# Patient Record
Sex: MALE | Race: WHITE | URBAN - METROPOLITAN AREA
[De-identification: names, ages, dates, MRNs, and addresses within clinical notes are randomized per-mention and may not be internally consistent; named-entity substitution may affect disease eponyms.]

---

## 2017-01-04 ENCOUNTER — OUTPATIENT (OUTPATIENT)
Dept: OUTPATIENT SERVICES | Facility: HOSPITAL | Age: 72
LOS: 1 days | End: 2017-01-04
Payer: MEDICARE

## 2017-01-04 DIAGNOSIS — R55 SYNCOPE AND COLLAPSE: ICD-10-CM

## 2017-01-04 PROCEDURE — 70544 MR ANGIOGRAPHY HEAD W/O DYE: CPT | Mod: 26,59

## 2017-01-04 PROCEDURE — 70551 MRI BRAIN STEM W/O DYE: CPT | Mod: 26

## 2017-01-04 PROCEDURE — 70551 MRI BRAIN STEM W/O DYE: CPT

## 2017-01-04 PROCEDURE — 70544 MR ANGIOGRAPHY HEAD W/O DYE: CPT

## 2017-03-22 ENCOUNTER — EMERGENCY (EMERGENCY)
Facility: HOSPITAL | Age: 72
LOS: 1 days | Discharge: HOME | End: 2017-03-22
Attending: EMERGENCY MEDICINE | Admitting: EMERGENCY MEDICINE
Payer: MEDICARE

## 2017-03-22 VITALS
HEART RATE: 77 BPM | TEMPERATURE: 98 F | RESPIRATION RATE: 18 BRPM | DIASTOLIC BLOOD PRESSURE: 65 MMHG | OXYGEN SATURATION: 100 % | SYSTOLIC BLOOD PRESSURE: 176 MMHG

## 2017-03-22 VITALS — DIASTOLIC BLOOD PRESSURE: 78 MMHG | HEART RATE: 78 BPM | SYSTOLIC BLOOD PRESSURE: 140 MMHG
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
## 2017-03-22 DIAGNOSIS — Z87.891 PERSONAL HISTORY OF NICOTINE DEPENDENCE: ICD-10-CM

## 2017-03-22 DIAGNOSIS — I25.10 ATHEROSCLEROTIC HEART DISEASE OF NATIVE CORONARY ARTERY WITHOUT ANGINA PECTORIS: ICD-10-CM
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
## 2017-03-22 DIAGNOSIS — Z98.890 OTHER SPECIFIED POSTPROCEDURAL STATES: Chronic | ICD-10-CM

## 2017-03-22 DIAGNOSIS — R53.1 WEAKNESS: ICD-10-CM

## 2017-03-22 DIAGNOSIS — I10 ESSENTIAL (PRIMARY) HYPERTENSION: ICD-10-CM

## 2017-03-22 DIAGNOSIS — I63.9 CEREBRAL INFARCTION, UNSPECIFIED: ICD-10-CM

## 2017-03-22 DIAGNOSIS — E78.5 HYPERLIPIDEMIA, UNSPECIFIED: ICD-10-CM

## 2017-03-22 DIAGNOSIS — Z90.49 ACQUIRED ABSENCE OF OTHER SPECIFIED PARTS OF DIGESTIVE TRACT: ICD-10-CM

## 2017-03-22 DIAGNOSIS — Z90.49 ACQUIRED ABSENCE OF OTHER SPECIFIED PARTS OF DIGESTIVE TRACT: Chronic | ICD-10-CM
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
## 2017-03-22 PROCEDURE — 70450 CT HEAD/BRAIN W/O DYE: CPT | Mod: 26

## 2017-03-22 PROCEDURE — 99284 EMERGENCY DEPT VISIT MOD MDM: CPT | Mod: GC

## 2017-03-22 PROCEDURE — 70450 CT HEAD/BRAIN W/O DYE: CPT

## 2017-03-22 PROCEDURE — 99284 EMERGENCY DEPT VISIT MOD MDM: CPT | Mod: 25

## 2017-03-22 RX ORDER — ACETAMINOPHEN 500 MG
975 TABLET ORAL ONCE
Qty: 0 | Refills: 0 | Status: COMPLETED | OUTPATIENT
Start: 2017-03-22 | End: 2017-03-22

## 2017-03-22 RX ADMIN — Medication 975 MILLIGRAM(S): at 17:00

## 2017-03-22 NOTE — ED PROVIDER NOTE - OBJECTIVE STATEMENT
68 year old in assisted living since cva with left sided weakness, with syncope this morning and head strike against wall (laura left on the wall as per EMS). event occurred at 6am, patient was escorted promptly to bed and then staff were concerned on routine PT eval this afternoon. On aspirin and plavix for CAD. patient states he twisted his ankle and fell (mechanical) and patient fell into the wall.     PMD: Lina Russell and Dr. Krueger 68 year old in assisted living since cva in November 2016 with left sided weakness, with syncope this morning and head strike against wall (laura left on the wall as per EMS). event occurred at 6am, patient was escorted promptly to bed and then staff were concerned on routine PT eval this afternoon. On aspirin and plavix for CAD. patient states he twisted his ankle and fell (mechanical) and patient fell into the wall. impact to left side of head. no focal complaints or pain currently    PMD: Lina Russell and Dr. Krueger 68 year old in assisted living since cva and seizure, on keppra in November 2016 with left sided weakness, with syncope this morning and head strike against wall (laura left on the wall as per EMS). event occurred at 6am, patient was escorted promptly to bed and then staff were concerned on routine PT eval this afternoon. On aspirin and plavix for CAD. patient states he twisted his ankle and fell (mechanical) and patient fell into the wall. impact to left side of head. no focal complaints or pain currently    PMD: Lina Russell and Dr. Krueger 68 year old in assisted living since cva and seizure, on keppra in November 2016 with left sided weakness, with fall this morning and head strike against wall (laura left on the wall as per EMS). event occurred at 6am, patient was escorted promptly to bed and then staff were concerned on routine PT eval this afternoon. On aspirin and plavix for CAD. patient states he twisted his ankle and fell (mechanical) and patient fell into the wall. impact to left side of head. no focal complaints or pain currently. aaox4 with reported downtime of 5 minutes.     PMD: Lina Russell and Dr. Krueger

## 2017-03-22 NOTE — ED PROVIDER NOTE - PHYSICAL EXAMINATION
Len: A & O x 3, NAD, HEENT with no pain on palpation of head or bruising and with mild lower left facial weakness (chronic); lungs CTAB, heart with reg rhythm without murmur; abdomen soft NTND; extremities with no edema; skin with no rashes, neuro exam with left motor weakness on left hand (3/5 ), arm and hip flexion (4/5) with no sensory deficits. no chest wall pain , back pain, arm or leg pain on palpation.

## 2017-03-22 NOTE — ED PROVIDER NOTE - ATTENDING CONTRIBUTION TO CARE
70 yo male with PMH of HTN, HL, CVA with left sided hemiparesis, CAD with stents, on plavix and asa presents s/p mechanical fall at 6 am with + HS to wall, no LOC. Patient denies any ha, n/v/visual changes, parasthesias, new weakness, cp, sob, abdominal pain, pleuritic pain. Sent here by SNF for eval. On exam, AVSS, EOMI, PERRLA, no trauma to head,5/5 strength to RUE, no pain to pelvis, stable, no pain to chest wall, cta bl, no c,t, or ls spine ttp. soft, nt nd abdomen, no r/g/r.

## 2017-03-22 NOTE — ED PROVIDER NOTE - CARE PLAN
Principal Discharge DX:	Fall  Instructions for follow-up, activity and diet:	Follow up with your primary care doctor within 48-72 hours.   You must return for new, worsening or concerning symptoms; specifically including those listed on the attached sheet.   You may take 1000mg of tylenol every 6 hours for baseline pain control with respect to the warnings on the label

## 2017-03-22 NOTE — ED PROVIDER NOTE - PLAN OF CARE
Follow up with your primary care doctor within 48-72 hours.   You must return for new, worsening or concerning symptoms; specifically including those listed on the attached sheet.   You may take 1000mg of tylenol every 6 hours for baseline pain control with respect to the warnings on the label

## 2017-03-22 NOTE — ED PROVIDER NOTE - PMH
CAD (coronary artery disease)    CVA (cerebral vascular accident)    HLD (hyperlipidemia)    HTN (hypertension) CAD (coronary artery disease)    CVA (cerebral vascular accident)    HLD (hyperlipidemia)    HTN (hypertension)    Seizure syndrome

## 2017-03-22 NOTE — ED ADULT NURSE NOTE - OBJECTIVE STATEMENT
pt was getting out of bed to use restroom this morning at 630am when he lost his balance and fell into a wall, striking head. pt then began having headache, left sided, ems was called. pt arrives aao x 3, no resp distress, skin warm, dry. no obvious injuries or deformities, pt reports no loc. no dizziness, no recent illness, hx CVA in nov 2016, left sided weakness, unable to move left arm independently, left leg weaker than right.

## 2017-05-10 ENCOUNTER — APPOINTMENT (OUTPATIENT)
Dept: VASCULAR SURGERY | Facility: CLINIC | Age: 72
End: 2017-05-10

## 2017-05-11 ENCOUNTER — EMERGENCY (EMERGENCY)
Facility: HOSPITAL | Age: 72
LOS: 1 days | Discharge: HOME | End: 2017-05-11
Attending: EMERGENCY MEDICINE | Admitting: EMERGENCY MEDICINE
Payer: MEDICARE

## 2017-05-11 VITALS
RESPIRATION RATE: 20 BRPM | HEART RATE: 74 BPM | TEMPERATURE: 98 F | SYSTOLIC BLOOD PRESSURE: 143 MMHG | DIASTOLIC BLOOD PRESSURE: 89 MMHG | OXYGEN SATURATION: 99 %

## 2017-05-11 VITALS
OXYGEN SATURATION: 94 % | TEMPERATURE: 98 F | DIASTOLIC BLOOD PRESSURE: 85 MMHG | HEART RATE: 89 BPM | SYSTOLIC BLOOD PRESSURE: 166 MMHG | RESPIRATION RATE: 20 BRPM

## 2017-05-11 DIAGNOSIS — Z86.73 PERSONAL HISTORY OF TRANSIENT ISCHEMIC ATTACK (TIA), AND CEREBRAL INFARCTION WITHOUT RESIDUAL DEFICITS: ICD-10-CM
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
## 2017-05-11 DIAGNOSIS — Z98.890 OTHER SPECIFIED POSTPROCEDURAL STATES: ICD-10-CM
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
## 2017-05-11 DIAGNOSIS — S50.02XA CONTUSION OF LEFT ELBOW, INITIAL ENCOUNTER: ICD-10-CM

## 2017-05-11 DIAGNOSIS — Z90.49 ACQUIRED ABSENCE OF OTHER SPECIFIED PARTS OF DIGESTIVE TRACT: ICD-10-CM

## 2017-05-11 DIAGNOSIS — E78.5 HYPERLIPIDEMIA, UNSPECIFIED: ICD-10-CM

## 2017-05-11 DIAGNOSIS — I10 ESSENTIAL (PRIMARY) HYPERTENSION: ICD-10-CM
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
## 2017-05-11 DIAGNOSIS — Z98.890 OTHER SPECIFIED POSTPROCEDURAL STATES: Chronic | ICD-10-CM

## 2017-05-11 DIAGNOSIS — I25.10 ATHEROSCLEROTIC HEART DISEASE OF NATIVE CORONARY ARTERY WITHOUT ANGINA PECTORIS: ICD-10-CM

## 2017-05-11 DIAGNOSIS — Z90.49 ACQUIRED ABSENCE OF OTHER SPECIFIED PARTS OF DIGESTIVE TRACT: Chronic | ICD-10-CM

## 2017-05-11 DIAGNOSIS — S50.12XA CONTUSION OF LEFT FOREARM, INITIAL ENCOUNTER: ICD-10-CM
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
## 2017-05-11 PROCEDURE — 29105 APPLICATION LONG ARM SPLINT: CPT | Mod: GC

## 2017-05-11 PROCEDURE — 73110 X-RAY EXAM OF WRIST: CPT | Mod: 26,LT

## 2017-05-11 PROCEDURE — 73060 X-RAY EXAM OF HUMERUS: CPT | Mod: 26,LT

## 2017-05-11 PROCEDURE — 93010 ELECTROCARDIOGRAM REPORT: CPT | Mod: 59

## 2017-05-11 PROCEDURE — 73090 X-RAY EXAM OF FOREARM: CPT | Mod: 26,LT

## 2017-05-11 PROCEDURE — 70450 CT HEAD/BRAIN W/O DYE: CPT

## 2017-05-11 PROCEDURE — 96375 TX/PRO/DX INJ NEW DRUG ADDON: CPT | Mod: XU

## 2017-05-11 PROCEDURE — 70450 CT HEAD/BRAIN W/O DYE: CPT | Mod: 26

## 2017-05-11 PROCEDURE — 73080 X-RAY EXAM OF ELBOW: CPT

## 2017-05-11 PROCEDURE — 73030 X-RAY EXAM OF SHOULDER: CPT

## 2017-05-11 PROCEDURE — 99284 EMERGENCY DEPT VISIT MOD MDM: CPT | Mod: 25,GC

## 2017-05-11 PROCEDURE — 93005 ELECTROCARDIOGRAM TRACING: CPT | Mod: XU

## 2017-05-11 PROCEDURE — 73080 X-RAY EXAM OF ELBOW: CPT | Mod: 26,LT

## 2017-05-11 PROCEDURE — 73110 X-RAY EXAM OF WRIST: CPT

## 2017-05-11 PROCEDURE — 73030 X-RAY EXAM OF SHOULDER: CPT | Mod: 26,LT

## 2017-05-11 PROCEDURE — 29105 APPLICATION LONG ARM SPLINT: CPT | Mod: LT

## 2017-05-11 PROCEDURE — 73060 X-RAY EXAM OF HUMERUS: CPT

## 2017-05-11 PROCEDURE — 73090 X-RAY EXAM OF FOREARM: CPT

## 2017-05-11 PROCEDURE — 99284 EMERGENCY DEPT VISIT MOD MDM: CPT | Mod: 25

## 2017-05-11 PROCEDURE — 96374 THER/PROPH/DIAG INJ IV PUSH: CPT | Mod: XU

## 2017-05-11 RX ORDER — OXYCODONE HYDROCHLORIDE 5 MG/1
1 TABLET ORAL
Qty: 6 | Refills: 0 | OUTPATIENT
Start: 2017-05-11 | End: 2017-05-14

## 2017-05-11 RX ORDER — ACETAMINOPHEN 500 MG
1000 TABLET ORAL ONCE
Qty: 0 | Refills: 0 | Status: COMPLETED | OUTPATIENT
Start: 2017-05-11 | End: 2017-05-11

## 2017-05-11 RX ORDER — MORPHINE SULFATE 50 MG/1
4 CAPSULE, EXTENDED RELEASE ORAL ONCE
Qty: 0 | Refills: 0 | Status: DISCONTINUED | OUTPATIENT
Start: 2017-05-11 | End: 2017-05-11

## 2017-05-11 RX ADMIN — Medication 400 MILLIGRAM(S): at 13:20

## 2017-05-11 RX ADMIN — Medication 1000 MILLIGRAM(S): at 14:52

## 2017-05-11 RX ADMIN — MORPHINE SULFATE 4 MILLIGRAM(S): 50 CAPSULE, EXTENDED RELEASE ORAL at 15:00

## 2017-05-11 NOTE — ED PROVIDER NOTE - PLAN OF CARE
1) You were here for arm pain.    2) Take oxycodone 2 times daily as needed for severe pain.     3) Follow up with the orthopedist at [phone] for further evaluation and to answer any questions you have.    4) Return to the emergency department if you experience worsening symptoms, pain, fever, chills, nausea, vomiting or other concerning symptoms.

## 2017-05-11 NOTE — ED PROVIDER NOTE - PROGRESS NOTE DETAILS
pain limiting X-rays, will give pain control, reattemtp imaging. Signed out to nicholas. Attending MD Pierce: XR elbow prelim read without obvious fracture, significant swelling and pain to elbow, will immobilize elbow and refer to orthopedics to rule out occult elbow fx

## 2017-05-11 NOTE — ED PROCEDURE NOTE - CPROC ED POST PROC CARE GUIDE1
Instructed patient/caregiver regarding signs and symptoms of infection./Verbal/written post procedure instructions were given to patient/caregiver./Keep the cast/splint/dressing clean and dry./Elevate the injured extremity as instructed./Instructed patient/caregiver to follow-up with primary care physician.

## 2017-05-11 NOTE — ED PROVIDER NOTE - OBJECTIVE STATEMENT
AOx4 78M with pmh CVA (11/2017) with residual L sided weakness, htn, hl, BL lower extremity DVTs p/w pain in L arm s/pw fall yesterday.  Yesterday, had trip and fall, striking L head on elevator door, on ground for 5-10 minutes.  Had xrays at [facility] with negative xrays.  C/o pain in L arm, worst around elbow with swelling, ecchymosis, pain with ROM of L elbow, shoulder, wrist.  Has decreased LUE sensation but says is chronic.  Denies fever, chills, cp, sob, recent illness, LOC, n/v, diaphoresis.    primary medical doctor:

## 2017-05-11 NOTE — ED ADULT NURSE NOTE - OBJECTIVE STATEMENT
Patient  is  alert  and oriented x3.  Color  is  good and  skin warm  to  touch.  He  tripped  and  fell  at  the assisted  living.  He is c/o  lt  upper  extremity pain.  Swelling  and  ecchymosis noted to  lt  arm.  Area  is  kept  elevated.

## 2017-05-11 NOTE — ED PROVIDER NOTE - MUSCULOSKELETAL, MLM
Spine appears normal, range of motion is not limited, tenderness to palpation, decreased range of motion of L elbow/forearm.  Ecchymosis and swelling of L elbow and forearm.

## 2017-05-11 NOTE — ED PROVIDER NOTE - ATTENDING CONTRIBUTION TO CARE
ATTENDING MD:  Arie METZ, personally have seen and examined this patient.  I have discussed all aspects of care with the resident physician. Resident note reviewed and agree on plan of care and except where noted.  See HPI, PE, and MDM for details.     EXAM: NCAT, FRANCESCA, EOMI, neck nontender and painless ROM. Lungs CTAB, no chest wall tenderness. Heart RRR. LUE with TTP raciel-elbow with large elbow effusion and pain on supination and TTp over radial head. unable to fully flex or extend. mild pain ranging Lshoulder btu full AROM. Neurovascularly intact distal to elbow. 2+ raadila pulses able to thumbs, up, ok sign and spread fingers. sensation intact to light touch throughout LUE. Otherwise nonfocal neuro exam no clear other injury.    MDM: fall anticoagulated worsenign arm pain. suspect occult arm fx. will CT head, image arm. probale splitn with orhto f/u.

## 2017-05-11 NOTE — ED PROVIDER NOTE - CARE PLAN
Principal Discharge DX:	Pain of left upper extremity  Instructions for follow-up, activity and diet:	1) You were here for arm pain.    2) Take oxycodone 2 times daily as needed for severe pain.     3) Follow up with the orthopedist at [phone] for further evaluation and to answer any questions you have.    4) Return to the emergency department if you experience worsening symptoms, pain, fever, chills, nausea, vomiting or other concerning symptoms. Principal Discharge DX:	Pain of left upper extremity  Instructions for follow-up, activity and diet:	1) You were here for arm pain.    2) Take oxycodone 2 times daily as needed for severe pain.     3) Follow up with the orthopedist at [phone] for further evaluation and to answer any questions you have.    4) Return to the emergency department if you experience worsening symptoms, pain, fever, chills, nausea, vomiting or other concerning symptoms. Principal Discharge DX:	Pain of left upper extremity  Instructions for follow-up, activity and diet:	1) You were here for arm pain.    2) Take oxycodone 2 times daily as needed for severe pain.     3) Follow up with the orthopedist at [phone] for further evaluation and to answer any questions you have.    4) Return to the emergency department if you experience worsening symptoms, pain, fever, chills, nausea, vomiting or other concerning symptoms.

## 2017-05-11 NOTE — ED ADULT NURSE NOTE - PMH
CAD (coronary artery disease)    CVA (cerebral vascular accident)    HLD (hyperlipidemia)    HTN (hypertension)    Seizure syndrome

## 2018-01-22 ENCOUNTER — INPATIENT (INPATIENT)
Facility: HOSPITAL | Age: 73
LOS: 7 days | Discharge: HOME | DRG: 394 | End: 2018-01-30
Attending: INTERNAL MEDICINE | Admitting: HOSPITALIST
Payer: MEDICARE

## 2018-01-22 VITALS
OXYGEN SATURATION: 96 % | WEIGHT: 190.04 LBS | HEART RATE: 95 BPM | HEIGHT: 69 IN | TEMPERATURE: 97 F | RESPIRATION RATE: 15 BRPM | SYSTOLIC BLOOD PRESSURE: 132 MMHG | DIASTOLIC BLOOD PRESSURE: 76 MMHG

## 2018-01-22 DIAGNOSIS — J18.9 PNEUMONIA, UNSPECIFIED ORGANISM: ICD-10-CM

## 2018-01-22 DIAGNOSIS — D72.829 ELEVATED WHITE BLOOD CELL COUNT, UNSPECIFIED: ICD-10-CM

## 2018-01-22 DIAGNOSIS — K62.5 HEMORRHAGE OF ANUS AND RECTUM: ICD-10-CM

## 2018-01-22 DIAGNOSIS — G40.909 EPILEPSY, UNSPECIFIED, NOT INTRACTABLE, WITHOUT STATUS EPILEPTICUS: ICD-10-CM

## 2018-01-22 DIAGNOSIS — E78.5 HYPERLIPIDEMIA, UNSPECIFIED: ICD-10-CM

## 2018-01-22 DIAGNOSIS — I25.10 ATHEROSCLEROTIC HEART DISEASE OF NATIVE CORONARY ARTERY WITHOUT ANGINA PECTORIS: ICD-10-CM

## 2018-01-22 DIAGNOSIS — Z98.890 OTHER SPECIFIED POSTPROCEDURAL STATES: Chronic | ICD-10-CM

## 2018-01-22 DIAGNOSIS — I10 ESSENTIAL (PRIMARY) HYPERTENSION: ICD-10-CM

## 2018-01-22 DIAGNOSIS — Z29.9 ENCOUNTER FOR PROPHYLACTIC MEASURES, UNSPECIFIED: ICD-10-CM

## 2018-01-22 DIAGNOSIS — I63.9 CEREBRAL INFARCTION, UNSPECIFIED: ICD-10-CM

## 2018-01-22 DIAGNOSIS — R79.89 OTHER SPECIFIED ABNORMAL FINDINGS OF BLOOD CHEMISTRY: ICD-10-CM

## 2018-01-22 DIAGNOSIS — C95.90 LEUKEMIA, UNSPECIFIED NOT HAVING ACHIEVED REMISSION: ICD-10-CM

## 2018-01-22 DIAGNOSIS — C92.10 CHRONIC MYELOID LEUKEMIA, BCR/ABL-POSITIVE, NOT HAVING ACHIEVED REMISSION: ICD-10-CM

## 2018-01-22 DIAGNOSIS — Z95.5 PRESENCE OF CORONARY ANGIOPLASTY IMPLANT AND GRAFT: Chronic | ICD-10-CM

## 2018-01-22 DIAGNOSIS — Z90.49 ACQUIRED ABSENCE OF OTHER SPECIFIED PARTS OF DIGESTIVE TRACT: Chronic | ICD-10-CM

## 2018-01-22 DIAGNOSIS — K92.2 GASTROINTESTINAL HEMORRHAGE, UNSPECIFIED: ICD-10-CM

## 2018-01-22 LAB
ACANTHOCYTES BLD QL SMEAR: SLIGHT — SIGNIFICANT CHANGE UP
ALBUMIN SERPL ELPH-MCNC: 3.7 G/DL — SIGNIFICANT CHANGE UP (ref 3.3–5)
ALP SERPL-CCNC: 100 U/L — SIGNIFICANT CHANGE UP (ref 40–120)
ALT FLD-CCNC: 27 U/L — SIGNIFICANT CHANGE UP (ref 12–78)
ANION GAP SERPL CALC-SCNC: 7 MMOL/L — SIGNIFICANT CHANGE UP (ref 5–17)
ANISOCYTOSIS BLD QL: SLIGHT — SIGNIFICANT CHANGE UP
APPEARANCE UR: CLEAR — SIGNIFICANT CHANGE UP
APTT BLD: 29.7 SEC — SIGNIFICANT CHANGE UP (ref 27.5–37.4)
AST SERPL-CCNC: 25 U/L — SIGNIFICANT CHANGE UP (ref 15–37)
BACTERIA # UR AUTO: NEGATIVE — SIGNIFICANT CHANGE UP
BASOPHILS NFR BLD AUTO: 1 % — SIGNIFICANT CHANGE UP (ref 0–2)
BILIRUB SERPL-MCNC: 0.5 MG/DL — SIGNIFICANT CHANGE UP (ref 0.2–1.2)
BILIRUB UR-MCNC: NEGATIVE — SIGNIFICANT CHANGE UP
BLD GP AB SCN SERPL QL: SIGNIFICANT CHANGE UP
BUN SERPL-MCNC: 17 MG/DL — SIGNIFICANT CHANGE UP (ref 7–23)
CALCIUM SERPL-MCNC: 9.1 MG/DL — SIGNIFICANT CHANGE UP (ref 8.5–10.1)
CHLORIDE SERPL-SCNC: 105 MMOL/L — SIGNIFICANT CHANGE UP (ref 96–108)
CO2 SERPL-SCNC: 28 MMOL/L — SIGNIFICANT CHANGE UP (ref 22–31)
COLOR SPEC: YELLOW — SIGNIFICANT CHANGE UP
CREAT SERPL-MCNC: 0.79 MG/DL — SIGNIFICANT CHANGE UP (ref 0.5–1.3)
DACRYOCYTES BLD QL SMEAR: SLIGHT — SIGNIFICANT CHANGE UP
DIFF PNL FLD: ABNORMAL
ELLIPTOCYTES BLD QL SMEAR: SLIGHT — SIGNIFICANT CHANGE UP
EPI CELLS # UR: NEGATIVE — SIGNIFICANT CHANGE UP
GLUCOSE SERPL-MCNC: 82 MG/DL — SIGNIFICANT CHANGE UP (ref 70–99)
GLUCOSE UR QL: NEGATIVE — SIGNIFICANT CHANGE UP
HCT VFR BLD CALC: 45.7 % — SIGNIFICANT CHANGE UP (ref 39–50)
HGB BLD-MCNC: 14.9 G/DL — SIGNIFICANT CHANGE UP (ref 13–17)
HYALINE CASTS # UR AUTO: ABNORMAL /LPF
INR BLD: 1.15 RATIO — SIGNIFICANT CHANGE UP (ref 0.88–1.16)
KETONES UR-MCNC: NEGATIVE — SIGNIFICANT CHANGE UP
LACTATE SERPL-SCNC: 1 MMOL/L — SIGNIFICANT CHANGE UP (ref 0.7–2)
LACTATE SERPL-SCNC: 2.3 MMOL/L — HIGH (ref 0.7–2)
LEUKOCYTE ESTERASE UR-ACNC: NEGATIVE — SIGNIFICANT CHANGE UP
LG PLATELETS BLD QL AUTO: SLIGHT — SIGNIFICANT CHANGE UP
LYMPHOCYTES # BLD AUTO: 86 % — HIGH (ref 13–44)
MACROCYTES BLD QL: SLIGHT — SIGNIFICANT CHANGE UP
MCHC RBC-ENTMCNC: 29.1 PG — SIGNIFICANT CHANGE UP (ref 27–34)
MCHC RBC-ENTMCNC: 32.6 GM/DL — SIGNIFICANT CHANGE UP (ref 32–36)
MCV RBC AUTO: 89.1 FL — SIGNIFICANT CHANGE UP (ref 80–100)
MICROCYTES BLD QL: SLIGHT — SIGNIFICANT CHANGE UP
MONOCYTES NFR BLD AUTO: 2 % — SIGNIFICANT CHANGE UP (ref 1–9)
NEUTROPHILS NFR BLD AUTO: 11 % — LOW (ref 43–77)
NITRITE UR-MCNC: NEGATIVE — SIGNIFICANT CHANGE UP
OB PNL STL: POSITIVE
OVALOCYTES BLD QL SMEAR: SLIGHT — SIGNIFICANT CHANGE UP
PH UR: 6 — SIGNIFICANT CHANGE UP (ref 5–8)
PLAT MORPH BLD: NORMAL — SIGNIFICANT CHANGE UP
PLATELET # BLD AUTO: 140 K/UL — LOW (ref 150–400)
POIKILOCYTOSIS BLD QL AUTO: SLIGHT — SIGNIFICANT CHANGE UP
POTASSIUM SERPL-MCNC: 4.3 MMOL/L — SIGNIFICANT CHANGE UP (ref 3.5–5.3)
POTASSIUM SERPL-SCNC: 4.3 MMOL/L — SIGNIFICANT CHANGE UP (ref 3.5–5.3)
PROT SERPL-MCNC: 7 G/DL — SIGNIFICANT CHANGE UP (ref 6–8.3)
PROT UR-MCNC: NEGATIVE — SIGNIFICANT CHANGE UP
PROTHROM AB SERPL-ACNC: 12.6 SEC — SIGNIFICANT CHANGE UP (ref 9.8–12.7)
RBC # BLD: 5.13 M/UL — SIGNIFICANT CHANGE UP (ref 4.2–5.8)
RBC # FLD: 12.8 % — SIGNIFICANT CHANGE UP (ref 10.3–14.5)
RBC BLD AUTO: ABNORMAL
RBC CASTS # UR COMP ASSIST: SIGNIFICANT CHANGE UP /HPF (ref 0–4)
SODIUM SERPL-SCNC: 140 MMOL/L — SIGNIFICANT CHANGE UP (ref 135–145)
SP GR SPEC: 1.01 — SIGNIFICANT CHANGE UP (ref 1.01–1.02)
UROBILINOGEN FLD QL: NEGATIVE — SIGNIFICANT CHANGE UP
WBC # BLD: 24.4 K/UL — HIGH (ref 3.8–10.5)
WBC # FLD AUTO: 24.4 K/UL — HIGH (ref 3.8–10.5)
WBC UR QL: NEGATIVE — SIGNIFICANT CHANGE UP

## 2018-01-22 PROCEDURE — 99285 EMERGENCY DEPT VISIT HI MDM: CPT

## 2018-01-22 PROCEDURE — 93010 ELECTROCARDIOGRAM REPORT: CPT

## 2018-01-22 PROCEDURE — 99223 1ST HOSP IP/OBS HIGH 75: CPT | Mod: AI,GC

## 2018-01-22 PROCEDURE — 74177 CT ABD & PELVIS W/CONTRAST: CPT | Mod: 26

## 2018-01-22 RX ORDER — ATORVASTATIN CALCIUM 80 MG/1
40 TABLET, FILM COATED ORAL AT BEDTIME
Qty: 0 | Refills: 0 | Status: DISCONTINUED | OUTPATIENT
Start: 2018-01-22 | End: 2018-01-30

## 2018-01-22 RX ORDER — METOPROLOL TARTRATE 50 MG
25 TABLET ORAL
Qty: 0 | Refills: 0 | Status: DISCONTINUED | OUTPATIENT
Start: 2018-01-22 | End: 2018-01-27

## 2018-01-22 RX ORDER — VANCOMYCIN HCL 1 G
VIAL (EA) INTRAVENOUS
Qty: 0 | Refills: 0 | Status: DISCONTINUED | OUTPATIENT
Start: 2018-01-22 | End: 2018-01-24

## 2018-01-22 RX ORDER — IOHEXOL 300 MG/ML
30 INJECTION, SOLUTION INTRAVENOUS ONCE
Qty: 0 | Refills: 0 | Status: COMPLETED | OUTPATIENT
Start: 2018-01-22 | End: 2018-01-22

## 2018-01-22 RX ORDER — VANCOMYCIN HCL 1 G
1000 VIAL (EA) INTRAVENOUS ONCE
Qty: 0 | Refills: 0 | Status: COMPLETED | OUTPATIENT
Start: 2018-01-22 | End: 2018-01-22

## 2018-01-22 RX ORDER — SODIUM CHLORIDE 9 MG/ML
1000 INJECTION INTRAMUSCULAR; INTRAVENOUS; SUBCUTANEOUS
Qty: 0 | Refills: 0 | Status: DISCONTINUED | OUTPATIENT
Start: 2018-01-22 | End: 2018-01-25

## 2018-01-22 RX ORDER — PANTOPRAZOLE SODIUM 20 MG/1
40 TABLET, DELAYED RELEASE ORAL DAILY
Qty: 0 | Refills: 0 | Status: DISCONTINUED | OUTPATIENT
Start: 2018-01-22 | End: 2018-01-30

## 2018-01-22 RX ORDER — PIPERACILLIN AND TAZOBACTAM 4; .5 G/20ML; G/20ML
3.38 INJECTION, POWDER, LYOPHILIZED, FOR SOLUTION INTRAVENOUS ONCE
Qty: 0 | Refills: 0 | Status: COMPLETED | OUTPATIENT
Start: 2018-01-22 | End: 2018-01-22

## 2018-01-22 RX ORDER — CHOLECALCIFEROL (VITAMIN D3) 125 MCG
1000 CAPSULE ORAL DAILY
Qty: 0 | Refills: 0 | Status: DISCONTINUED | OUTPATIENT
Start: 2018-01-22 | End: 2018-01-30

## 2018-01-22 RX ORDER — GABAPENTIN 400 MG/1
100 CAPSULE ORAL
Qty: 0 | Refills: 0 | Status: DISCONTINUED | OUTPATIENT
Start: 2018-01-22 | End: 2018-01-30

## 2018-01-22 RX ORDER — MEN-PHOR .5; .5 G/G; G/G
1 LOTION TOPICAL THREE TIMES A DAY
Qty: 0 | Refills: 0 | Status: DISCONTINUED | OUTPATIENT
Start: 2018-01-22 | End: 2018-01-22

## 2018-01-22 RX ORDER — PIPERACILLIN AND TAZOBACTAM 4; .5 G/20ML; G/20ML
3.38 INJECTION, POWDER, LYOPHILIZED, FOR SOLUTION INTRAVENOUS EVERY 8 HOURS
Qty: 0 | Refills: 0 | Status: DISCONTINUED | OUTPATIENT
Start: 2018-01-22 | End: 2018-01-24

## 2018-01-22 RX ORDER — VANCOMYCIN HCL 1 G
1000 VIAL (EA) INTRAVENOUS EVERY 12 HOURS
Qty: 0 | Refills: 0 | Status: DISCONTINUED | OUTPATIENT
Start: 2018-01-23 | End: 2018-01-24

## 2018-01-22 RX ORDER — VANCOMYCIN HCL 1 G
1000 VIAL (EA) INTRAVENOUS ONCE
Qty: 0 | Refills: 0 | Status: DISCONTINUED | OUTPATIENT
Start: 2018-01-22 | End: 2018-01-22

## 2018-01-22 RX ORDER — PREGABALIN 225 MG/1
1000 CAPSULE ORAL DAILY
Qty: 0 | Refills: 0 | Status: DISCONTINUED | OUTPATIENT
Start: 2018-01-22 | End: 2018-01-30

## 2018-01-22 RX ORDER — LEVETIRACETAM 250 MG/1
500 TABLET, FILM COATED ORAL THREE TIMES A DAY
Qty: 0 | Refills: 0 | Status: DISCONTINUED | OUTPATIENT
Start: 2018-01-22 | End: 2018-01-30

## 2018-01-22 RX ADMIN — IOHEXOL 30 MILLILITER(S): 300 INJECTION, SOLUTION INTRAVENOUS at 16:44

## 2018-01-22 RX ADMIN — PIPERACILLIN AND TAZOBACTAM 200 GRAM(S): 4; .5 INJECTION, POWDER, LYOPHILIZED, FOR SOLUTION INTRAVENOUS at 19:11

## 2018-01-22 RX ADMIN — Medication 250 MILLIGRAM(S): at 23:15

## 2018-01-22 RX ADMIN — LEVETIRACETAM 500 MILLIGRAM(S): 250 TABLET, FILM COATED ORAL at 23:28

## 2018-01-22 RX ADMIN — ATORVASTATIN CALCIUM 40 MILLIGRAM(S): 80 TABLET, FILM COATED ORAL at 23:28

## 2018-01-22 NOTE — ED ADULT NURSE NOTE - CHPI ED SYMPTOMS NEG
no tingling/no chills/no vomiting/no decreased eating/drinking/no pain/no weakness/no dizziness/no fever

## 2018-01-22 NOTE — H&P ADULT - NSHPPHYSICALEXAM_GEN_ALL_CORE
T(C): 36.2 (01-22-18 @ 12:36), Max: 36.2 (01-22-18 @ 12:36)  HR: 95 (01-22-18 @ 12:36) (95 - 95)  BP: 132/76 (01-22-18 @ 12:36) (132/76 - 132/76)  RR: 15 (01-22-18 @ 12:36) (15 - 15)  SpO2: 96% (01-22-18 @ 12:36) (96% - 96%)      General: Well developed, well nourished, NAD  HEENT: NCAT, PERRLA, EOMI bl, moist mucous membranes   Neck: Supple, nontender  Neurology: A&Ox3, decreased sensation left upper and lower extremities (residual from CVA)  Respiratory: CTA B/L, No W/R/R  CV: RRR, +S1/S2  Abdominal: Soft, NT, ND +BSx4  Extremities: no peripheral edema, + peripheral pulses  MSK: left upper and lower extremity muscle strength 2/5 (residual from CVA)  Skin: warm, dry, normal color T(C): 36.2 (01-22-18 @ 12:36), Max: 36.2 (01-22-18 @ 12:36)  HR: 95 (01-22-18 @ 12:36) (95 - 95)  BP: 132/76 (01-22-18 @ 12:36) (132/76 - 132/76)  RR: 15 (01-22-18 @ 12:36) (15 - 15)  SpO2: 96% (01-22-18 @ 12:36) (96% - 96%)      General: Well developed, well nourished, NAD  HEENT: NCAT, PERRLA, EOMI bl, moist mucous membranes   Neck: Supple, nontender  Neurology: A&Ox3, decreased sensation left upper and lower extremities (residual from CVA)  Respiratory: Rhonchi at LLB  CV: RRR, +S1/S2  Abdominal: Soft, NT, ND +BSx4. rectal with good tone, no hemorrhoids or stool for FOBT  Extremities: no peripheral edema, + peripheral pulses  MSK: left upper and lower extremity muscle strength 2/5 (residual from CVA)  Skin: warm, dry, normal color

## 2018-01-22 NOTE — H&P ADULT - HISTORY OF PRESENT ILLNESS
71yo M with PMH of CVA (2010) with subsequent left sided hemiplegia and balance difficulties, CAD s/p stents x2 (2010), leukemia (diagnosed in 2008 but has never been treated per patient), seizure disorder (has had 1 seizure in the past) presents with one episode of dark red blood per rectum. Patient states that he went to urinate earlier today and noted dark red blood come out from his "backside." He did not have a bowel movement, it was only blood. He has never experienced anything like this in the past. Up until today he has been in his usual state of health. Denies any fevers, chills, HA, dizziness, CP, palp, SOB, cough, abd pain, N/V/D, dysuria, hematuria.    In ED, VSS, WBC 24.4 (patient states he has a chronically elevated WBC ranging from 20-25 secondary to leukemia), H/H 14.9/45.7, lactate 2.3, FOBT (+), UA (-). CT abdomen/pelvis showed No hydronephrosis or bowel obstruction, airspace disease at the lung bases, possibly atelectatic, evidence of centrilobular emphysema  at the lung bases. Received zosyn x1. 73yo M with PMH of CVA (2010) with subsequent left sided hemiplegia and balance difficulties, CAD s/p stents x2 (2010), CML (diagnosed in 2008 but has never been treated per patient), seizure disorder (has had 1 seizure in the past) presents with one episode of dark red blood per rectum. Patient states that he went to urinate earlier today and noted dark red blood come out from his "backside." He did not have a bowel movement, it was only blood. He has never experienced anything like this in the past. Up until today he has been in his usual state of health. Denies any fevers, chills, HA, dizziness, CP, palp, SOB, cough, abd pain, N/V/D, dysuria, hematuria.    In ED, VSS, WBC 24.4 (patient states he has a chronically elevated WBC ranging from 20-25 secondary to leukemia), H/H 14.9/45.7, lactate 2.3, FOBT (+), UA (-). CT abdomen/pelvis showed No hydronephrosis or bowel obstruction, airspace disease at the lung bases, possibly atelectatic, evidence of centrilobular emphysema  at the lung bases. Received zosyn x1.

## 2018-01-22 NOTE — ED PROVIDER NOTE - CHPI ED SYMPTOMS NEG
no nausea/no vomiting/no dysuria/no fever/no burning urination/no hematuria/no chills/no diarrhea/no abdominal distension

## 2018-01-22 NOTE — H&P ADULT - PROBLEM SELECTOR PLAN 9
no active treatment per outpatient oncologist (as stated by patient) Influenza ppx: patient on tamiflu 30mg QD at assisted living facility, will continue  VTE ppx: SCDs, no pharmacologic VTE ppx in setting of possible GI bleed    IMPROVE VTE Individual Risk Assessment          RISK                                                          Points  [  ] Previous VTE                                                3  [  ] Thrombophilia                                             2  [ 2 ] Lower limb paralysis                                   2        (unable to hold up >15 seconds)    [ 2 ] Current Cancer                                             2         (within 6 months)  [  ] Immobilization > 24 hrs                              1  [  ] ICU/CCU stay > 24 hours                             1  [ 1 ] Age > 60                                                         1    IMPROVE VTE Score: 5 Influenza ppx: patient on tamiflu 30mg QD at assisted living facility, will continue ppx dose pending RVP panel results  VTE ppx: SCDs, no pharmacologic VTE ppx in setting of possible GI bleed    IMPROVE VTE Individual Risk Assessment          RISK                                                          Points  [  ] Previous VTE                                                3  [  ] Thrombophilia                                             2  [ 2 ] Lower limb paralysis                                   2        (unable to hold up >15 seconds)    [ 2 ] Current Cancer                                             2         (within 6 months)  [  ] Immobilization > 24 hrs                              1  [  ] ICU/CCU stay > 24 hours                             1  [ 1 ] Age > 60                                                         1    IMPROVE VTE Score: 5 Influenza ppx: patient on tamiflu 30mg QD at assisted living facility( presumed prohylaxis, awaiting callback), will continue ppx dose pending RVP panel results  VTE ppx: SCDs, no pharmacologic VTE ppx in setting of possible GI bleed    IMPROVE VTE Individual Risk Assessment          RISK                                                          Points  [  ] Previous VTE                                                3  [  ] Thrombophilia                                             2  [ 2 ] Lower limb paralysis                                   2        (unable to hold up >15 seconds)    [ 2 ] Current Cancer                                             2         (within 6 months)  [  ] Immobilization > 24 hrs                              1  [  ] ICU/CCU stay > 24 hours                             1  [ 1 ] Age > 60                                                         1    IMPROVE VTE Score: 5

## 2018-01-22 NOTE — H&P ADULT - PROBLEM SELECTOR PLAN 1
GI bleed, possible upper given presentation with dark red blood per rectum. Patient appears to be hemodynamically stable, H/H wnl, no signs of acute bleed  -protonix 40mg IV  -GI consult (Dr Yung)  -admit to [facility]  -f/u AM labs GI bleed, possible upper given presentation with dark red blood per rectum. Patient appears to be hemodynamically stable, H/H wnl, no signs of acute bleed  -protonix 40mg IV, IVF NS@ 75cc/hr  -GI consult (Dr Yung)  -admit to [facility]  -NPO except meds  -f/u AM labs

## 2018-01-22 NOTE — H&P ADULT - ATTENDING COMMENTS
psersonally interviewed and examined by attending MD with patient son at bedside, both voicing understanding and agreement to aforementioned plan.

## 2018-01-22 NOTE — H&P ADULT - PROBLEM SELECTOR PLAN 8
SCDs, no pharmacologic VTE ppx in setting of possible GI bleed    IMPROVE VTE Individual Risk Assessment          RISK                                                          Points  [  ] Previous VTE                                                3  [  ] Thrombophilia                                             2  [ 2 ] Lower limb paralysis                                   2        (unable to hold up >15 seconds)    [ 2 ] Current Cancer                                             2         (within 6 months)  [  ] Immobilization > 24 hrs                              1  [  ] ICU/CCU stay > 24 hours                             1  [ 1 ] Age > 60                                                         1    IMPROVE VTE Score: 5 continue atorvastatin 40mg daily

## 2018-01-22 NOTE — H&P ADULT - PSH
H/O major orthopedic surgery  left ankle  S/P cholecystectomy    S/P coronary artery stent placement

## 2018-01-22 NOTE — ED PROVIDER NOTE - OBJECTIVE STATEMENT
pt is a 71 yo male who resides at the [facility] assisted living pmd dr bowden  hx of cva rhabdomyolysis htn cad bib ems for eval of bleeding from rectum. pt is a 71 yo male who resides at the [facility] assisted living pmd dr bowden  hx of cva with left hemiplegia rhabdomyolysis htn cad bib ems for eval of bleeding from rectum.  he states he was sitting on the shower chair taking a shower when he stood up there was blood on the chair. so he was sent in to the er for evaluation. pt has no pain or previous hx of same.

## 2018-01-22 NOTE — H&P ADULT - PROBLEM SELECTOR PLAN 2
will hold ASA 81mg and Plavix 75mg at this time in setting of possible GI bleed Per patient this is chronic with baseline WBC ranging from 20-25 secondary to diagnosis of leukemia. Patient received one dose zosyn in ED, will check procalcitonin and evaluated need to continue antibiotics. CT showed possible airspace disease at lung bases, given that patient lives at assisted living facility will treat for possible HCAP.  -continue zosyn  -vanco x1  -f/u blood cultures  -elevated lactate 2.3, repeat at 10pm CT showed possible airspace disease at lung bases, given that patient lives at assisted living facility will treat for possible HCAP.  -continue zosyn  -vanco x1  -f/u blood cultures, MRSA nares, Legionella and strep Ag  -elevated lactate 2.3, repeat at 10pm CT showed possible airspace disease at lung bases, given that patient lives at assisted living facility will treat for possible HCAP.  -continue zosyn and start vanco  -f/u blood cultures, MRSA nares, Legionella and strep Ag  -elevated lactate 2.3, repeat at 10pm CT showed possible airspace disease at lung bases, given that patient lives at assisted living facility will treat for possible HCAP.  -continue zosyn and start vanco  -f/u blood cultures, MRSA nares, Legionella and strep Ag  -elevated lactate 2.3, repeat at 9pm

## 2018-01-22 NOTE — H&P ADULT - PROBLEM SELECTOR PLAN 10
SCDs, no pharmacologic VTE ppx in setting of possible GI bleed    IMPROVE VTE Individual Risk Assessment          RISK                                                          Points  [  ] Previous VTE                                                3  [  ] Thrombophilia                                             2  [ 2 ] Lower limb paralysis                                   2        (unable to hold up >15 seconds)    [ 2 ] Current Cancer                                             2         (within 6 months)  [  ] Immobilization > 24 hrs                              1  [  ] ICU/CCU stay > 24 hours                             1  [ 1 ] Age > 60                                                         1    IMPROVE VTE Score: 5 Influenza ppx: patient on tamiflu 30mg QD at assisted living facility, will continue  VTE ppx: SCDs, no pharmacologic VTE ppx in setting of possible GI bleed    IMPROVE VTE Individual Risk Assessment          RISK                                                          Points  [  ] Previous VTE                                                3  [  ] Thrombophilia                                             2  [ 2 ] Lower limb paralysis                                   2        (unable to hold up >15 seconds)    [ 2 ] Current Cancer                                             2         (within 6 months)  [  ] Immobilization > 24 hrs                              1  [  ] ICU/CCU stay > 24 hours                             1  [ 1 ] Age > 60                                                         1    IMPROVE VTE Score: 5

## 2018-01-22 NOTE — H&P ADULT - PROBLEM SELECTOR PLAN 5
continue keppra 500mg TID will hold ASA 81mg and Plavix 75mg at this time in setting of possible GI bleed

## 2018-01-22 NOTE — ED PROVIDER NOTE - MUSCULOSKELETAL, MLM
Spine appears normal, range of motion is not limited, no muscle or joint tenderness there is left sided hemiparesis with left foot in a post splint for foot drop all old no acute

## 2018-01-22 NOTE — H&P ADULT - NSHPREVIEWOFSYSTEMS_GEN_ALL_CORE
Constitutional: denies fever, chills, general malaise  HEENT: denies dry mouth, sore throat, runny nose  Respiratory: denies SOB, CERRATO, cough, sputum production, wheezing, hemoptysis  Cardiovascular: denies CP, palpitations, edema  Gastrointestinal: (+) one episode of dark red blood per rectum, denies nausea, vomiting, diarrhea, constipation, abdominal pain  Genitourinary: denies dysuria, frequency, urgency, incontinence, hematuria   Skin/Breast: denies rash, hives, itching, masses, hair loss   Musculoskeletal: denies myalgias, arthritis, joint swelling, muscle weakness  Neurologic: (+) chronic left sided weakness secondary to CVA, denies syncope, LOC, headache, dizziness    ROS negative except as noted above

## 2018-01-22 NOTE — H&P ADULT - NSHPSOCIALHISTORY_GEN_ALL_CORE
Social History:  Lives with: assisted living facility    Tobacco Usage: current everyday smoker, smokes 1-3 cigarettes per day for many years  Alcohol Usage: denies    Health Management  For male:  Colonoscopy: has never had

## 2018-01-22 NOTE — H&P ADULT - PROBLEM SELECTOR PLAN 3
will hold ASA 81mg and Plavix 75mg at this time in setting of possible GI bleed Lactate 2.3, no sign of active sepsis or infection. Will follow up repeat at 10pm. -no active treatment per patient  -WBC 24.4 today, per patient his baseline WBC ranges from 20-25

## 2018-01-22 NOTE — H&P ADULT - NSHPLABSRESULTS_GEN_ALL_CORE
14.9   24.4  )-----------( 140      ( 2018 14:17 )             45.7     2018 15:33    140    |  105    |  17     ----------------------------<  82     4.3     |  28     |  0.79     Ca    9.1        2018 15:33    TPro  7.0    /  Alb  3.7    /  TBili  0.5    /  DBili  x      /  AST  25     /  ALT  27     /  AlkPhos  100    2018 15:33    LIVER FUNCTIONS - ( 2018 15:33 )  Alb: 3.7 g/dL / Pro: 7.0 g/dL / ALK PHOS: 100 U/L / ALT: 27 U/L / AST: 25 U/L / GGT: x           PT/INR - ( 2018 14:17 )   PT: 12.6 sec;   INR: 1.15 ratio    PTT - ( 2018 14:17 )  PTT:29.7 sec    Urinalysis Basic - ( 2018 16:19 )  Color: Yellow / Appearance: Clear / S.015 / pH: x  Gluc: x / Ketone: Negative  / Bili: Negative / Urobili: Negative   Blood: x / Protein: Negative / Nitrite: Negative   Leuk Esterase: Negative / RBC: 0-2 /HPF / WBC Negative   Sq Epi: x / Non Sq Epi: Negative / Bacteria: Negative    CT abdomen/pelvis showed No hydronephrosis or bowel obstruction, airspace disease at the lung bases, possibly atelectatic, evidence of centrilobular emphysema  at the lung bases 14.9   24.4  )-----------( 140      ( 2018 14:17 )             45.7     2018 15:33    140    |  105    |  17     ----------------------------<  82     4.3     |  28     |  0.79     Ca    9.1        2018 15:33    TPro  7.0    /  Alb  3.7    /  TBili  0.5    /  DBili  x      /  AST  25     /  ALT  27     /  AlkPhos  100    2018 15:33    LIVER FUNCTIONS - ( 2018 15:33 )  Alb: 3.7 g/dL / Pro: 7.0 g/dL / ALK PHOS: 100 U/L / ALT: 27 U/L / AST: 25 U/L / GGT: x           PT/INR - ( 2018 14:17 )   PT: 12.6 sec;   INR: 1.15 ratio    PTT - ( 2018 14:17 )  PTT:29.7 sec    Urinalysis Basic - ( 2018 16:19 )  Color: Yellow / Appearance: Clear / S.015 / pH: x  Gluc: x / Ketone: Negative  / Bili: Negative / Urobili: Negative   Blood: x / Protein: Negative / Nitrite: Negative   Leuk Esterase: Negative / RBC: 0-2 /HPF / WBC Negative   Sq Epi: x / Non Sq Epi: Negative / Bacteria: Negative    EKG- NSR, HR 61bpm    CT abdomen/pelvis showed No hydronephrosis or bowel obstruction, airspace disease at the lung bases, possibly atelectatic, evidence of centrilobular emphysema  at the lung bases

## 2018-01-22 NOTE — H&P ADULT - PROBLEM SELECTOR PLAN 4
continue metoprolol 25mg BID will hold ASA 81mg and Plavix 75mg at this time in setting of possible GI bleed

## 2018-01-22 NOTE — ED ADULT NURSE NOTE - OBJECTIVE STATEMENT
Pt 72y M reports rectal bleeding, bright red, began yesterday, states he is on blood thinners, states he saw steaks of blood in stool, wheezing noted to upper lobes, slightly diminished lung sounds to the lower lobes bilat, pt denies sob, states he smokes 1 cigarette daily, history of stroke and has Left sided upper and lower extremity paralysis, uses a walker around the house, labs drawn and sent, advised on of care, verbalized understanding.

## 2018-01-22 NOTE — H&P ADULT - PMH
CAD (coronary artery disease)    CVA (cerebral vascular accident)    HLD (hyperlipidemia)    HTN (hypertension)    Leukemia    Seizure syndrome CAD (coronary artery disease)    CML (chronic myelocytic leukemia)    CVA (cerebral vascular accident)    HLD (hyperlipidemia)    HTN (hypertension)    Seizure syndrome

## 2018-01-22 NOTE — H&P ADULT - ASSESSMENT
71yo M with PMH of CVA (2010) with subsequent left sided hemiplegia and balance difficulties, CAD s/p stents x2 (2010), leukemia (diagnosed in 2008 but has never been treated per patient), seizure disorder (has had 1 seizure in the past) presents with one episode of dark red blood per rectum admitted for possible GI bleed. 73yo M with PMH of CVA (2010) with subsequent left sided hemiplegia and balance difficulties, CAD s/p stents x2 (2010), leukemia (diagnosed in 2008 but has never been treated per patient), seizure disorder (has had 1 seizure in the past) presents with one episode of dark red blood per rectum admitted for possible GI bleed, presumed HCAP. 71yo M with PMH of CVA (2010) with subsequent left sided hemiplegia and balance difficulties, CAD s/p stents x2 (2010), CML (diagnosed in 2008 but has never been treated per patient), seizure disorder (has had 1 seizure in the past) presents with one episode of dark red blood per rectum admitted for possible GI bleed, presumed HCAP.

## 2018-01-23 DIAGNOSIS — C91.10 CHRONIC LYMPHOCYTIC LEUKEMIA OF B-CELL TYPE NOT HAVING ACHIEVED REMISSION: ICD-10-CM

## 2018-01-23 DIAGNOSIS — K92.2 GASTROINTESTINAL HEMORRHAGE, UNSPECIFIED: ICD-10-CM

## 2018-01-23 LAB
ANION GAP SERPL CALC-SCNC: 9 MMOL/L — SIGNIFICANT CHANGE UP (ref 5–17)
BUN SERPL-MCNC: 10 MG/DL — SIGNIFICANT CHANGE UP (ref 7–23)
CALCIUM SERPL-MCNC: 8.8 MG/DL — SIGNIFICANT CHANGE UP (ref 8.5–10.1)
CHLORIDE SERPL-SCNC: 110 MMOL/L — HIGH (ref 96–108)
CO2 SERPL-SCNC: 23 MMOL/L — SIGNIFICANT CHANGE UP (ref 22–31)
CREAT SERPL-MCNC: 0.67 MG/DL — SIGNIFICANT CHANGE UP (ref 0.5–1.3)
CULTURE RESULTS: NO GROWTH — SIGNIFICANT CHANGE UP
EOSINOPHIL NFR BLD AUTO: 2 % — SIGNIFICANT CHANGE UP (ref 0–6)
GLUCOSE SERPL-MCNC: 89 MG/DL — SIGNIFICANT CHANGE UP (ref 70–99)
HCT VFR BLD CALC: 42.1 % — SIGNIFICANT CHANGE UP (ref 39–50)
HGB BLD-MCNC: 13.9 G/DL — SIGNIFICANT CHANGE UP (ref 13–17)
LYMPHOCYTES # BLD AUTO: 80 % — HIGH (ref 13–44)
MCHC RBC-ENTMCNC: 29.5 PG — SIGNIFICANT CHANGE UP (ref 27–34)
MCHC RBC-ENTMCNC: 33.1 GM/DL — SIGNIFICANT CHANGE UP (ref 32–36)
MCV RBC AUTO: 89.3 FL — SIGNIFICANT CHANGE UP (ref 80–100)
MONOCYTES NFR BLD AUTO: 3 % — SIGNIFICANT CHANGE UP (ref 1–9)
NEUTROPHILS NFR BLD AUTO: 14 % — LOW (ref 43–77)
PLATELET # BLD AUTO: 130 K/UL — LOW (ref 150–400)
POTASSIUM SERPL-MCNC: 3.5 MMOL/L — SIGNIFICANT CHANGE UP (ref 3.5–5.3)
POTASSIUM SERPL-SCNC: 3.5 MMOL/L — SIGNIFICANT CHANGE UP (ref 3.5–5.3)
RAPID RVP RESULT: SIGNIFICANT CHANGE UP
RBC # BLD: 4.71 M/UL — SIGNIFICANT CHANGE UP (ref 4.2–5.8)
RBC # FLD: 12.8 % — SIGNIFICANT CHANGE UP (ref 10.3–14.5)
SODIUM SERPL-SCNC: 142 MMOL/L — SIGNIFICANT CHANGE UP (ref 135–145)
SPECIMEN SOURCE: SIGNIFICANT CHANGE UP
WBC # BLD: 23.8 K/UL — HIGH (ref 3.8–10.5)
WBC # FLD AUTO: 23.8 K/UL — HIGH (ref 3.8–10.5)

## 2018-01-23 PROCEDURE — 99233 SBSQ HOSP IP/OBS HIGH 50: CPT

## 2018-01-23 RX ORDER — SOD SULF/SODIUM/NAHCO3/KCL/PEG
1000 SOLUTION, RECONSTITUTED, ORAL ORAL
Qty: 0 | Refills: 0 | Status: COMPLETED | OUTPATIENT
Start: 2018-01-23 | End: 2018-01-23

## 2018-01-23 RX ADMIN — PANTOPRAZOLE SODIUM 40 MILLIGRAM(S): 20 TABLET, DELAYED RELEASE ORAL at 12:14

## 2018-01-23 RX ADMIN — GABAPENTIN 100 MILLIGRAM(S): 400 CAPSULE ORAL at 06:48

## 2018-01-23 RX ADMIN — PIPERACILLIN AND TAZOBACTAM 25 GRAM(S): 4; .5 INJECTION, POWDER, LYOPHILIZED, FOR SOLUTION INTRAVENOUS at 06:15

## 2018-01-23 RX ADMIN — PREGABALIN 1000 MICROGRAM(S): 225 CAPSULE ORAL at 12:13

## 2018-01-23 RX ADMIN — LEVETIRACETAM 500 MILLIGRAM(S): 250 TABLET, FILM COATED ORAL at 22:16

## 2018-01-23 RX ADMIN — LEVETIRACETAM 500 MILLIGRAM(S): 250 TABLET, FILM COATED ORAL at 06:48

## 2018-01-23 RX ADMIN — ATORVASTATIN CALCIUM 40 MILLIGRAM(S): 80 TABLET, FILM COATED ORAL at 22:16

## 2018-01-23 RX ADMIN — Medication 250 MILLIGRAM(S): at 08:13

## 2018-01-23 RX ADMIN — Medication 25 MILLIGRAM(S): at 06:48

## 2018-01-23 RX ADMIN — GABAPENTIN 100 MILLIGRAM(S): 400 CAPSULE ORAL at 18:44

## 2018-01-23 RX ADMIN — Medication 30 MILLIGRAM(S): at 12:15

## 2018-01-23 RX ADMIN — PIPERACILLIN AND TAZOBACTAM 25 GRAM(S): 4; .5 INJECTION, POWDER, LYOPHILIZED, FOR SOLUTION INTRAVENOUS at 16:12

## 2018-01-23 RX ADMIN — LEVETIRACETAM 500 MILLIGRAM(S): 250 TABLET, FILM COATED ORAL at 13:20

## 2018-01-23 RX ADMIN — Medication 250 MILLIGRAM(S): at 20:03

## 2018-01-23 RX ADMIN — Medication 25 MILLIGRAM(S): at 18:44

## 2018-01-23 RX ADMIN — Medication 1000 UNIT(S): at 12:13

## 2018-01-23 RX ADMIN — SODIUM CHLORIDE 75 MILLILITER(S): 9 INJECTION INTRAMUSCULAR; INTRAVENOUS; SUBCUTANEOUS at 08:13

## 2018-01-23 RX ADMIN — PIPERACILLIN AND TAZOBACTAM 25 GRAM(S): 4; .5 INJECTION, POWDER, LYOPHILIZED, FOR SOLUTION INTRAVENOUS at 22:16

## 2018-01-23 RX ADMIN — Medication 1000 MILLILITER(S): at 20:29

## 2018-01-23 NOTE — PROGRESS NOTE ADULT - SUBJECTIVE AND OBJECTIVE BOX
Patient is a 72y old  Male who presents with a chief complaint of dark red blood per rectum (2018 19:12)      INTERVAL HPI: Pt seen and examined. Denies any acute complaints. Admits to occasional cough nonproductive, some chills at times.     OVERNIGHT EVENTS: none noted  T(F): 97.9 (18 @ 06:49), Max: 97.9 (18 @ 06:49)  HR: 64 (18 @ 06:49) (64 - 82)  BP: 161/69 (18 @ 06:49) (139/75 - 161/69)  RR: 16 (18 @ 06:49) (15 - 16)  SpO2: 97% (18 @ 06:49) (97% - 98%)  I&O's Summary      REVIEW OF SYSTEMS:   12 systems negative other than that stated in hpi    PHYSICAL EXAM:  GENERAL: NAD, well-groomed, well-developed, elder  HEAD:  Atraumatic, Normocephalic  EYES: EOMI, PERRLA, conjunctiva and sclera clear  ENMT: No tonsillar erythema, exudates, or enlargement; Moist mucous membranes, Good dentition, No lesions  NERVOUS SYSTEM:  Alert & Oriented X3, Good concentration; Motor Strength 5/5 B/L upper and lower extremities; DTRs 2+ intact and symmetric  CHEST/LUNG: some decreased bs b/l; No rales, rhonchi, wheezing, or rubs  HEART: Regular rate and rhythm; No murmurs, rubs, or gallops  ABDOMEN: Soft, Nontender, Nondistended; Bowel sounds present  EXTREMITIES:  2+ Peripheral Pulses, No clubbing, cyanosis, or edema  SKIN: No rashes or lesions    LABS:                        13.9   23.8  )-----------( 130      ( 2018 07:14 )             42.1         142  |  110<H>  |  10  ----------------------------<  89  3.5   |  23  |  0.67    Ca    8.8      2018 07:14    TPro  7.0  /  Alb  3.7  /  TBili  0.5  /  DBili  x   /  AST  25  /  ALT  27  /  AlkPhos  100      PT/INR - ( 2018 14:17 )   PT: 12.6 sec;   INR: 1.15 ratio         PTT - ( 2018 14:17 )  PTT:29.7 sec  Urinalysis Basic - ( 2018 16:19 )    Color: Yellow / Appearance: Clear / S.015 / pH: x  Gluc: x / Ketone: Negative  / Bili: Negative / Urobili: Negative   Blood: x / Protein: Negative / Nitrite: Negative   Leuk Esterase: Negative / RBC: 0-2 /HPF / WBC Negative   Sq Epi: x / Non Sq Epi: Negative / Bacteria: Negative      CAPILLARY BLOOD GLUCOSE                  MEDICATIONS  (STANDING):  atorvastatin 40 milliGRAM(s) Oral at bedtime  cholecalciferol 1000 Unit(s) Oral daily  cyanocobalamin 1000 MICROGram(s) Oral daily  gabapentin 100 milliGRAM(s) Oral two times a day  levETIRAcetam 500 milliGRAM(s) Oral three times a day  metoprolol     tartrate 25 milliGRAM(s) Oral two times a day  oseltamivir 30 milliGRAM(s) Oral daily  pantoprazole  Injectable 40 milliGRAM(s) IV Push daily  piperacillin/tazobactam IVPB. 3.375 Gram(s) IV Intermittent every 8 hours  polyethylene glycol/electrolyte Solution 1000 milliLiter(s) Oral every 3 hours  sodium chloride 0.9%. 1000 milliLiter(s) (75 mL/Hr) IV Continuous <Continuous>  vancomycin  IVPB 1000 milliGRAM(s) IV Intermittent every 12 hours  vancomycin  IVPB        MEDICATIONS  (PRN):

## 2018-01-23 NOTE — INPATIENT CERTIFICATION FOR MEDICARE PATIENTS - RISKS OF ADVERSE EVENTS
Concern for delay in diagnosis and treatment/Other:/Concern for cardiopulmonary deterioration/Concern for worsening infectious process

## 2018-01-23 NOTE — CONSULT NOTE ADULT - PROBLEM SELECTOR RECOMMENDATION 9
monitor CBC, transfuse prn,  hold anticoagulation  IR/CRS evaluation if decompensates   colonoscopy in AM

## 2018-01-23 NOTE — PROGRESS NOTE ADULT - PROBLEM SELECTOR PLAN 2
CT showed possible airspace disease at lung bases, given that patient lives at assisted living facility will treat for possible HCAP.  -continue zosyn and  vanco  -f/u blood cultures, MRSA nares, Legionella and strep Ag  -lactate repeat now wnl  -will check pct if neg will stop antibx

## 2018-01-23 NOTE — PROGRESS NOTE ADULT - ASSESSMENT
73yo M with PMH of CVA (2010) with subsequent left sided hemiplegia and balance difficulties, CAD s/p stents x2 (2010), CML (diagnosed in 2008 but has never been treated per patient), seizure disorder (has had 1 seizure in the past) presents with one episode of dark red blood per rectum admitted for possible GI bleed, presumed HCAP.

## 2018-01-23 NOTE — PROGRESS NOTE ADULT - PROBLEM SELECTOR PLAN 8
Influenza ppx: patient on tamiflu 30mg QD at assisted living facility( presumed prohylaxis, awaiting callback), will continue ppx dose pending RVP panel results  VTE ppx: SCDs, no pharmacologic VTE ppx in setting of possible GI bleed    IMPROVE VTE Individual Risk Assessment          RISK                                                          Points  [  ] Previous VTE                                                3  [  ] Thrombophilia                                             2  [ 2 ] Lower limb paralysis                                   2        (unable to hold up >15 seconds)    [ 2 ] Current Cancer                                             2         (within 6 months)  [  ] Immobilization > 24 hrs                              1  [  ] ICU/CCU stay > 24 hours                             1  [ 1 ] Age > 60                                                         1    IMPROVE VTE Score: 5

## 2018-01-24 ENCOUNTER — RESULT REVIEW (OUTPATIENT)
Age: 73
End: 2018-01-24

## 2018-01-24 DIAGNOSIS — Z01.818 ENCOUNTER FOR OTHER PREPROCEDURAL EXAMINATION: ICD-10-CM

## 2018-01-24 LAB
ANION GAP SERPL CALC-SCNC: 14 MMOL/L — SIGNIFICANT CHANGE UP (ref 5–17)
BUN SERPL-MCNC: 7 MG/DL — SIGNIFICANT CHANGE UP (ref 7–23)
CALCIUM SERPL-MCNC: 8.3 MG/DL — LOW (ref 8.5–10.1)
CHLORIDE SERPL-SCNC: 111 MMOL/L — HIGH (ref 96–108)
CO2 SERPL-SCNC: 21 MMOL/L — LOW (ref 22–31)
CREAT SERPL-MCNC: 0.71 MG/DL — SIGNIFICANT CHANGE UP (ref 0.5–1.3)
EOSINOPHIL NFR BLD AUTO: 1 % — SIGNIFICANT CHANGE UP (ref 0–6)
GLUCOSE SERPL-MCNC: 94 MG/DL — SIGNIFICANT CHANGE UP (ref 70–99)
HCT VFR BLD CALC: 41.1 % — SIGNIFICANT CHANGE UP (ref 39–50)
HGB BLD-MCNC: 13.3 G/DL — SIGNIFICANT CHANGE UP (ref 13–17)
LEGIONELLA AG UR QL: NEGATIVE — SIGNIFICANT CHANGE UP
LYMPHOCYTES # BLD AUTO: 74 % — HIGH (ref 13–44)
MCHC RBC-ENTMCNC: 28.4 PG — SIGNIFICANT CHANGE UP (ref 27–34)
MCHC RBC-ENTMCNC: 32.3 GM/DL — SIGNIFICANT CHANGE UP (ref 32–36)
MCV RBC AUTO: 88.1 FL — SIGNIFICANT CHANGE UP (ref 80–100)
MONOCYTES NFR BLD AUTO: 3 % — SIGNIFICANT CHANGE UP (ref 1–9)
MRSA PCR RESULT.: SIGNIFICANT CHANGE UP
MRSA PCR RESULT.: SIGNIFICANT CHANGE UP
NEUTROPHILS NFR BLD AUTO: 17 % — LOW (ref 43–77)
PLATELET # BLD AUTO: 125 K/UL — LOW (ref 150–400)
POTASSIUM SERPL-MCNC: 3.4 MMOL/L — LOW (ref 3.5–5.3)
POTASSIUM SERPL-SCNC: 3.4 MMOL/L — LOW (ref 3.5–5.3)
PROCALCITONIN SERPL-MCNC: <0.05 NG/ML — HIGH (ref 0–0.04)
RBC # BLD: 4.66 M/UL — SIGNIFICANT CHANGE UP (ref 4.2–5.8)
RBC # FLD: 12.5 % — SIGNIFICANT CHANGE UP (ref 10.3–14.5)
S AUREUS DNA NOSE QL NAA+PROBE: SIGNIFICANT CHANGE UP
S AUREUS DNA NOSE QL NAA+PROBE: SIGNIFICANT CHANGE UP
SODIUM SERPL-SCNC: 146 MMOL/L — HIGH (ref 135–145)
VANCOMYCIN TROUGH SERPL-MCNC: 7.6 UG/ML — LOW (ref 10–20)
WBC # BLD: 23.4 K/UL — HIGH (ref 3.8–10.5)
WBC # FLD AUTO: 23.4 K/UL — HIGH (ref 3.8–10.5)

## 2018-01-24 PROCEDURE — 71260 CT THORAX DX C+: CPT | Mod: 26

## 2018-01-24 PROCEDURE — 88305 TISSUE EXAM BY PATHOLOGIST: CPT | Mod: 26

## 2018-01-24 PROCEDURE — 99233 SBSQ HOSP IP/OBS HIGH 50: CPT

## 2018-01-24 RX ORDER — POTASSIUM CHLORIDE 20 MEQ
40 PACKET (EA) ORAL ONCE
Qty: 0 | Refills: 0 | Status: COMPLETED | OUTPATIENT
Start: 2018-01-24 | End: 2018-01-24

## 2018-01-24 RX ADMIN — SODIUM CHLORIDE 75 MILLILITER(S): 9 INJECTION INTRAMUSCULAR; INTRAVENOUS; SUBCUTANEOUS at 10:06

## 2018-01-24 RX ADMIN — GABAPENTIN 100 MILLIGRAM(S): 400 CAPSULE ORAL at 05:45

## 2018-01-24 RX ADMIN — LEVETIRACETAM 500 MILLIGRAM(S): 250 TABLET, FILM COATED ORAL at 05:45

## 2018-01-24 RX ADMIN — PREGABALIN 1000 MICROGRAM(S): 225 CAPSULE ORAL at 12:37

## 2018-01-24 RX ADMIN — LEVETIRACETAM 500 MILLIGRAM(S): 250 TABLET, FILM COATED ORAL at 22:57

## 2018-01-24 RX ADMIN — Medication 40 MILLIEQUIVALENT(S): at 12:37

## 2018-01-24 RX ADMIN — Medication 1000 UNIT(S): at 12:37

## 2018-01-24 RX ADMIN — PIPERACILLIN AND TAZOBACTAM 25 GRAM(S): 4; .5 INJECTION, POWDER, LYOPHILIZED, FOR SOLUTION INTRAVENOUS at 05:46

## 2018-01-24 RX ADMIN — SODIUM CHLORIDE 75 MILLILITER(S): 9 INJECTION INTRAMUSCULAR; INTRAVENOUS; SUBCUTANEOUS at 20:09

## 2018-01-24 RX ADMIN — Medication 1000 MILLILITER(S): at 00:22

## 2018-01-24 RX ADMIN — PANTOPRAZOLE SODIUM 40 MILLIGRAM(S): 20 TABLET, DELAYED RELEASE ORAL at 12:37

## 2018-01-24 RX ADMIN — Medication 25 MILLIGRAM(S): at 05:46

## 2018-01-24 RX ADMIN — GABAPENTIN 100 MILLIGRAM(S): 400 CAPSULE ORAL at 18:00

## 2018-01-24 RX ADMIN — Medication 25 MILLIGRAM(S): at 18:00

## 2018-01-24 RX ADMIN — ATORVASTATIN CALCIUM 40 MILLIGRAM(S): 80 TABLET, FILM COATED ORAL at 22:57

## 2018-01-24 RX ADMIN — Medication 250 MILLIGRAM(S): at 10:04

## 2018-01-24 RX ADMIN — Medication 30 MILLIGRAM(S): at 12:37

## 2018-01-24 NOTE — PROGRESS NOTE ADULT - PROBLEM SELECTOR PLAN 2
CT showed possible airspace disease at lung bases, given that patient lives at assisted living facility will treat for possible HCAP, likely will dc antibx if pct negative as pt afebril and leukocytosis is chronic from cll  -continue zosyn and  vanco  -f/u blood cultures, MRSA nares, Legionella and strep Ag  -lactate repeat now wnl  -will check pct if neg will stop antibx

## 2018-01-24 NOTE — PROGRESS NOTE ADULT - SUBJECTIVE AND OBJECTIVE BOX
Patient is a 72y old  Male who presents with a chief complaint of "I was bleeding" (2018 09:14)      INTERVAL HPI: Pt seen and examined. States feels cold but no chills or fevers, denies cough or bloody stools.     OVERNIGHT EVENTS: none noted  T(F): 97.4 (18 @ 04:54), Max: 97.7 (18 @ 01:00)  HR: 71 (18 @ 04:54) (65 - 77)  BP: 156/79 (18 @ 04:54) (140/68 - 156/79)  RR: 16 (18 @ 04:54) (16 - 18)  SpO2: 96% (18 @ 04:54) (96% - 97%)  I&O's Summary      REVIEW OF SYSTEMS: 12 systems noncontributory other than that stated in Hpi    PHYSICAL EXAM:  GENERAL: NAD, elder  HEAD:  Atraumatic, Normocephalic  EYES: EOMI, PERRLA, conjunctiva and sclera clear  ENMT: No tonsillar erythema, exudates, or enlargement; Moist mucous membranes, Good dentition, No lesions  NERVOUS SYSTEM:  Alert & Oriented X3, Good concentration; Motor Strength 5/5 B/L upper and lower extremities; DTRs 2+ intact and symmetric  CHEST/LUNG: Clear to percussion bilaterally; No rales, rhonchi, wheezing, or rubs  HEART: Regular rate and rhythm; No murmurs, rubs, or gallops  ABDOMEN: Soft, Nontender, Nondistended; Bowel sounds present  EXTREMITIES:  2+ Peripheral Pulses, No clubbing, cyanosis, or edema  SKIN: No rashes or lesions    LABS:                        13.3   23.4  )-----------( 125      ( 2018 08:00 )             41.1     -    146<H>  |  111<H>  |  7   ----------------------------<  94  3.4<L>   |  21<L>  |  0.71    Ca    8.3<L>      2018 08:00    TPro  7.0  /  Alb  3.7  /  TBili  0.5  /  DBili  x   /  AST  25  /  ALT  27  /  AlkPhos  100      PT/INR - ( 2018 14:17 )   PT: 12.6 sec;   INR: 1.15 ratio         PTT - ( 2018 14:17 )  PTT:29.7 sec  Urinalysis Basic - ( 2018 16:19 )    Color: Yellow / Appearance: Clear / S.015 / pH: x  Gluc: x / Ketone: Negative  / Bili: Negative / Urobili: Negative   Blood: x / Protein: Negative / Nitrite: Negative   Leuk Esterase: Negative / RBC: 0-2 /HPF / WBC Negative   Sq Epi: x / Non Sq Epi: Negative / Bacteria: Negative      CAPILLARY BLOOD GLUCOSE           @ 09:05   No growth to date.  --  --   @ 22:56   No growth  --  --          MEDICATIONS  (STANDING):  atorvastatin 40 milliGRAM(s) Oral at bedtime  cholecalciferol 1000 Unit(s) Oral daily  cyanocobalamin 1000 MICROGram(s) Oral daily  gabapentin 100 milliGRAM(s) Oral two times a day  levETIRAcetam 500 milliGRAM(s) Oral three times a day  metoprolol     tartrate 25 milliGRAM(s) Oral two times a day  oseltamivir 30 milliGRAM(s) Oral daily  pantoprazole  Injectable 40 milliGRAM(s) IV Push daily  piperacillin/tazobactam IVPB. 3.375 Gram(s) IV Intermittent every 8 hours  potassium chloride    Tablet ER 40 milliEquivalent(s) Oral once  sodium chloride 0.9%. 1000 milliLiter(s) (75 mL/Hr) IV Continuous <Continuous>  vancomycin  IVPB 1000 milliGRAM(s) IV Intermittent every 12 hours  vancomycin  IVPB        MEDICATIONS  (PRN):

## 2018-01-24 NOTE — PROGRESS NOTE ADULT - PROBLEM SELECTOR PLAN 8
Influenza ppx: patient on tamiflu 30mg QD at assisted living facility( presumed prohylaxis, awaiting callback, inpt pharmacy [facility] to help with follow up appreciated), will continue ppx dose pending RVP panel results  VTE ppx: SCDs, no pharmacologic VTE ppx in setting of possible GI bleed    IMPROVE VTE Individual Risk Assessment          RISK                                                          Points  [  ] Previous VTE                                                3  [  ] Thrombophilia                                             2  [ 2 ] Lower limb paralysis                                   2        (unable to hold up >15 seconds)    [ 2 ] Current Cancer                                             2         (within 6 months)  [  ] Immobilization > 24 hrs                              1  [  ] ICU/CCU stay > 24 hours                             1  [ 1 ] Age > 60                                                         1    IMPROVE VTE Score: 5

## 2018-01-24 NOTE — PROGRESS NOTE ADULT - ASSESSMENT
71yo M with PMH of CVA (2010) with subsequent left sided hemiplegia and balance difficulties, CAD s/p stents x2 (2010), CML (diagnosed in 2008 but has never been treated per patient), seizure disorder (has had 1 seizure in the past) presents with one episode of dark red blood per rectum admitted for possible GI bleed, presumed HCAP.

## 2018-01-25 DIAGNOSIS — K63.9 DISEASE OF INTESTINE, UNSPECIFIED: ICD-10-CM

## 2018-01-25 LAB
ANION GAP SERPL CALC-SCNC: 8 MMOL/L — SIGNIFICANT CHANGE UP (ref 5–17)
BUN SERPL-MCNC: 10 MG/DL — SIGNIFICANT CHANGE UP (ref 7–23)
CALCIUM SERPL-MCNC: 8.5 MG/DL — SIGNIFICANT CHANGE UP (ref 8.5–10.1)
CHLORIDE SERPL-SCNC: 111 MMOL/L — HIGH (ref 96–108)
CO2 SERPL-SCNC: 23 MMOL/L — SIGNIFICANT CHANGE UP (ref 22–31)
CREAT SERPL-MCNC: 0.77 MG/DL — SIGNIFICANT CHANGE UP (ref 0.5–1.3)
EOSINOPHIL NFR BLD AUTO: 1 % — SIGNIFICANT CHANGE UP (ref 0–6)
GLUCOSE SERPL-MCNC: 109 MG/DL — HIGH (ref 70–99)
HCT VFR BLD CALC: 38.4 % — LOW (ref 39–50)
HGB BLD-MCNC: 13 G/DL — SIGNIFICANT CHANGE UP (ref 13–17)
LYMPHOCYTES # BLD AUTO: 70 % — HIGH (ref 13–44)
MAGNESIUM SERPL-MCNC: 1.9 MG/DL — SIGNIFICANT CHANGE UP (ref 1.6–2.6)
MCHC RBC-ENTMCNC: 30 PG — SIGNIFICANT CHANGE UP (ref 27–34)
MCHC RBC-ENTMCNC: 33.7 GM/DL — SIGNIFICANT CHANGE UP (ref 32–36)
MCV RBC AUTO: 88.8 FL — SIGNIFICANT CHANGE UP (ref 80–100)
MONOCYTES NFR BLD AUTO: 7 % — SIGNIFICANT CHANGE UP (ref 1–9)
NEUTROPHILS NFR BLD AUTO: 17 % — LOW (ref 43–77)
PHOSPHATE SERPL-MCNC: 3 MG/DL — SIGNIFICANT CHANGE UP (ref 2.5–4.5)
PLATELET # BLD AUTO: 118 K/UL — LOW (ref 150–400)
POTASSIUM SERPL-MCNC: 3.7 MMOL/L — SIGNIFICANT CHANGE UP (ref 3.5–5.3)
POTASSIUM SERPL-SCNC: 3.7 MMOL/L — SIGNIFICANT CHANGE UP (ref 3.5–5.3)
RBC # BLD: 4.32 M/UL — SIGNIFICANT CHANGE UP (ref 4.2–5.8)
RBC # FLD: 12.6 % — SIGNIFICANT CHANGE UP (ref 10.3–14.5)
S PNEUM AG UR QL: NEGATIVE — SIGNIFICANT CHANGE UP
SODIUM SERPL-SCNC: 142 MMOL/L — SIGNIFICANT CHANGE UP (ref 135–145)
WBC # BLD: 21.5 K/UL — HIGH (ref 3.8–10.5)
WBC # FLD AUTO: 21.5 K/UL — HIGH (ref 3.8–10.5)

## 2018-01-25 PROCEDURE — 99223 1ST HOSP IP/OBS HIGH 75: CPT

## 2018-01-25 PROCEDURE — 99233 SBSQ HOSP IP/OBS HIGH 50: CPT | Mod: GC

## 2018-01-25 RX ADMIN — ATORVASTATIN CALCIUM 40 MILLIGRAM(S): 80 TABLET, FILM COATED ORAL at 21:52

## 2018-01-25 RX ADMIN — Medication 25 MILLIGRAM(S): at 06:56

## 2018-01-25 RX ADMIN — LEVETIRACETAM 500 MILLIGRAM(S): 250 TABLET, FILM COATED ORAL at 21:52

## 2018-01-25 RX ADMIN — LEVETIRACETAM 500 MILLIGRAM(S): 250 TABLET, FILM COATED ORAL at 15:11

## 2018-01-25 RX ADMIN — GABAPENTIN 100 MILLIGRAM(S): 400 CAPSULE ORAL at 19:04

## 2018-01-25 RX ADMIN — PREGABALIN 1000 MICROGRAM(S): 225 CAPSULE ORAL at 15:11

## 2018-01-25 RX ADMIN — Medication 25 MILLIGRAM(S): at 19:04

## 2018-01-25 RX ADMIN — GABAPENTIN 100 MILLIGRAM(S): 400 CAPSULE ORAL at 06:56

## 2018-01-25 RX ADMIN — PANTOPRAZOLE SODIUM 40 MILLIGRAM(S): 20 TABLET, DELAYED RELEASE ORAL at 15:12

## 2018-01-25 RX ADMIN — LEVETIRACETAM 500 MILLIGRAM(S): 250 TABLET, FILM COATED ORAL at 06:56

## 2018-01-25 RX ADMIN — SODIUM CHLORIDE 75 MILLILITER(S): 9 INJECTION INTRAMUSCULAR; INTRAVENOUS; SUBCUTANEOUS at 09:39

## 2018-01-25 RX ADMIN — Medication 1000 UNIT(S): at 15:10

## 2018-01-25 NOTE — PHYSICAL THERAPY INITIAL EVALUATION ADULT - RANGE OF MOTION EXAMINATION, REHAB EVAL
bilateral upper extremity ROM was WFL (within functional limits)/bilateral lower extremity ROM was WFL (within functional limits)/left upper extremity was PROM due to CVA

## 2018-01-25 NOTE — PROGRESS NOTE ADULT - SUBJECTIVE AND OBJECTIVE BOX
INTERVAL HPI/OVERNIGHT EVENTS:  HPI:  73yo M with PMH of CVA (2010) with subsequent left sided hemiplegia and balance difficulties, CAD s/p stents x2 (2010), CML (diagnosed in 2008 but has never been treated per patient), seizure disorder (has had 1 seizure in the past) presents with one episode of dark red blood per rectum. Patient states that he went to urinate earlier today and noted dark red blood come out from his "backside." He did not have a bowel movement, it was only blood. He has never experienced anything like this in the past. Up until today he has been in his usual state of health. Denies any fevers, chills, HA, dizziness, CP, palp, SOB, cough, abd pain, N/V/D, dysuria, hematuria.  No new overnight event.  No N/V/D.  Tolerating diet.  s/p colonscopy large sigmoid mass    MEDICATIONS  (STANDING):  atorvastatin 40 milliGRAM(s) Oral at bedtime  cholecalciferol 1000 Unit(s) Oral daily  cyanocobalamin 1000 MICROGram(s) Oral daily  gabapentin 100 milliGRAM(s) Oral two times a day  levETIRAcetam 500 milliGRAM(s) Oral three times a day  metoprolol     tartrate 25 milliGRAM(s) Oral two times a day  pantoprazole  Injectable 40 milliGRAM(s) IV Push daily  sodium chloride 0.9%. 1000 milliLiter(s) (75 mL/Hr) IV Continuous <Continuous>    MEDICATIONS  (PRN):      Allergies    No Known Allergies    Intolerances          General:  No wt loss, fevers, chills, night sweats, fatigue,   Eyes:  Good vision, no reported pain  ENT:  No sore throat, pain, runny nose, dysphagia  CV:  No pain, palpitations, hypo/hypertension  Resp:  No dyspnea, cough, tachypnea, wheezing  GI:  No pain, No nausea, No vomiting, No diarrhea, No constipation, No weight loss, No fever, No pruritis, No rectal bleeding, No tarry stools, No dysphagia,  :  No pain, bleeding, incontinence, nocturia  Muscle:  No pain, weakness  Neuro:  No weakness, tingling, memory problems  Psych:  No fatigue, insomnia, mood problems, depression  Endocrine:  No polyuria, polydipsia, cold/heat intolerance  Heme:  No petechiae, ecchymosis, easy bruisability  Skin:  No rash, tattoos, scars, edema      PHYSICAL EXAM:   Vital Signs:  Vital Signs Last 24 Hrs  T(C): 36.9 (25 Jan 2018 05:31), Max: 36.9 (25 Jan 2018 05:31)  T(F): 98.5 (25 Jan 2018 05:31), Max: 98.5 (25 Jan 2018 05:31)  HR: 68 (25 Jan 2018 05:31) (60 - 74)  BP: 144/69 (25 Jan 2018 05:31) (105/79 - 165/69)  BP(mean): --  RR: 18 (25 Jan 2018 05:31) (16 - 20)  SpO2: 95% (25 Jan 2018 05:31) (95% - 100%)  Daily     Daily I&O's Summary    24 Jan 2018 07:01  -  25 Jan 2018 07:00  --------------------------------------------------------  IN: 900 mL / OUT: 0 mL / NET: 900 mL        GENERAL:  Appears stated age, well-groomed, well-nourished, no distress  HEENT:  NC/AT,  conjunctivae clear and pink, no thyromegaly, nodules, adenopathy, no JVD, sclera -anicteric  CHEST:  Full & symmetric excursion, no increased effort, breath sounds clear  HEART:  Regular rhythm, S1, S2, no murmur/rub/S3/S4, no abdominal bruit, no edema  ABDOMEN:  Soft, non-tender, non-distended, normoactive bowel sounds,  no masses ,no hepato-splenomegaly, no signs of chronic liver disease  EXTEREMITIES:  no cyanosis,clubbing or edema  SKIN:  No rash/erythema/ecchymoses/petechiae/wounds/abscess/warm/dry  NEURO:  Alert, oriented, no asterixis, no tremor, no encephalopathy      LABS:                        13.0   21.5  )-----------( 118      ( 25 Jan 2018 07:56 )             38.4     01-24    146<H>  |  111<H>  |  7   ----------------------------<  94  3.4<L>   |  21<L>  |  0.71    Ca    8.3<L>      24 Jan 2018 08:00          amylase   lipase  RADIOLOGY & ADDITIONAL TESTS:

## 2018-01-25 NOTE — PROGRESS NOTE ADULT - ATTENDING COMMENTS
71yo M with PMH of CVA (2010) with subsequent left sided hemiplegia and balance difficulties, CAD s/p stents x2 (2010), CLL (diagnosed in 2008 but has never been treated per patient), seizure disorder (has had 1 seizure in the past) presents with one episode of dark red blood per rectum admitted for lower GI bleed with finding of sigmoid mass on colonoscopy. Plan: apprec cardio recs and evaluation, left message with son and pmd, apprec onco recs, surgery scheduled for monday, await mass biopsy results from colonoscopy likely tomorrow, monitor clincial course, PT recommending rehab postop

## 2018-01-25 NOTE — PHYSICAL THERAPY INITIAL EVALUATION ADULT - IMPAIRMENTS CONTRIBUTING IMPAIRED BED MOBILITY, REHAB EVAL
impaired postural control/impaired motor control/impaired balance/abnormal muscle tone/decreased strength

## 2018-01-25 NOTE — PHYSICAL THERAPY INITIAL EVALUATION ADULT - IMPAIRED TRANSFERS: SIT/STAND, REHAB EVAL
decreased strength/abnormal muscle tone/impaired balance/impaired postural control/impaired motor control

## 2018-01-25 NOTE — PROGRESS NOTE ADULT - PROBLEM SELECTOR PLAN 9
Influenza ppx: patient on tamiflu 30mg QD at assisted living facility( presumed prohylaxis, awaiting callback, inpt pharmacy [facility] to help with follow up appreciated), will continue ppx dose pending RVP panel results  VTE ppx: SCDs, no pharmacologic VTE ppx in setting of possible GI bleed    IMPROVE VTE Individual Risk Assessment          RISK                                                          Points  [  ] Previous VTE                                                3  [  ] Thrombophilia                                             2  [ 2 ] Lower limb paralysis                                   2        (unable to hold up >15 seconds)    [ 2 ] Current Cancer                                             2         (within 6 months)  [  ] Immobilization > 24 hrs                              1  [  ] ICU/CCU stay > 24 hours                             1  [ 1 ] Age > 60                                                         1    IMPROVE VTE Score: 5 RVP negative, no need at this time for flu ppx  VTE ppx: SCDs, no pharmacologic VTE ppx in setting of possible GI bleed    IMPROVE VTE Individual Risk Assessment          RISK                                                          Points  [  ] Previous VTE                                                3  [  ] Thrombophilia                                             2  [ 2 ] Lower limb paralysis                                   2        (unable to hold up >15 seconds)    [ 2 ] Current Cancer                                             2         (within 6 months)  [  ] Immobilization > 24 hrs                              1  [  ] ICU/CCU stay > 24 hours                             1  [ 1 ] Age > 60                                                         1    IMPROVE VTE Score: 5

## 2018-01-25 NOTE — PROGRESS NOTE ADULT - SUBJECTIVE AND OBJECTIVE BOX
Interval History:  asked to re-evaluate patient after colonoscopy noted submucosal mass.  path is pending. no pain or bleeding    Chart reviewed and events noted;   Overnight events:    MEDICATIONS  (STANDING):  atorvastatin 40 milliGRAM(s) Oral at bedtime  cholecalciferol 1000 Unit(s) Oral daily  cyanocobalamin 1000 MICROGram(s) Oral daily  gabapentin 100 milliGRAM(s) Oral two times a day  levETIRAcetam 500 milliGRAM(s) Oral three times a day  metoprolol     tartrate 25 milliGRAM(s) Oral two times a day  pantoprazole  Injectable 40 milliGRAM(s) IV Push daily    MEDICATIONS  (PRN):      Vital Signs Last 24 Hrs  T(C): 36.9 (25 Jan 2018 05:31), Max: 36.9 (25 Jan 2018 05:31)  T(F): 98.5 (25 Jan 2018 05:31), Max: 98.5 (25 Jan 2018 05:31)  HR: 68 (25 Jan 2018 05:31) (60 - 74)  BP: 144/69 (25 Jan 2018 05:31) (105/79 - 165/69)  BP(mean): --  RR: 18 (25 Jan 2018 05:31) (16 - 20)  SpO2: 95% (25 Jan 2018 05:31) (95% - 100%)    PHYSICAL EXAM  General: adult in NAD  HEENT: clear oropharynx, anicteric sclera, pink conjunctivae  Neck: supple  CV: normal S1S2 with no murmur rubs or gallops  Lungs: clear to auscultation, no wheezes, no rhales  Abdomen: soft non-tender non-distended, no hepato/splenomegaly  Ext: no clubbing cyanosis or edema  Skin: no rashes and no petichiae  Neuro: alert and oriented X3 no focal deficits      LABS:  CBC Full  -  ( 25 Jan 2018 07:56 )  WBC Count : 21.5 K/uL  Hemoglobin : 13.0 g/dL  Hematocrit : 38.4 %  Platelet Count - Automated : 118 K/uL  Mean Cell Volume : 88.8 fl  Mean Cell Hemoglobin : 30.0 pg  Mean Cell Hemoglobin Concentration : 33.7 gm/dL  Auto Neutrophil # : x  Auto Lymphocyte # : x  Auto Monocyte # : x  Auto Eosinophil # : x  Auto Basophil # : x  Auto Neutrophil % : 17.0 %  Auto Lymphocyte % : 70.0 %  Auto Monocyte % : 7.0 %  Auto Eosinophil % : 1.0 %  Auto Basophil % : x    01-25    142  |  111<H>  |  10  ----------------------------<  109<H>  3.7   |  23  |  0.77    Ca    8.5      25 Jan 2018 07:56  Phos  3.0     01-25  Mg     1.9     01-25          fe studies      WBC trend  21.5 K/uL (01-25-18 @ 07:56)  23.4 K/uL (01-24-18 @ 08:00)  23.8 K/uL (01-23-18 @ 07:14)  24.4 K/uL (01-22-18 @ 14:17)      Hgb trend  13.0 g/dL (01-25-18 @ 07:56)  13.3 g/dL (01-24-18 @ 08:00)  13.9 g/dL (01-23-18 @ 07:14)  14.9 g/dL (01-22-18 @ 14:17)      plt trend  118 K/uL (01-25-18 @ 07:56)  125 K/uL (01-24-18 @ 08:00)  130 K/uL (01-23-18 @ 07:14)  140 K/uL (01-22-18 @ 14:17)        RADIOLOGY & ADDITIONAL STUDIES:    < from: CT Abdomen and Pelvis w/ Oral Cont and w/ IV Cont (01.22.18 @ 18:22) >  EXAM:  CT ABDOMEN AND PELVIS OC IC                            PROCEDURE DATE:  01/22/2018          INTERPRETATION:  Clinical information: Rectal bleeding    There are no prior studies present for comparison    Axial images obtained, coronal and sagittal images computer reformatted.   Intravenous and oral contrast material administered, 100 cc of Omnipaque   350 intravenously administered.    Airspace disease at the lung bases, possibly atelectatic. Evidence of   centrilobular emphysema  at the lung bases. Multiple small bulla evident.  Coronary artery calcifications visible.    Hepatic parenchyma homogeneous. Postcholecystectomy clips present. The   spleen is not enlarged. No adrenal lesions evident. The pancreas is   unremarkable. Kidneys show no obstructive changes. Hypodensities in the   right kidney have the appearance of cysts.     Ectasia, infrarenal abdominal aorta measuring 2.7 cm, functional lumen   surrounded by thrombus. Advise follow-up monitoring to evaluate for   development of aortic aneurysm.    No retroperitoneal lymphadenopathy. No ascites. No biliary ductal   dilatation. No retroperitoneal lymphadenopathy.    No bowel obstruction. No sign of pneumatosis. The large bowel is filled   with stool. Diverticulosis present, extensive in the sigmoid region. No   sign of diverticulitis. Urinary bladder is filled, no stones evident. The   prostate is mildly enlarged. No free pelvic fluid evident. Inguinal   regions intact. No evidence of appendicitis.  Disc degenerativedisease L5-S1. Grade 1 retrolisthesis L5 over S1.   Posterior spurring L4, L5.    Tiny umbilical hernia, fat-containing, present.    IMPRESSION: No hydronephrosis or bowel obstruction. See multiple   additional findings as described above. Follow-up recommendations.                  SAM LEAL M.D.,ATTENDING RADIOLOGIST  This document has been electronically signed. Jan 22 2018  6:21PM        < end of copied text >    < from: CT Chest w/ IV Cont (01.24.18 @ 19:07) >  EXAM:  CT CHEST IC                            PROCEDURE DATE:  01/24/2018          INTERPRETATION:  CLINICAL INFORMATION:  Recent sigmoid mass found    PROCEDURE:  Using multislice helical CT, thin sections were obtained from   the thoracic inlet through the lung bases. 100 cc of Omnipaque 350 IV   contrast were administered    COMPARISON: None available    FINDINGS:      There is bilateral hilar lymphadenopathy. There is shotty mediastinal   lymphadenopathy. There is coronary artery calcification. Representative   right hilar node measures up to 1.7 cm on axial image 54. There is no   pleural or pericardial effusion. There is atherosclerotic calcification   of aorta.    Centrilobular emphysema with extensive bullous change There is no   evidence of infiltrate, mass or nodule.    The osseous structures demonstrate degenerative changes of the spine.    The upper abdomen is remarkable for cholecystectomy clips and cyst in the   upper pole the right kidney..    IMPRESSION: Bullous emphysema.  Hilar adenopathy  No evidence of lung mass  Coronary artery calcification and atherosclerotic calcification of aorta.  Status post cholecystectomy  Right renal cyst                            WOLFGANG ROTHMAN M.D.,ATTENDING RADIOLOGIST  This documenthas been electronically signed. Jan 24 2018  7:24PM        < end of copied text >

## 2018-01-25 NOTE — PHYSICAL THERAPY INITIAL EVALUATION ADULT - CRITERIA FOR SKILLED THERAPEUTIC INTERVENTIONS
risk reduction/prevention/rehab potential/predicted duration of therapy intervention/impairments found/functional limitations in following categories/anticipated discharge recommendation

## 2018-01-25 NOTE — PHYSICAL THERAPY INITIAL EVALUATION ADULT - MANUAL MUSCLE TESTING RESULTS, REHAB EVAL
left upper extremity 0/5 throughout, right upper extremity at least 4+/5, right lower extremity at least 4+/5, left lower extremity hip flexion, knee extension 3/5, ankle DF 1/5

## 2018-01-25 NOTE — PROGRESS NOTE ADULT - ASSESSMENT
71 y/o man w hx CVA,HTN, HLD, presents to ER w 2 episode of blood per rectum. Notes on CBC with leukocytosis  WBC ~24k, Hgb normal, platelets 130-140k.  Pt reports dx'd "leukemia"while in [place] ~2010, followed by Oncologist there every few months, post bone marrow exam, never needing treatment and usual WBC ~25k.  Review of WBC differential show increased lymphocytes ~80% and Peripheral blood morphology w majority of WBC mature lymphocytes. Findings c/w Chronic Lymphocytic Leukemia(CLL), not CML(Chronic Myelogenous Leukemia) as documented in H&P.  Pt has normal Hgb and preserved platelets, no palpable or symptomatic lymphadenopathy or organomegaly, no "B" symptoms. And according to him, present level of WBC similar to baseline.  Pt has stable CLL, no need for acute intervention, can continue serial f/u as outpatient  Diagnosis does not contribute to pt's bleeding symptomas.    GI evaluation with colonoscopy showing submucosal mass with pathology pending. CT scan without evidence of metastatic disease  to have surgical evaluation

## 2018-01-25 NOTE — CONSULT NOTE ADULT - ASSESSMENT
71 y/o man w hx CVA,HTN, HLD, presents to ER w 2 episode of blood per rectum. Notes on CBC with leukocytosis  WBC ~24k, Hgb normal, platelets 130-140k.  Pt reports dx'd "leukemia"while in [place] ~2010, followed by Oncologist there every few months, post bone marrow exam, never needing treatment and usual WBC ~25k.  Review of WBC differential show increased lymphocytes ~80% and Peripheral blood morphology w majority of WBC mature lymphocytes. Findings c/w Chronic Lymphocytic Leukemia(CLL), not CML(Chronic Myelogenous Leukemia) as documented in H&P.  Pt has normal Hgb and preserved platelets, no palpable or symptomatic lymphadenopathy or organomegaly, no "B" symptoms. And according to him, present level of WBC similar to baseline.  Pt has stable CLL, no need for acute intervention, can continue serial f/u as outpatient  Diagnosis does not contribute to pt's bleeding symptomas.  Continue present w/u, GI eval.    Will sign off, please call if any questions  Thank you
73 yo presents with rectal bleeding. Colonoscopy showed submucosal mass in sigmoid.  Tattoed. Biopsies taken.  CT shows no signs of liver lesions, LAD in sigmoid.   D/W pt findings possibililty of surgery       -CT chest and CEA ordered       -Oncology following        -f/U biopsy       -will cont following
Rubin remains hemodynamically stable at present with a history of stroke, coronary artery disease status post percutaneous intervention 2010, CML, seizure disorder, and acute gastrointestinal bleed. Evaluation shows a large mass in the sigmoid colon for which she is scheduled for possible followup biopsy and surgery with colectomy. Visit no further bleeding he has had no symptoms of chest pain, dyspnea, or palpitations. Given his stability with no evidence for angina or heart failure his usual activities, he is in optimal cardiovascular condition for any planned surgery and no further cardiac evaluation/risk stratification as needed.    Recommend    Proceed with planned surgery using routine hemodynamic monitoring  Continue beta blocker therapy with metop 25 mg bid  Continue statin therapy for now  I spoke at length with the family and left a message with his community cardiologist Dr. Aden at [phone] and [phone] to discuss this but am awaiting call back  will follow

## 2018-01-25 NOTE — PHYSICAL THERAPY INITIAL EVALUATION ADULT - IMPAIRMENTS CONTRIBUTING TO GAIT DEVIATIONS, PT EVAL
impaired balance/decreased strength/impaired postural control/impaired motor control/abnormal muscle tone

## 2018-01-25 NOTE — PROGRESS NOTE ADULT - ASSESSMENT
71yo M with PMH of CVA (2010) with subsequent left sided hemiplegia and balance difficulties, CAD s/p stents x2 (2010), CML (diagnosed in 2008 but has never been treated per patient), seizure disorder (has had 1 seizure in the past) presents with one episode of dark red blood per rectum admitted for possible GI bleed, presumed HCAP. 71yo M with PMH of CVA (2010) with subsequent left sided hemiplegia and balance difficulties, CAD s/p stents x2 (2010), CLL (diagnosed in 2008 but has never been treated per patient), seizure disorder (has had 1 seizure in the past) presents with one episode of dark red blood per rectum admitted for possible GI bleed, presumed HCAP. 73yo M with PMH of CVA (2010) with subsequent left sided hemiplegia and balance difficulties, CAD s/p stents x2 (2010), CLL (diagnosed in 2008 but has never been treated per patient), seizure disorder (has had 1 seizure in the past) presents with one episode of dark red blood per rectum admitted for lower GI bleed with finding of sigmoid mass.

## 2018-01-25 NOTE — PROGRESS NOTE ADULT - PROBLEM SELECTOR PLAN 3
CT showed possible airspace disease at lung bases, given that patient lives at assisted living facility will treat for possible HCAP, likely will dc antibx if pct negative as pt afebril and leukocytosis is chronic from cll  -continue zosyn and  vanco  -f/u blood cultures, MRSA nares, Legionella and strep Ag  -lactate repeat now wnl  -will check pct if neg will stop antibx CT showed possible airspace disease at lung bases, given that patient lives at assisted living facility will treat for possible HCAP, antibiotics d/c'd as procal was negative  -off vanc and zosyn  -blood cultures 1/23 negative x2  - legionella and strep pna negative  -lactate repeat now wnl ruled out  CT showed possible airspace disease at lung bases, given that patient lives at assisted living facility will treat for possible HCAP, antibiotics d/c'd as procal was negative  -off vanc and zosyn  -blood cultures 1/23 negative x2  - legionella and strep pna negative  -lactate repeat now wnl

## 2018-01-25 NOTE — PROGRESS NOTE ADULT - PROBLEM SELECTOR PLAN 5
will hold ASA 81mg and Plavix 75mg at this time in setting of possible GI bleed  -continue atorvastatin

## 2018-01-25 NOTE — PROGRESS NOTE ADULT - SUBJECTIVE AND OBJECTIVE BOX
The patient was interviewed and evaluated.    72y Male    T(C): 36.9 (01-25-18 @ 05:31), Max: 36.9 (01-25-18 @ 05:31)  HR: 68 (01-25-18 @ 05:31) (60 - 69)  BP: 144/69 (01-25-18 @ 05:31) (133/57 - 165/69)  RR: 18 (01-25-18 @ 05:31) (16 - 20)  SpO2: 95% (01-25-18 @ 05:31) (95% - 100%)  Wt(kg): --    No Nausea/vomiting, recall, sore throat or headache.    No anesthesia related complaints or sequelae.    No additional recommendations.

## 2018-01-25 NOTE — PROGRESS NOTE ADULT - PROBLEM SELECTOR PLAN 1
Colonoscopy yesterday showed large mass in sigmoid colon.   -follow up biopsy  -f/u CEA level  -Oncology- Dr. Hsieh following  -Cardio-[facility]. Cardiac clearance for possible colectomy. Spoke to daughter over phone with patient at bedside. Pt does not need stress test before surgery. Colonoscopy yesterday showed large mass in sigmoid colon.   -follow up biopsy  -f/u CEA level  -Oncology- Dr. Hsieh following  -Cardio-[facility]. Cardiac clearance for possible colectomy. Spoke to daughter over phone with patient at bedside. Pt does not need stress test before surgery.  -multiple calls made to Dr. Aden. Dr. Garibay left 2 messages and is awaiting call back.

## 2018-01-25 NOTE — CONSULT NOTE ADULT - SUBJECTIVE AND OBJECTIVE BOX
All records reviewed.    HPI:  71yo man with hx CVA () with subsequent left sided hemiplegia and balance difficulties, CAD s/p stents x2 (),, seizure disorder,presents with 2 episodes of BRPBR, no associated abdomen pain/diarrhea/constipation/dizziness/malaise.    also noted in History and Physical is CML dx'd  and never needed treatment    CBC here w WBC ~24k, ~80% lymphocytes, preserved Hgb and Plts 130-140k    Pt reports dx'd w "eukemia"in [place] ~, post bone marrow exam, WBC usually ~25k, never needed treatment and serial CBC no sig change. Since moving to  has not seen Heme/Onc but his PMD is monitoring    No night sweats/fever/anorexia/weight loss/lymphadenopathy    PAST MEDICAL & SURGICAL HISTORY:  "Leukemia"  Seizure syndrome  HTN (hypertension)  CVA (cerebral vascular accident)  HLD (hyperlipidemia)  CAD (coronary artery disease)  S/P coronary artery stent placement  H/O major orthopedic surgery: left ankle  S/P cholecystectomy      Review of System:    MEDICATIONS  (STANDING):  atorvastatin 40 milliGRAM(s) Oral at bedtime  cholecalciferol 1000 Unit(s) Oral daily  cyanocobalamin 1000 MICROGram(s) Oral daily  gabapentin 100 milliGRAM(s) Oral two times a day  levETIRAcetam 500 milliGRAM(s) Oral three times a day  metoprolol     tartrate 25 milliGRAM(s) Oral two times a day  oseltamivir 30 milliGRAM(s) Oral daily  pantoprazole  Injectable 40 milliGRAM(s) IV Push daily  piperacillin/tazobactam IVPB. 3.375 Gram(s) IV Intermittent every 8 hours  polyethylene glycol/electrolyte Solution 1000 milliLiter(s) Oral every 3 hours  sodium chloride 0.9%. 1000 milliLiter(s) (75 mL/Hr) IV Continuous <Continuous>  vancomycin  IVPB 1000 milliGRAM(s) IV Intermittent every 12 hours  vancomycin  IVPB        MEDICATIONS  (PRN):      Allergies    No Known Allergies    Intolerances        SOCIAL HISTORY:    FAMILY HISTORY:  No pertinent family history in first degree relatives      Vital Signs Last 24 Hrs  T(C): 36.6 (2018 06:49), Max: 36.6 (2018 19:56)  T(F): 97.9 (2018 06:49), Max: 97.9 (2018 06:49)  HR: 64 (2018 06:49) (64 - 82)  BP: 161/69 (2018 06:49) (139/75 - 161/69)  BP(mean): --  RR: 16 (2018 06:49) (15 - 16)  SpO2: 97% (2018 06:49) (97% - 98%)    PHYSICAL EXAM:      General:well developed well nourished, in no acute distress  Eyes:sclera anicteric, pupils equal and reactive to light  ENMT:buccal mucosa moist, pharynx not injected  Neck:supple, trachea midline  Lungs:clear, no wheeze/rhonchi  Cardiovascular:regular rate and rhythm, S1 S2  Abdomen:soft, nontender, no organomegaly present, bowel sounds normal, pouchy  Neurological:alert and oriented x3, Cranial Nerves II-XII grossly intact  Skin:no increased ecchymosis/petechiae/purpura  Lymph Nodes:no palpable cervical/supraclavicular lymph nodes enlargements  Extremities:no cyanosis/clubbing/edema        LABS:                        13.9   23.8  )-----------( 130      ( 2018 07:14 )             42.1      @ 07:14  WBC23.8  RBC4.71 Hgb13.9 Hct42.1  MCV89.3  Lmpf888  Auto Kruxgn97.0 Band1.0 Auto Lymph80.0 Atypical Lymph-- Reactive Lymph-- Auto Mono3.0 Auto Eos2.0 Auto Baso--         @ 14:17  WBC24.4  RBC5.13 Hgb14.9 Hct45.7  MCV89.1  Otdj886  Auto Pevfgl88.0 Band-- Auto Lymph86.0 Atypical Lymph-- Reactive Lymph-- Auto Mono2.0 Auto Eos-- Auto Baso1.0              142  |  110<H>  |  10  ----------------------------<  89  3.5   |  23  |  0.67    Ca    8.8      2018 07:14    TPro  7.0  /  Alb  3.7  /  TBili  0.5  /  DBili  x   /  AST  25  /  ALT  27  /  AlkPhos  100   @ 14:17  PT12.6 INR1.15  PTT29.7    Urinalysis Basic - ( 2018 16:19 )    Color: Yellow / Appearance: Clear / S.015 / pH: x  Gluc: x / Ketone: Negative  / Bili: Negative / Urobili: Negative   Blood: x / Protein: Negative / Nitrite: Negative   Leuk Esterase: Negative / RBC: 0-2 /HPF / WBC Negative   Sq Epi: x / Non Sq Epi: Negative / Bacteria: Negative        PERIPHERAL BLOOD SMEAR REVIEW:  RBC-normocytic, normochromic, no significant anisocytosis or poikilocytosis. No rouleaux/schistocytes or increased polychromasia.  WBC-slight increased in number, majority are mature compact lymphocytes, no smudge cells, immature or abnormal cells seen.  Platelets-adequate on smear, no platelets clumping or giant platelets.       RADIOLOGY & ADDITIONAL STUDIES:
Chief Complaint:  Patient is a 72y old  Male who presents with a chief complaint of dark red blood per rectum (2018 19:12)    CML (chronic myelocytic leukemia)  Leukemia  Seizure syndrome  HTN (hypertension)  CVA (cerebral vascular accident)  HLD (hyperlipidemia)  CAD (coronary artery disease)  S/P coronary artery stent placement  H/O major orthopedic surgery  S/P cholecystectomy     HPI:  71yo M with PMH of CVA () with subsequent left sided hemiplegia and balance difficulties, CAD s/p stents x2 (), CML (diagnosed in  but has never been treated per patient), seizure disorder (has had 1 seizure in the past) presents with one episode of dark red blood per rectum. Patient states that he went to urinate earlier today and noted dark red blood come out from his "backside." He did not have a bowel movement, it was only blood. He has never experienced anything like this in the past. Up until today he has been in his usual state of health. Denies any fevers, chills, HA, dizziness, CP, palp, SOB, cough, abd pain, N/V/D, dysuria, hematuria.    In ED, VSS, WBC 24.4 (patient states he has a chronically elevated WBC ranging from 20-25 secondary to leukemia), H/H 14.9/45.7, lactate 2.3, FOBT (+), UA (-). CT abdomen/pelvis showed No hydronephrosis or bowel obstruction, airspace disease at the lung bases, possibly atelectatic, evidence of centrilobular emphysema  at the lung bases. Received zosyn x1. (2018 19:12)      No Known Allergies      atorvastatin 40 milliGRAM(s) Oral at bedtime  cholecalciferol 1000 Unit(s) Oral daily  cyanocobalamin 1000 MICROGram(s) Oral daily  gabapentin 100 milliGRAM(s) Oral two times a day  levETIRAcetam 500 milliGRAM(s) Oral three times a day  metoprolol     tartrate 25 milliGRAM(s) Oral two times a day  oseltamivir 30 milliGRAM(s) Oral daily  pantoprazole  Injectable 40 milliGRAM(s) IV Push daily  piperacillin/tazobactam IVPB. 3.375 Gram(s) IV Intermittent every 8 hours  polyethylene glycol/electrolyte Solution 1000 milliLiter(s) Oral every 3 hours  sodium chloride 0.9%. 1000 milliLiter(s) IV Continuous <Continuous>  vancomycin  IVPB 1000 milliGRAM(s) IV Intermittent every 12 hours  vancomycin  IVPB            FAMILY HISTORY:  No pertinent family history in first degree relatives        Review of Systems:    General:  No wt loss, fevers, chills, night sweats,fatigue,   Eyes:  Good vision, no reported pain  ENT:  No sore throat, pain, runny nose, dysphagia  CV:  No pain, palpitatioins, hypo/hypertension  Resp:  No dyspnea, cough, tachypnea, wheezing  :  No pain, bleeding, incontinence, nocturia  Muscle:  No pain, weakness  Neuro:  No weakness, tingling, memory problems  Psych:  No fatigue, insomnia, mood problems, depression  Endocrine:  No polyuria, polydypsia, cold/heat intolerance  Heme:  No petechiae, ecchymosis, easy bruisability  Skin:  No rash, tattoos, scars, edema    Relevant Family History:       Relevant Social History:       Physical Exam:    Vital Signs:  Vital Signs Last 24 Hrs  T(C): 36.6 (2018 06:49), Max: 36.6 (2018 19:56)  T(F): 97.9 (2018 06:49), Max: 97.9 (2018 06:49)  HR: 64 (2018 06:49) (64 - 95)  BP: 161/69 (2018 06:49) (132/76 - 161/69)  BP(mean): --  RR: 16 (2018 06:49) (15 - 16)  SpO2: 97% (2018 06:49) (96% - 98%)  Daily Height in cm: 175.26 (2018 12:36)    Daily     General:  Appears stated age, well-groomed, well-nourished, no distress  HEENT:  NC/AT,  conjunctivae clear and pink, no thyromegaly, nodules, adenopathy, no JVD  Chest:  Full & symmetric excursion, no increased effort, breath sounds clear  Cardiovascular:  Regular rhythm, S1, S2, no murmur/rub/S3/S4, no abdominal bruit, no edema  Abdomen:  Soft, non-tender, non-distended, normoactive bowel sounds,  no masses ,no hepatosplenomeagaly, no signs of chronic liver disease  Extremities:  no cyanosis,clubbing or edema  Skin:  No rash/erythema/ecchymoses/petechiae/wounds/abscess/warm/dry  Neuro/Psych:  Alert, oriented, no asterixis, no tremor, no encephalopathy    Laboratory:                            13.9   23.8  )-----------( 130      ( 2018 07:14 )             42.1         142  |  110<H>  |  10  ----------------------------<  89  3.5   |  23  |  0.67    Ca    8.8      2018 07:14    TPro  7.0  /  Alb  3.7  /  TBili  0.5  /  DBili  x   /  AST  25  /  ALT  27  /  AlkPhos  100      LIVER FUNCTIONS - ( 2018 15:33 )  Alb: 3.7 g/dL / Pro: 7.0 g/dL / ALK PHOS: 100 U/L / ALT: 27 U/L / AST: 25 U/L / GGT: x           PT/INR - ( 2018 14:17 )   PT: 12.6 sec;   INR: 1.15 ratio         PTT - ( 2018 14:17 )  PTT:29.7 sec  Urinalysis Basic - ( 2018 16:19 )    Color: Yellow / Appearance: Clear / S.015 / pH: x  Gluc: x / Ketone: Negative  / Bili: Negative / Urobili: Negative   Blood: x / Protein: Negative / Nitrite: Negative   Leuk Esterase: Negative / RBC: 0-2 /HPF / WBC Negative   Sq Epi: x / Non Sq Epi: Negative / Bacteria: Negative        Imaging:
[facility] Consultation    CHIEF COMPLAINT: Patient is a 72y old  Male who presents with a chief complaint of "I was bleeding" (24 Jan 2018 09:14)      HPI:  71yo M with PMH of CVA (2010) with subsequent left sided hemiplegia and balance difficulties, CAD s/p stents x2 (2010), CML (diagnosed in 2008 but has never been treated per patient), seizure disorder (has had 1 seizure in the past) presents with one episode of dark red blood per rectum. Patient states that he went to urinate earlier today and noted dark red blood come out from his "backside." He did not have a bowel movement, it was only blood. He has never experienced anything like this in the past. Up until today he has been in his usual state of health. Denies any fevers, chills, HA, dizziness, CP, palp, SOB, cough, abd pain, N/V/D, dysuria, hematuria.    In ED, VSS, WBC 24.4 (patient states he has a chronically elevated WBC ranging from 20-25 secondary to leukemia), H/H 14.9/45.7, lactate 2.3, FOBT (+), UA (-). CT abdomen/pelvis showed No hydronephrosis or bowel obstruction, airspace disease at the lung bases, possibly atelectatic, evidence of centrilobular emphysema  at the lung bases. Received zosyn x1. (22 Jan 2018 19:12)    He is currently awake and alert without specific complaints. He reports no bleeding. When he performs his daily activities, he reports no chest pain, dyspnea, palpitations, lightheadedness, or syncope.      PAST MEDICAL & SURGICAL HISTORY:  CML (chronic myelocytic leukemia)  Seizure syndrome  HTN (hypertension)  CVA (cerebral vascular accident)  HLD (hyperlipidemia)  CAD (coronary artery disease)  S/P coronary artery stent placement  H/O major orthopedic surgery: left ankle  S/P cholecystectomy      SOCIAL HISTORY: no tob/etoh    FAMILY HISTORY:   No pertinent family history in first degree relatives      MEDICATIONS  (STANDING):  atorvastatin 40 milliGRAM(s) Oral at bedtime  cholecalciferol 1000 Unit(s) Oral daily  cyanocobalamin 1000 MICROGram(s) Oral daily  gabapentin 100 milliGRAM(s) Oral two times a day  levETIRAcetam 500 milliGRAM(s) Oral three times a day  metoprolol     tartrate 25 milliGRAM(s) Oral two times a day  pantoprazole  Injectable 40 milliGRAM(s) IV Push daily  sodium chloride 0.9%. 1000 milliLiter(s) (75 mL/Hr) IV Continuous <Continuous>    Allergies    No Known Allergies    REVIEW OF SYSTEMS:    CONSTITUTIONAL: No weakness, fevers or chills  EYES: No visual changes, No diplopia  ENMT: No throat pain , No exudate  NECK: No pain or stiffness  RESPIRATORY: No cough, wheezing, hemoptysis; No shortness of breath  CARDIOVASCULAR: No chest pain or palpitations  GASTROINTESTINAL: as per HPI  GENITOURINARY: No dysuria, frequency or hematuria  NEUROLOGICAL: No numbness or weakness  SKIN: No itching or rash  All other review of systems is negative unless indicated above    VITAL SIGNS:   Vital Signs Last 24 Hrs  T(C): 36.9 (25 Jan 2018 05:31), Max: 36.9 (25 Jan 2018 05:31)  T(F): 98.5 (25 Jan 2018 05:31), Max: 98.5 (25 Jan 2018 05:31)  HR: 68 (25 Jan 2018 05:31) (60 - 74)  BP: 144/69 (25 Jan 2018 05:31) (105/79 - 165/69)  BP(mean): --  RR: 18 (25 Jan 2018 05:31) (16 - 20)  SpO2: 95% (25 Jan 2018 05:31) (95% - 100%)    I&O's Summary    24 Jan 2018 07:01  -  25 Jan 2018 07:00  --------------------------------------------------------  IN: 900 mL / OUT: 0 mL / NET: 900 mL        PHYSICAL EXAM:    Constitutional: NAD, awake and alert, well-developed  Eyes:  EOMI,  Pupils round, no lesions  ENMT: no exudate or erythema  Pulmonary: Non-labored, breath sounds are clear bilaterally, No wheezing, rales or rhonchi  Cardiovascular: PMI not palpable non-displaced Regular S1 and S2, no murmurs, rubs, gallops or clicks  Gastrointestinal: Bowel Sounds present, soft, nontender.   Lymph: No peripheral edema. No cervical lymphadenopathy.  Neurological: Alert, no focal deficits  Skin: No rashes. Changes of chronic venous stasis. No cyanosis.  Psych:  Mood & affect appropriate    LABS: All Labs Reviewed:                        13.0   21.5  )-----------( 118      ( 25 Jan 2018 07:56 )             38.4                         13.3   23.4  )-----------( 125      ( 24 Jan 2018 08:00 )             41.1                         13.9   23.8  )-----------( 130      ( 23 Jan 2018 07:14 )             42.1     25 Jan 2018 07:56    142    |  111    |  10     ----------------------------<  109    3.7     |  23     |  0.77   24 Jan 2018 08:00    146    |  111    |  7      ----------------------------<  94     3.4     |  21     |  0.71   23 Jan 2018 07:14    142    |  110    |  10     ----------------------------<  89     3.5     |  23     |  0.67     Ca    8.5        25 Jan 2018 07:56  Ca    8.3        24 Jan 2018 08:00  Ca    8.8        23 Jan 2018 07:14  Phos  3.0       25 Jan 2018 07:56  Mg     1.9       25 Jan 2018 07:56    TPro  7.0    /  Alb  3.7    /  TBili  0.5    /  DBili  x      /  AST  25     /  ALT  27     /  AlkPhos  100    22 Jan 2018 15:33      < from: 12 Lead ECG (01.22.18 @ 14:11) >    Ventricular Rate 61 BPM    Atrial Rate 61 BPM    P-R Interval 158 ms    QRS Duration 70 ms    Q-T Interval 432 ms    QTC Calculation(Bezet) 434 ms    P Axis 55 degrees    R Axis 11 degrees    T Axis 29 degrees    Diagnosis Line Normal sinus rhythm  Normal ECG    Confirmed by Santosh Knox ([id number]) on 1/23/2018 11:41:11 AM    < end of copied text >    < from: CT Chest w/ IV Cont (01.24.18 @ 19:07) >    EXAM:  CT CHEST IC                            PROCEDURE DATE:  01/24/2018          INTERPRETATION:  CLINICAL INFORMATION:  Recent sigmoid mass found    PROCEDURE:  Using multislice helical CT, thin sections were obtained from   the thoracic inlet through the lung bases. 100 cc of Omnipaque 350 IV   contrast were administered    COMPARISON: None available    FINDINGS:      There is bilateral hilar lymphadenopathy. There is shotty mediastinal   lymphadenopathy. There is coronary artery calcification. Representative   right hilar node measures up to 1.7 cm on axial image 54. There is no   pleural or pericardial effusion. There is atherosclerotic calcification   of aorta.    Centrilobular emphysema with extensive bullous change There is no   evidence of infiltrate, mass or nodule.    The osseous structures demonstrate degenerative changes of the spine.    The upper abdomen is remarkable for cholecystectomy clips and cyst in the   upper pole the right kidney..    IMPRESSION: Bullous emphysema.  Hilar adenopathy  No evidence of lung mass  Coronary artery calcification and atherosclerotic calcification of aorta.  Status post cholecystectomy  Right renal cyst                            WOLFGANG ROTHMAN M.D.,ATTENDING RADIOLOGIST  This documenthas been electronically signed. Jan 24 2018  7:24PM                < end of copied text >
pt is a 71 yo male presents with bleeding per rectum. PT was in USOH and noticed bleeding per rectum x 2 days.  As per pt not significant amount of blood.  No hx of bleeding in past.  Pt has not had any hx of colonoscopy also.  No other complaints at this time.      REVIEW OF SYSTEMS      General: denies any fever /chills    Skin/Breast: denies rashes   	  Ophthalmologic: denies any visual disturbances  	  Respiratory and Thorax: Denies any SOB, cough  	  Cardiovascular:	denies CP , palpitation     Gastrointestinal:	 as above,  No Nausea, vomiting, diarrhea    Genitourinary:	denies dysuria    Musculoskeletal:	 denies any muscle weakness    Neurological:	denies headaches    PAST MEDICAL   CML (chronic myelocytic leukemia)  Seizure syndrome  HTN (hypertension)  CVA (cerebral vascular accident)  HLD (hyperlipidemia)  CAD (coronary artery disease)    Past surgical  S/P coronary artery stent placement  H/O major orthopedic surgery: left ankle  S/P cholecystectomy    Home Medications:  Aspir 81:  orally  (22 Jan 2018 19:06)  atorvastatin 40 mg oral tablet: 1 tab(s) orally once a day (22 Jan 2018 19:06)  gabapentin 100 mg oral capsule: 1 cap(s) orally 2 times a day (22 Jan 2018 19:06)  levETIRAcetam 500 mg oral tablet: 1 tab(s) orally 3 times a day (22 Jan 2018 19:06)  metoprolol tartrate 25 mg oral tablet: 1 tab(s) orally 2 times a day (22 Jan 2018 19:06)  mometasone 0.1% topical cream: Apply topically to affected area 3 times a day right scapula (22 Jan 2018 19:06)  Plavix 75 mg oral tablet: 1 tab(s) orally once a day (22 Jan 2018 19:06)  Sarna 0.5%-0.5% topical lotion: Apply topically to affected area 3 times a day, As Needed dry skin (22 Jan 2018 19:06)  Tamiflu 30 mg oral capsule: 1 cap(s) orally once a day (22 Jan 2018 19:06)  traMADol 50 mg oral tablet: 1 tab(s) orally 3 times a day (with meals), As Needed (22 Jan 2018 19:06)  Vitamin B12 1000 mcg oral tablet: 1 tab(s) orally once a day (22 Jan 2018 19:06)  Vitamin D3 1000 intl units oral capsule: 1 cap(s) orally once a day (22 Jan 2018 19:06)      Allergies    No Known Allergies    Intolerances      SH: 2-3 cigarettes a day, denies etoh, denies drugs    ICU Vital Signs Last 24 Hrs  T(C): 36.7 (24 Jan 2018 16:09), Max: 36.8 (24 Jan 2018 13:25)  T(F): 98.1 (24 Jan 2018 16:09), Max: 98.2 (24 Jan 2018 13:25)  HR: 63 (24 Jan 2018 16:09) (60 - 77)  BP: 145/58 (24 Jan 2018 16:09) (105/79 - 158/70)  BP(mean): --  ABP: --  ABP(mean): --  RR: 20 (24 Jan 2018 16:09) (16 - 20)  SpO2: 98% (24 Jan 2018 16:09) (96% - 99%)  PHYSICAL EXAM:      Constitutional:  NAD, in bed comfortable    Eyes: PERRL b/l    Neck: supple, no JVD    Respiratory: Clear b/l    Cardiovascular: reg rate and rhythm    Gastrointestinal:  soft, Mild LLQ tenderness, -rebound, -guarding, +BS    Extremities: FROM x4,  no edema    Lymph Nodes: No cervical, inguinal LAD                            13.3   23.4  )-----------( 125      ( 24 Jan 2018 08:00 )             41.1   01-24    146<H>  |  111<H>  |  7   ----------------------------<  94  3.4<L>   |  21<L>  |  0.71    Ca    8.3<L>      24 Jan 2018 08:00    CT- no lesions, masses seen

## 2018-01-25 NOTE — PROGRESS NOTE ADULT - SUBJECTIVE AND OBJECTIVE BOX
Patient is a 72y old  Male who presents with a chief complaint of "I was bleeding" (24 Jan 2018 09:14)      INTERVAL HPI: Pt seen and examined. No acute events overnight. Denies fevers, chills, n/v, chest pain, sob, abdominal pain, rectal bleeding.     REVIEW OF SYSTEMS: 12 systems noncontributory other than that stated in Hpi    PHYSICAL EXAM:  GENERAL: NAD, elder  HEAD:  Atraumatic, Normocephalic  EYES: EOMI, PERRLA, conjunctiva and sclera clear  ENMT: No tonsillar erythema, exudates, or enlargement; Moist mucous membranes  NERVOUS SYSTEM:  Alert & Oriented X3, Good concentration; Motor Strength 1/5 LUE and RUE, 5/5 strength in [place] and LUE  CHEST/LUNG: Clear to percussion bilaterally; No rales, rhonchi, wheezing, or rubs  HEART: Regular rate and rhythm; No murmurs, rubs, or gallops  ABDOMEN: Soft, Nontender, Nondistended; Bowel sounds present, no guarding, no rebound  EXTREMITIES:  2+ Peripheral Pulses, No clubbing, cyanosis, or edema  SKIN: No rashes or lesions    LABS:                        13.0   21.5  )-----------( 118      ( 25 Jan 2018 07:56 )             38.4     25 Jan 2018 07:56    142    |  111    |  10     ----------------------------<  109    3.7     |  23     |  0.77     Ca    8.5        25 Jan 2018 07:56  Phos  3.0       25 Jan 2018 07:56  Mg     1.9       25 Jan 2018 07:56    Culture - Blood (01.23.18 @ 09:05)    Specimen Source: .Blood Blood    Culture Results: No growth to date.    Culture - Blood (01.23.18 @ 09:05)    Specimen Source: .Blood Blood    Culture Results: No growth to date.    MEDICATIONS  (STANDING):  atorvastatin 40 milliGRAM(s) Oral at bedtime  cholecalciferol 1000 Unit(s) Oral daily  cyanocobalamin 1000 MICROGram(s) Oral daily  gabapentin 100 milliGRAM(s) Oral two times a day  levETIRAcetam 500 milliGRAM(s) Oral three times a day  metoprolol     tartrate 25 milliGRAM(s) Oral two times a day  pantoprazole  Injectable 40 milliGRAM(s) IV Push daily  sodium chloride 0.9%. 1000 milliLiter(s) (75 mL/Hr) IV Continuous <Continuous>    Allergies: No Known Allergies    Intolerances

## 2018-01-26 ENCOUNTER — TRANSCRIPTION ENCOUNTER (OUTPATIENT)
Age: 73
End: 2018-01-26

## 2018-01-26 LAB
ALBUMIN SERPL ELPH-MCNC: 3.2 G/DL — LOW (ref 3.3–5)
ALP SERPL-CCNC: 82 U/L — SIGNIFICANT CHANGE UP (ref 40–120)
ALT FLD-CCNC: 29 U/L — SIGNIFICANT CHANGE UP (ref 12–78)
ANION GAP SERPL CALC-SCNC: 10 MMOL/L — SIGNIFICANT CHANGE UP (ref 5–17)
AST SERPL-CCNC: 41 U/L — HIGH (ref 15–37)
BILIRUB SERPL-MCNC: 0.5 MG/DL — SIGNIFICANT CHANGE UP (ref 0.2–1.2)
BUN SERPL-MCNC: 12 MG/DL — SIGNIFICANT CHANGE UP (ref 7–23)
CALCIUM SERPL-MCNC: 8.3 MG/DL — LOW (ref 8.5–10.1)
CEA SERPL-MCNC: 3.2 NG/ML — SIGNIFICANT CHANGE UP (ref 0–3.8)
CHLORIDE SERPL-SCNC: 110 MMOL/L — HIGH (ref 96–108)
CO2 SERPL-SCNC: 24 MMOL/L — SIGNIFICANT CHANGE UP (ref 22–31)
CREAT SERPL-MCNC: 0.73 MG/DL — SIGNIFICANT CHANGE UP (ref 0.5–1.3)
GLUCOSE SERPL-MCNC: 103 MG/DL — HIGH (ref 70–99)
HCT VFR BLD CALC: 41.6 % — SIGNIFICANT CHANGE UP (ref 39–50)
HGB BLD-MCNC: 13.5 G/DL — SIGNIFICANT CHANGE UP (ref 13–17)
MCHC RBC-ENTMCNC: 28.7 PG — SIGNIFICANT CHANGE UP (ref 27–34)
MCHC RBC-ENTMCNC: 32.5 GM/DL — SIGNIFICANT CHANGE UP (ref 32–36)
MCV RBC AUTO: 88.2 FL — SIGNIFICANT CHANGE UP (ref 80–100)
PLATELET # BLD AUTO: 114 K/UL — LOW (ref 150–400)
POTASSIUM SERPL-MCNC: 3.6 MMOL/L — SIGNIFICANT CHANGE UP (ref 3.5–5.3)
POTASSIUM SERPL-SCNC: 3.6 MMOL/L — SIGNIFICANT CHANGE UP (ref 3.5–5.3)
PROT SERPL-MCNC: 6.1 G/DL — SIGNIFICANT CHANGE UP (ref 6–8.3)
RBC # BLD: 4.72 M/UL — SIGNIFICANT CHANGE UP (ref 4.2–5.8)
RBC # FLD: 12.5 % — SIGNIFICANT CHANGE UP (ref 10.3–14.5)
SODIUM SERPL-SCNC: 144 MMOL/L — SIGNIFICANT CHANGE UP (ref 135–145)
WBC # BLD: 23.4 K/UL — HIGH (ref 3.8–10.5)
WBC # FLD AUTO: 23.4 K/UL — HIGH (ref 3.8–10.5)

## 2018-01-26 PROCEDURE — 99233 SBSQ HOSP IP/OBS HIGH 50: CPT | Mod: GC

## 2018-01-26 PROCEDURE — 99232 SBSQ HOSP IP/OBS MODERATE 35: CPT

## 2018-01-26 RX ORDER — NEOMYCIN SULFATE 500 MG/1
500 TABLET ORAL THREE TIMES A DAY
Qty: 0 | Refills: 0 | Status: DISCONTINUED | OUTPATIENT
Start: 2018-01-28 | End: 2018-01-30

## 2018-01-26 RX ORDER — MULTIVIT WITH MIN/MFOLATE/K2 340-15/3 G
300 POWDER (GRAM) ORAL ONCE
Qty: 0 | Refills: 0 | Status: COMPLETED | OUTPATIENT
Start: 2018-01-28 | End: 2018-01-28

## 2018-01-26 RX ORDER — ERYTHROMYCIN ETHYLSUCCINATE 400 MG
500 TABLET ORAL THREE TIMES A DAY
Qty: 0 | Refills: 0 | Status: COMPLETED | OUTPATIENT
Start: 2018-01-28 | End: 2018-01-28

## 2018-01-26 RX ADMIN — Medication 25 MILLIGRAM(S): at 06:14

## 2018-01-26 RX ADMIN — ATORVASTATIN CALCIUM 40 MILLIGRAM(S): 80 TABLET, FILM COATED ORAL at 23:07

## 2018-01-26 RX ADMIN — Medication 25 MILLIGRAM(S): at 18:14

## 2018-01-26 RX ADMIN — LEVETIRACETAM 500 MILLIGRAM(S): 250 TABLET, FILM COATED ORAL at 23:07

## 2018-01-26 RX ADMIN — GABAPENTIN 100 MILLIGRAM(S): 400 CAPSULE ORAL at 06:14

## 2018-01-26 RX ADMIN — LEVETIRACETAM 500 MILLIGRAM(S): 250 TABLET, FILM COATED ORAL at 15:39

## 2018-01-26 RX ADMIN — Medication 1000 UNIT(S): at 12:31

## 2018-01-26 RX ADMIN — PANTOPRAZOLE SODIUM 40 MILLIGRAM(S): 20 TABLET, DELAYED RELEASE ORAL at 12:31

## 2018-01-26 RX ADMIN — GABAPENTIN 100 MILLIGRAM(S): 400 CAPSULE ORAL at 18:14

## 2018-01-26 RX ADMIN — LEVETIRACETAM 500 MILLIGRAM(S): 250 TABLET, FILM COATED ORAL at 06:14

## 2018-01-26 RX ADMIN — PREGABALIN 1000 MICROGRAM(S): 225 CAPSULE ORAL at 12:31

## 2018-01-26 NOTE — DISCHARGE NOTE ADULT - CARE PLAN
Principal Discharge DX:	Mass of colon  Secondary Diagnosis:	GI bleed  Secondary Diagnosis:	HCAP (healthcare-associated pneumonia)  Secondary Diagnosis:	CAD (coronary artery disease)  Secondary Diagnosis:	CLL (chronic lymphocytic leukemia)  Secondary Diagnosis:	HTN (hypertension)  Secondary Diagnosis:	Seizure syndrome Principal Discharge DX:	Mass of colon  Assessment and plan of treatment:	You should follow up with surgeon after stress test.  Secondary Diagnosis:	GI bleed  Assessment and plan of treatment:	You should monitor your bowel movements. You should follow up with the surgeon about removal of the sigmoid mass.  Secondary Diagnosis:	CAD (coronary artery disease)  Assessment and plan of treatment:	You should continue atorvastatin, aspirin and plavix.  Secondary Diagnosis:	CLL (chronic lymphocytic leukemia)  Assessment and plan of treatment:	You should follow up with Dr. Fox.  Secondary Diagnosis:	HTN (hypertension)  Assessment and plan of treatment:	You should continue metoprolol.  Secondary Diagnosis:	Seizure syndrome  Assessment and plan of treatment:	You should continue Keppra. Principal Discharge DX:	Mass of colon  Goal:	Resection of mass  Assessment and plan of treatment:	You should follow up with the surgeon within 1 week. You should see your Cardiologist for a stress test to optimize you for surgery.  Secondary Diagnosis:	GI bleed  Assessment and plan of treatment:	You should monitor your bowel movements. You should follow up with the surgeon about removal of the sigmoid mass.  Secondary Diagnosis:	CAD (coronary artery disease)  Assessment and plan of treatment:	You should continue atorvastatin 40mg daily and aspirin 81mg daily. You should hold Plavix until you discuss the plan for surgery with your cardiologist.  Secondary Diagnosis:	CLL (chronic lymphocytic leukemia)  Assessment and plan of treatment:	You should follow up with Dr. Fox.  Secondary Diagnosis:	HTN (hypertension)  Assessment and plan of treatment:	You should continue metoprolol 50mg daily.  Secondary Diagnosis:	Seizure syndrome  Assessment and plan of treatment:	You should continue Keppra 500mg three times a day and Gabapentin 100mg twice daily.

## 2018-01-26 NOTE — PROGRESS NOTE ADULT - SUBJECTIVE AND OBJECTIVE BOX
HPI:  71yo M with PMH of CVA (2010) with subsequent left sided hemiplegia and balance difficulties, CAD s/p stents x2 (2010), CML (diagnosed in 2008 but has never been treated per patient), seizure disorder (has had 1 seizure in the past) presents with one episode of dark red blood per rectum. Patient states that he went to urinate earlier today and noted dark red blood come out from his "backside." He did not have a bowel movement, it was only blood. He has never experienced anything like this in the past. Up until today he has been in his usual state of health. Denies any fevers, chills, HA, dizziness, CP, palp, SOB, cough, abd pain, N/V/D, dysuria, hematuria.    In ED, VSS, WBC 24.4 (patient states he has a chronically elevated WBC ranging from 20-25 secondary to leukemia), H/H 14.9/45.7, lactate 2.3, FOBT (+), UA (-). CT abdomen/pelvis showed No hydronephrosis or bowel obstruction, airspace disease at the lung bases, possibly atelectatic, evidence of centrilobular emphysema  at the lung bases. Received zosyn x1. (22 Jan 2018 19:12)      SUBJECTIVE:  Patient is a 72y old  Male who presents with a chief complaint of "I was bleeding" (26 Jan 2018 09:45)          OBJECTIVE:  Review Of Systems:  Constitutional: [ ] Fever [ ] Chills [ ] Fatigue [ ] Weight change   HEENT: [ ] Blurred vision [ ] Eye Pain [ ] Headache [ ] Runny nose [ ] Sore Throat   Respiratory: [ ] Cough [ ] Wheezing [ ] Shortness of breath  Cardiovascular: [ ] Chest Pain [ ] Palpitations [ ] CERRATO [ ] PND [ ] Orthopnea  Gastrointestinal: [ ] Abdominal Pain [ ] Diarrhea [ ] Constipation [ ] Hemorrhoids [ ] Nausea [ ] Vomiting  Genitourinary: [ ] Nocturia [ ] Dysuria [ ] Incontinence  Extremities: [ ] Swelling [ ] Joint Pain  Neurologic: [ ] Focal deficit [ ] Paresthesias [ ] Syncope  Lymphatic: [ ] Swelling [ ] Lymphadenopathy   Skin: [ ] Rash [ ] Ecchymoses [ ] Wounds [ ] Lesions  Psychiatry: [ ] Depression [ ] Suicidal/Homicidal Ideation [ ] Anxiety [ ] Sleep Disturbances  [x ] 10 point review of systems is otherwise negative except as mentioned above            [ ]Unable to obtain    Allergy:  Allergies    No Known Allergies    Intolerances        Medications:  MEDICATIONS  (STANDING):  atorvastatin 40 milliGRAM(s) Oral at bedtime  cholecalciferol 1000 Unit(s) Oral daily  cyanocobalamin 1000 MICROGram(s) Oral daily  gabapentin 100 milliGRAM(s) Oral two times a day  levETIRAcetam 500 milliGRAM(s) Oral three times a day  metoprolol     tartrate 25 milliGRAM(s) Oral two times a day  pantoprazole  Injectable 40 milliGRAM(s) IV Push daily    MEDICATIONS  (PRN):      PMH/PSH/FH/SH: [ ] Unchanged    Vitals:  T(C): 36.8 (01-26-18 @ 05:13), Max: 36.8 (01-25-18 @ 21:11)  HR: 65 (01-26-18 @ 05:13) (65 - 66)  BP: 154/71 (01-26-18 @ 05:13) (121/64 - 154/71)  BP(mean): --  RR: 17 (01-26-18 @ 05:13) (17 - 18)  SpO2: 95% (01-26-18 @ 05:13) (94% - 95%)  Wt(kg): --  Daily     Daily   I&O's Summary    25 Jan 2018 07:01  -  26 Jan 2018 07:00  --------------------------------------------------------  IN: 660 mL / OUT: 0 mL / NET: 660 mL        Labs:                        13.5   23.4  )-----------( 114      ( 26 Jan 2018 07:19 )             41.6     01-26    144  |  110<H>  |  12  ----------------------------<  103<H>  3.6   |  24  |  0.73    Ca    8.3<L>      26 Jan 2018 07:19  Phos  3.0     01-25  Mg     1.9     01-25    TPro  6.1  /  Alb  3.2<L>  /  TBili  0.5  /  DBili  x   /  AST  41<H>  /  ALT  29  /  AlkPhos  82  01-26                      ECG:  < from: 12 Lead ECG (01.22.18 @ 14:11) >  Ventricular Rate 61 BPM    Atrial Rate 61 BPM    P-R Interval 158 ms    QRS Duration 70 ms    Q-T Interval 432 ms    QTC Calculation(Bezet) 434 ms    P Axis 55 degrees    R Axis 11 degrees    T Axis 29 degrees    Diagnosis Line Normal sinus rhythm  Normal ECG      < end of copied text >      Echo:      Stress Testing:     Cath:    Imaging:  < from: CT Chest w/ IV Cont (01.24.18 @ 19:07) >  XAM:  CT CHEST IC                            PROCEDURE DATE:  01/24/2018          INTERPRETATION:  CLINICAL INFORMATION:  Recent sigmoid mass found    PROCEDURE:  Using multislice helical CT, thin sections were obtained from   the thoracic inlet through the lung bases. 100 cc of Omnipaque 350 IV   contrast were administered    COMPARISON: None available    FINDINGS:      There is bilateral hilar lymphadenopathy. There is shotty mediastinal   lymphadenopathy. There is coronary artery calcification. Representative   right hilar node measures up to 1.7 cm on axial image 54. There is no   pleural or pericardial effusion. There is atherosclerotic calcification   of aorta.    Centrilobular emphysema with extensive bullous change There is no   evidence of infiltrate, mass or nodule.    The osseous structures demonstrate degenerative changes of the spine.    The upper abdomen is remarkable for cholecystectomy clips and cyst in the   upper pole the right kidney..    IMPRESSION: Bullous emphysema.  Hilar adenopathy  No evidence of lung mass  Coronary artery calcification and atherosclerotic calcification of aorta.  Status post cholecystectomy  Right renal cyst      < end of copied text >      < from: CT Abdomen and Pelvis w/ Oral Cont and w/ IV Cont (01.22.18 @ 18:22) >  EXAM:  CT ABDOMEN AND PELVIS OC IC                            PROCEDURE DATE:  01/22/2018          INTERPRETATION:  Clinical information: Rectal bleeding    There are no prior studies present for comparison    Axial images obtained, coronal and sagittal images computer reformatted.   Intravenous and oral contrast material administered, 100 cc of Omnipaque   350 intravenously administered.    Airspace disease at the lung bases, possibly atelectatic. Evidence of   centrilobular emphysema  at the lung bases. Multiple small bulla evident.  Coronary artery calcifications visible.    Hepatic parenchyma homogeneous. Postcholecystectomy clips present. The   spleen is not enlarged. No adrenal lesions evident. The pancreas is   unremarkable. Kidneys show no obstructive changes. Hypodensities in the   right kidney have the appearance of cysts.     Ectasia, infrarenal abdominal aorta measuring 2.7 cm, functional lumen   surrounded by thrombus. Advise follow-up monitoring to evaluate for   development of aortic aneurysm.    No retroperitoneal lymphadenopathy. No ascites. No biliary ductal   dilatation. No retroperitoneal lymphadenopathy.    No bowel obstruction. No sign of pneumatosis. The large bowel is filled   with stool. Diverticulosis present, extensive in the sigmoid region. No   sign of diverticulitis. Urinary bladder is filled, no stones evident. The   prostate is mildly enlarged. No free pelvic fluid evident. Inguinal   regions intact. No evidence of appendicitis.  Disc degenerativedisease L5-S1. Grade 1 retrolisthesis L5 over S1.   Posterior spurring L4, L5.    Tiny umbilical hernia, fat-containing, present.    IMPRESSION: No hydronephrosis or bowel obstruction. See multiple   additional findings as described above. Follow-up recommendations.          < end of copied text >      Interpretation of Telemetry:  not on tele      Physical Exam:  Appearance: [ x] Normal  [ ] abnormal [ ] NAD   Eyes: [ ] PERRL [ ] EOMI  HENT: [ x] Normal [ ] Abnormal oral muscosa [ ]NC/AT  Cardiovascular: [ x] S1 [ x] S2 [ ]x RRR [ ] m/r/g [ ]edema [ ] JVP  Procedural Access Site: [ ]  hematoma [ ] tender to palpation [ ] 2+ pulse [ ] bruit [ ] Ecchymosis  Respiratory: [ x] Clear to auscultation bilaterally  Gastrointestinal: [ x] Soft [ ] tenderness[ ] distension [ ] BS  Musculoskeletal: [ ] clubbing [ ] joint deformity   Neurologic: [x ] Non-focal  Lymphatic: [ ] lymphadenopathy  Psychiatry: [x ] AAOx3  [ ] confused [ ] disoriented [ ] Mood & affect appropriate  Skin: [ ]  rashes [ ] ecchymoses [ ] cyanosis HPI:  71yo M with PMH of CVA (2010) with subsequent left sided hemiplegia and balance difficulties, CAD s/p stents x2 (2010), CML (diagnosed in 2008 but has never been treated per patient), seizure disorder (has had 1 seizure in the past) presents with one episode of dark red blood per rectum. Patient states that he went to urinate earlier today and noted dark red blood come out from his "backside." He did not have a bowel movement, it was only blood. He has never experienced anything like this in the past. Up until today he has been in his usual state of health. Denies any fevers, chills, HA, dizziness, CP, palp, SOB, cough, abd pain, N/V/D, dysuria, hematuria.    In ED, VSS, WBC 24.4 (patient states he has a chronically elevated WBC ranging from 20-25 secondary to leukemia), H/H 14.9/45.7, lactate 2.3, FOBT (+), UA (-). CT abdomen/pelvis showed No hydronephrosis or bowel obstruction, airspace disease at the lung bases, possibly atelectatic, evidence of centrilobular emphysema  at the lung bases. Received zosyn x1. (22 Jan 2018 19:12)      SUBJECTIVE:  Patient is a 72y old  Male who presents with a chief complaint of "I was bleeding" (26 Jan 2018 09:45)    On exam this morning w/o any complaints.  Patient was sitting in the chair, denies chest pain, dyspnea, dizziness, palpitations, N&V. denies any further bleeding. Had a brown BM this am.           OBJECTIVE:  Review Of Systems:  Constitutional: [ ] Fever [ ] Chills [ ] Fatigue [ ] Weight change   HEENT: [ ] Blurred vision [ ] Eye Pain [ ] Headache [ ] Runny nose [ ] Sore Throat   Respiratory: [ ] Cough [ ] Wheezing [ ] Shortness of breath  Cardiovascular: [ ] Chest Pain [ ] Palpitations [ ] CERRATO [ ] PND [ ] Orthopnea  Gastrointestinal: [ ] Abdominal Pain [ ] Diarrhea [ ] Constipation [ ] Hemorrhoids [ ] Nausea [ ] Vomiting  Genitourinary: [ ] Nocturia [ ] Dysuria [ ] Incontinence  Extremities: [ ] Swelling [ ] Joint Pain  Neurologic: [ ] Focal deficit [ ] Paresthesias [ ] Syncope  Lymphatic: [ ] Swelling [ ] Lymphadenopathy   Skin: [ ] Rash [ ] Ecchymoses [ ] Wounds [ ] Lesions  Psychiatry: [ ] Depression [ ] Suicidal/Homicidal Ideation [ ] Anxiety [ ] Sleep Disturbances  [x ] 10 point review of systems is otherwise negative except as mentioned above            [ ]Unable to obtain    Allergy:  Allergies    No Known Allergies    Intolerances        Medications:  MEDICATIONS  (STANDING):  atorvastatin 40 milliGRAM(s) Oral at bedtime  cholecalciferol 1000 Unit(s) Oral daily  cyanocobalamin 1000 MICROGram(s) Oral daily  gabapentin 100 milliGRAM(s) Oral two times a day  levETIRAcetam 500 milliGRAM(s) Oral three times a day  metoprolol     tartrate 25 milliGRAM(s) Oral two times a day  pantoprazole  Injectable 40 milliGRAM(s) IV Push daily    MEDICATIONS  (PRN):      PMH/PSH/FH/SH: [ ] Unchanged    Vitals:  T(C): 36.8 (01-26-18 @ 05:13), Max: 36.8 (01-25-18 @ 21:11)  HR: 65 (01-26-18 @ 05:13) (65 - 66)  BP: 154/71 (01-26-18 @ 05:13) (121/64 - 154/71)  BP(mean): --  RR: 17 (01-26-18 @ 05:13) (17 - 18)  SpO2: 95% (01-26-18 @ 05:13) (94% - 95%)  Wt(kg): --  Daily     Daily   I&O's Summary    25 Jan 2018 07:01  -  26 Jan 2018 07:00  --------------------------------------------------------  IN: 660 mL / OUT: 0 mL / NET: 660 mL        Labs:                        13.5   23.4  )-----------( 114      ( 26 Jan 2018 07:19 )             41.6     01-26    144  |  110<H>  |  12  ----------------------------<  103<H>  3.6   |  24  |  0.73    Ca    8.3<L>      26 Jan 2018 07:19  Phos  3.0     01-25  Mg     1.9     01-25    TPro  6.1  /  Alb  3.2<L>  /  TBili  0.5  /  DBili  x   /  AST  41<H>  /  ALT  29  /  AlkPhos  82  01-26                      ECG:  < from: 12 Lead ECG (01.22.18 @ 14:11) >  Ventricular Rate 61 BPM    Atrial Rate 61 BPM    P-R Interval 158 ms    QRS Duration 70 ms    Q-T Interval 432 ms    QTC Calculation(Bezet) 434 ms    P Axis 55 degrees    R Axis 11 degrees    T Axis 29 degrees    Diagnosis Line Normal sinus rhythm  Normal ECG      < end of copied text >      Echo:      Stress Testing:     Cath:    Imaging:  < from: CT Chest w/ IV Cont (01.24.18 @ 19:07) >  XAM:  CT CHEST IC                            PROCEDURE DATE:  01/24/2018          INTERPRETATION:  CLINICAL INFORMATION:  Recent sigmoid mass found    PROCEDURE:  Using multislice helical CT, thin sections were obtained from   the thoracic inlet through the lung bases. 100 cc of Omnipaque 350 IV   contrast were administered    COMPARISON: None available    FINDINGS:      There is bilateral hilar lymphadenopathy. There is shotty mediastinal   lymphadenopathy. There is coronary artery calcification. Representative   right hilar node measures up to 1.7 cm on axial image 54. There is no   pleural or pericardial effusion. There is atherosclerotic calcification   of aorta.    Centrilobular emphysema with extensive bullous change There is no   evidence of infiltrate, mass or nodule.    The osseous structures demonstrate degenerative changes of the spine.    The upper abdomen is remarkable for cholecystectomy clips and cyst in the   upper pole the right kidney..    IMPRESSION: Bullous emphysema.  Hilar adenopathy  No evidence of lung mass  Coronary artery calcification and atherosclerotic calcification of aorta.  Status post cholecystectomy  Right renal cyst      < end of copied text >      < from: CT Abdomen and Pelvis w/ Oral Cont and w/ IV Cont (01.22.18 @ 18:22) >  EXAM:  CT ABDOMEN AND PELVIS OC IC                            PROCEDURE DATE:  01/22/2018          INTERPRETATION:  Clinical information: Rectal bleeding    There are no prior studies present for comparison    Axial images obtained, coronal and sagittal images computer reformatted.   Intravenous and oral contrast material administered, 100 cc of Omnipaque   350 intravenously administered.    Airspace disease at the lung bases, possibly atelectatic. Evidence of   centrilobular emphysema  at the lung bases. Multiple small bulla evident.  Coronary artery calcifications visible.    Hepatic parenchyma homogeneous. Postcholecystectomy clips present. The   spleen is not enlarged. No adrenal lesions evident. The pancreas is   unremarkable. Kidneys show no obstructive changes. Hypodensities in the   right kidney have the appearance of cysts.     Ectasia, infrarenal abdominal aorta measuring 2.7 cm, functional lumen   surrounded by thrombus. Advise follow-up monitoring to evaluate for   development of aortic aneurysm.    No retroperitoneal lymphadenopathy. No ascites. No biliary ductal   dilatation. No retroperitoneal lymphadenopathy.    No bowel obstruction. No sign of pneumatosis. The large bowel is filled   with stool. Diverticulosis present, extensive in the sigmoid region. No   sign of diverticulitis. Urinary bladder is filled, no stones evident. The   prostate is mildly enlarged. No free pelvic fluid evident. Inguinal   regions intact. No evidence of appendicitis.  Disc degenerativedisease L5-S1. Grade 1 retrolisthesis L5 over S1.   Posterior spurring L4, L5.    Tiny umbilical hernia, fat-containing, present.    IMPRESSION: No hydronephrosis or bowel obstruction. See multiple   additional findings as described above. Follow-up recommendations.          < end of copied text >      Interpretation of Telemetry:  not on tele      Physical Exam:  Appearance: [ x] Normal  [ ] abnormal [ ] NAD   Eyes: [ ] PERRL [ ] EOMI  HENT: [ x] Normal [ ] Abnormal oral muscosa [ ]NC/AT  Cardiovascular: [ x] S1 [ x] S2 [ ]x RRR [ ] m/r/g [ ]edema [ ] JVP  Procedural Access Site: [ ]  hematoma [ ] tender to palpation [ ] 2+ pulse [ ] bruit [ ] Ecchymosis  Respiratory: [ x] Clear to auscultation bilaterally  Gastrointestinal: [ x] Soft [ ] tenderness[ ] distension [ ] BS  Musculoskeletal: [ ] clubbing [ ] joint deformity (X) left arm sling in place   Neurologic: [x ] Non-focal  Lymphatic: [ ] lymphadenopathy  Psychiatry: [x ] AAOx3  [ ] confused [ ] disoriented [ ] Mood & affect appropriate  Skin: [ ]  rashes [ ] ecchymoses [ ] cyanosis

## 2018-01-26 NOTE — PROGRESS NOTE ADULT - ASSESSMENT
71yo M with PMH of CVA (2010) with subsequent left sided hemiplegia and balance difficulties, CAD s/p stents x2 (2010), CML (diagnosed in 2008 but has never been treated per patient), seizure disorder (has had 1 seizure in the past) presents with one episode of dark red blood per rectum. Patient states that he went to urinate earlier today and noted dark red blood come out from his "backside." He did not have a bowel movement, it was only blood. He has never experienced anything like this in the past. Up until today he has been in his usual state of health. Denies any fevers, chills, HA, dizziness, CP, palp, SOB, cough, abd pain, N/V/D, dysuria, hematuria.  - Evaluation shows a large mass in the sigmoid colon for which she is scheduled for possible followup biopsy and surgery with colectomy. This Visit with no further bleeding.  - he has had no symptoms of chest pain, dyspnea, or palpitations. Given his stability with no evidence for angina or heart failure his usual activities, he is in optimal cardiovascular condition for any planned surgery and no further cardiac evaluation/risk stratification as needed.        -for lap sigmoidectomy 1/29 as per Dr Halie Rhodes to Proceed with planned surgery from cardiology stand point, using routine hemodynamic monitoring  -Continue beta blocker therapy with metop 25 mg bid  -Continue statin therapy for now  - Cont to hold antiplatelets- as per neuro        Bee Campbell NP  (CARDIOLOGY)

## 2018-01-26 NOTE — PROGRESS NOTE ADULT - PROBLEM SELECTOR PLAN 3
ruled out  CT showed possible airspace disease at lung bases, given that patient lives at assisted living facility will treat for possible HCAP, antibiotics d/c'd as procal was negative  -off vanc and zosyn  -blood cultures 1/23 negative x2  - legionella and strep pna negative  -lactate repeat now wnl

## 2018-01-26 NOTE — DISCHARGE NOTE ADULT - MEDICATION SUMMARY - MEDICATIONS TO STOP TAKING
I will STOP taking the medications listed below when I get home from the hospital:    Plavix 75 mg oral tablet  -- 1 tab(s) by mouth once a day    Tamiflu 30 mg oral capsule  -- 1 cap(s) by mouth once a day

## 2018-01-26 NOTE — PROGRESS NOTE ADULT - PROBLEM SELECTOR PLAN 9
RVP negative, no need at this time for flu ppx  VTE ppx: SCDs, no pharmacologic VTE ppx in setting of possible GI bleed    IMPROVE VTE Individual Risk Assessment          RISK                                                          Points  [  ] Previous VTE                                                3  [  ] Thrombophilia                                             2  [ 2 ] Lower limb paralysis                                   2        (unable to hold up >15 seconds)    [ 2 ] Current Cancer                                             2         (within 6 months)  [  ] Immobilization > 24 hrs                              1  [  ] ICU/CCU stay > 24 hours                             1  [ 1 ] Age > 60                                                         1    IMPROVE VTE Score: 5

## 2018-01-26 NOTE — PROGRESS NOTE ADULT - SUBJECTIVE AND OBJECTIVE BOX
Interval Events: No new overnight event.  No N/V/D.  Tolerating diet.    HPI:71yo M with PMH of CVA (2010) with subsequent left sided hemiplegia and balance difficulties, CAD s/p stents x2 (2010), CML (diagnosed in 2008 but has never been treated per patient), seizure disorder (has had 1 seizure in the past) presents with one episode of dark red blood per rectum. Patient states that he went to urinate earlier today and noted dark red blood come out from his "backside." He did not have a bowel movement, it was only blood. He has never experienced anything like this in the past. Up until today he has been in his usual state of health. Denies any fevers, chills, HA, dizziness, CP, palp, SOB, cough, abd pain, N/V/D, dysuria, hematuria.      MEDICATIONS  (STANDING):  atorvastatin 40 milliGRAM(s) Oral at bedtime  cholecalciferol 1000 Unit(s) Oral daily  cyanocobalamin 1000 MICROGram(s) Oral daily  gabapentin 100 milliGRAM(s) Oral two times a day  levETIRAcetam 500 milliGRAM(s) Oral three times a day  metoprolol     tartrate 25 milliGRAM(s) Oral two times a day  pantoprazole  Injectable 40 milliGRAM(s) IV Push daily    MEDICATIONS  (PRN):      Allergies    No Known Allergies    Intolerances        Review of Systems:    General:  No wt loss, fevers, chills, night sweats,fatigue,   Eyes:  Good vision, no reported pain  ENT:  No sore throat, pain, runny nose, dysphagia  CV:  No pain, palpitations, hypo/hypertension  Resp:  No dyspnea, cough, tachypnea, wheezing  GI:  No pain, No nausea, No vomiting, No diarrhea, No constipation, No weight loss, No fever, No pruritis, No rectal bleeding, No melena, No dysphagia  :  No pain, bleeding, incontinence, nocturia  Muscle:  No pain, weakness  Neuro:  No weakness, tingling, memory problems  Psych:  No fatigue, insomnia, mood problems, depression  Endocrine:  No polyuria, polydypsia, cold/heat intolerance  Heme:  No petechiae, ecchymosis, easy bruisability  Skin:  No rash, tattoos, scars, edema      Vital Signs Last 24 Hrs  T(C): 36.8 (26 Jan 2018 05:13), Max: 36.8 (25 Jan 2018 21:11)  T(F): 98.3 (26 Jan 2018 05:13), Max: 98.3 (26 Jan 2018 05:13)  HR: 65 (26 Jan 2018 05:13) (65 - 66)  BP: 154/71 (26 Jan 2018 05:13) (121/64 - 154/71)  BP(mean): --  RR: 17 (26 Jan 2018 05:13) (17 - 18)  SpO2: 95% (26 Jan 2018 05:13) (94% - 95%)    PHYSICAL EXAM:    Constitutional: NAD, well-developed  HEENT: EOMI, throat clear  Neck: No LAD, supple  Respiratory: CTA and P  Cardiovascular: S1 and S2, RRR, no M  Gastrointestinal: BS+, soft, NT/ND, neg HSM,  Extremities: No peripheral edema, neg clubing, cyanosis  Vascular: 2+ peripheral pulses  Neurological: A/O x 3, no focal deficits  Psychiatric: Normal mood, normal affect  Skin: No rashes      LABS:                        13.5   23.4  )-----------( 114      ( 26 Jan 2018 07:19 )             41.6     01-26    144  |  110<H>  |  12  ----------------------------<  103<H>  3.6   |  24  |  0.73    Ca    8.3<L>      26 Jan 2018 07:19  Phos  3.0     01-25  Mg     1.9     01-25    TPro  6.1  /  Alb  3.2<L>  /  TBili  0.5  /  DBili  x   /  AST  41<H>  /  ALT  29  /  AlkPhos  82  01-26          RADIOLOGY & ADDITIONAL TESTS:

## 2018-01-26 NOTE — DISCHARGE NOTE ADULT - MEDICATION SUMMARY - MEDICATIONS TO TAKE
I will START or STAY ON the medications listed below when I get home from the hospital:    Aspir 81  --  by mouth   -- Indication: For CAD (coronary artery disease)    levETIRAcetam 500 mg oral tablet  -- 1 tab(s) by mouth 3 times a day  -- Indication: For Seizure syndrome    gabapentin 100 mg oral capsule  -- 1 cap(s) by mouth 2 times a day  -- Indication: For Seizure syndrome    atorvastatin 40 mg oral tablet  -- 1 tab(s) by mouth once a day  -- Indication: For HLD (hyperlipidemia)    metoprolol tartrate 25 mg oral tablet  -- 1 tab(s) by mouth 2 times a day  -- Indication: For HTN (hypertension)    mometasone 0.1% topical cream  -- Apply on skin to affected area 3 times a day right scapula  -- Indication: For Dry skin    Sarna 0.5%-0.5% topical lotion  -- Apply on skin to affected area 3 times a day, As Needed dry skin  -- Indication: For Dry skin    Vitamin B12 1000 mcg oral tablet  -- 1 tab(s) by mouth once a day  -- Indication: For Nutritional supplement    Vitamin D3 1000 intl units oral capsule  -- 1 cap(s) by mouth once a day  -- Indication: For Nutritional supplement I will START or STAY ON the medications listed below when I get home from the hospital:    Aspir 81  --  by mouth   -- Indication: For CAD (coronary artery disease)    levETIRAcetam 500 mg oral tablet  -- 1 tab(s) by mouth 3 times a day  -- Indication: For Seizure syndrome    gabapentin 100 mg oral capsule  -- 1 cap(s) by mouth 2 times a day  -- Indication: For Seizure syndrome    atorvastatin 40 mg oral tablet  -- 1 tab(s) by mouth once a day  -- Indication: For HLD (hyperlipidemia)    metoprolol tartrate 50 mg oral tablet  -- 1 tab(s) by mouth 2 times a day  -- Indication: For CAD (coronary artery disease)    mometasone 0.1% topical cream  -- Apply on skin to affected area 3 times a day right scapula  -- Indication: For Dry skin    Sarna 0.5%-0.5% topical lotion  -- Apply on skin to affected area 3 times a day, As Needed dry skin  -- Indication: For Dry skin    pantoprazole 40 mg oral delayed release tablet  -- 1 tab(s) by mouth once a day (before a meal)  -- Indication: For GI bleed    Vitamin B12 1000 mcg oral tablet  -- 1 tab(s) by mouth once a day  -- Indication: For Nutritional supplement    Vitamin D3 1000 intl units oral capsule  -- 1 cap(s) by mouth once a day  -- Indication: For Nutritional supplement

## 2018-01-26 NOTE — PROGRESS NOTE ADULT - PROBLEM SELECTOR PLAN 1
s/p colonoscopy with large sigmoid mass--f/u biopsy  ppi once a day  oncology on case  surgery on case-- plan for lap sigmoidectomy 1/29

## 2018-01-26 NOTE — DISCHARGE NOTE ADULT - SECONDARY DIAGNOSIS.
GI bleed HCAP (healthcare-associated pneumonia) CAD (coronary artery disease) CLL (chronic lymphocytic leukemia) HTN (hypertension) Seizure syndrome

## 2018-01-26 NOTE — PROGRESS NOTE ADULT - ATTENDING COMMENTS
71yo M with PMH of CVA (2010) with subsequent left sided hemiplegia and balance difficulties, CAD s/p stents x2 (2010), CLL (diagnosed in 2008 but has never been treated per patient), seizure disorder (has had 1 seizure in the past) presents with one episode of dark red blood per rectum admitted for lower GI bleed with finding of sigmoid mass. Plan: follow up biopsy results, plan for OR monday, apprec gen surg. gi and cardio recs, monitor course

## 2018-01-26 NOTE — PROGRESS NOTE ADULT - SUBJECTIVE AND OBJECTIVE BOX
Interval History:    Patient s/p colonoscopy noted submucosal mass.  path is pending. no melena nor bleeding. BM small brown formed stools. Cardiology evaluating pt also today.     Chart reviewed and events noted;   Overnight events:    MEDICATIONS  (STANDING):  atorvastatin 40 milliGRAM(s) Oral at bedtime  cholecalciferol 1000 Unit(s) Oral daily  cyanocobalamin 1000 MICROGram(s) Oral daily  gabapentin 100 milliGRAM(s) Oral two times a day  levETIRAcetam 500 milliGRAM(s) Oral three times a day  metoprolol     tartrate 25 milliGRAM(s) Oral two times a day  pantoprazole  Injectable 40 milliGRAM(s) IV Push daily    MEDICATIONS  (PRN):    Vital Signs Last 24 Hrs  T(C): 36.8 (26 Jan 2018 05:13), Max: 36.8 (25 Jan 2018 21:11)  T(F): 98.3 (26 Jan 2018 05:13), Max: 98.3 (26 Jan 2018 05:13)  HR: 65 (26 Jan 2018 05:13) (65 - 66)  BP: 154/71 (26 Jan 2018 05:13) (121/64 - 154/71)  BP(mean): --  RR: 17 (26 Jan 2018 05:13) (17 - 18)  SpO2: 95% (26 Jan 2018 05:13) (94% - 95%)    PHYSICAL EXAM  General: adult in NAD  HEENT: clear oropharynx, anicteric sclera, pink conjunctivae  Neck: supple  CV: normal S1S2 with no murmur rubs or gallops  Lungs: clear to auscultation, no wheezes, no rales  Abdomen: soft non-tender, mild distended, no hepatosplenomegaly  Ext: no clubbing cyanosis or edema  Skin: no rashes and no petechiae  Neuro: alert and oriented X3 no focal deficits      LABS:  CBC Full  -  ( 26 Jan 2018 07:19 )  WBC Count : 23.4 K/uL  Hemoglobin : 13.5 g/dL  Hematocrit : 41.6 %  Platelet Count - Automated : 114 K/uL  Mean Cell Volume : 88.2 fl  Mean Cell Hemoglobin : 28.7 pg  Mean Cell Hemoglobin Concentration : 32.5 gm/dL      CBC Full  -  ( 25 Jan 2018 07:56 )  WBC Count : 21.5 K/uL  Hemoglobin : 13.0 g/dL  Hematocrit : 38.4 %  Platelet Count - Automated : 118 K/uL  Mean Cell Volume : 88.8 fl  Mean Cell Hemoglobin : 30.0 pg  Mean Cell Hemoglobin Concentration : 33.7 gm/dL  Auto Neutrophil # : x  Auto Lymphocyte # : x  Auto Monocyte # : x  Auto Eosinophil # : x  Auto Basophil # : x  Auto Neutrophil % : 17.0 %  Auto Lymphocyte % : 70.0 %  Auto Monocyte % : 7.0 %  Auto Eosinophil % : 1.0 %  Auto Basophil % : x      01-25  142  |  111<H>  |  10  ----------------------------<  109<H>  3.7   |  23  |  0.77  Ca    8.5      25 Jan 2018 07:56  Phos  3.0     01-25  Mg     1.9     01-25 01-26  144  |  110<H>  |  12  ----------------------------<  103<H>  3.6   |  24  |  0.73  Ca    8.3<L>      26 Jan 2018 07:19  Phos  3.0     01-25  Mg     1.9     01-25  TPro  6.1  /  Alb  3.2<L>  /  TBili  0.5  /  DBili  x   /  AST  41<H>  /  ALT  29  /  AlkPhos  82  01-26      WBC trend  21.5 K/uL (01-25-18 @ 07:56)  23.4 K/uL (01-24-18 @ 08:00)  23.8 K/uL (01-23-18 @ 07:14)  24.4 K/uL (01-22-18 @ 14:17)      Hgb trend  13.0 g/dL (01-25-18 @ 07:56)  13.3 g/dL (01-24-18 @ 08:00)  13.9 g/dL (01-23-18 @ 07:14)  14.9 g/dL (01-22-18 @ 14:17)      plt trend  118 K/uL (01-25-18 @ 07:56)  125 K/uL (01-24-18 @ 08:00)  130 K/uL (01-23-18 @ 07:14)  140 K/uL (01-22-18 @ 14:17)        RADIOLOGY & ADDITIONAL STUDIES:    < from: CT Abdomen and Pelvis w/ Oral Cont and w/ IV Cont (01.22.18 @ 18:22) >  EXAM:  CT ABDOMEN AND PELVIS OC IC                        PROCEDURE DATE:  01/22/2018    INTERPRETATION:  Clinical information: Rectal bleeding  There are no prior studies present for comparison  Axial images obtained, coronal and sagittal images computer reformatted.   Intravenous and oral contrast material administered, 100 cc of Omnipaque 350 intravenously administered.    Airspace disease at the lung bases, possibly atelectatic. Evidence of centrilobular emphysema  at the lung bases. Multiple small bulla evident.  Coronary artery calcifications visible.    Hepatic parenchyma homogeneous. Postcholecystectomy clips present. The spleen is not enlarged. No adrenal lesions evident. The pancreas is   unremarkable. Kidneys show no obstructive changes. Hypodensities in the right kidney have the appearance of cysts.     Ectasia, infrarenal abdominal aorta measuring 2.7 cm, functional lumen surrounded by thrombus. Advise follow-up monitoring to evaluate for   development of aortic aneurysm.    No retroperitoneal lymphadenopathy. No ascites. No biliary ductal dilatation. No retroperitoneal lymphadenopathy.    No bowel obstruction. No sign of pneumatosis. The large bowel is filled with stool. Diverticulosis present, extensive in the sigmoid region. No   sign of diverticulitis. Urinary bladder is filled, no stones evident. The prostate is mildly enlarged. No free pelvic fluid evident. Inguinal regions intact. No evidence of appendicitis.  Disc degenerativedisease L5-S1. Grade 1 retrolisthesis L5 over S1.   Posterior spurring L4, L5.    Tiny umbilical hernia, fat-containing, present.    IMPRESSION: No hydronephrosis or bowel obstruction. See multiple additional findings as described above. Follow-up recommendations.  SAM LEAL M.D.,ATTENDING RADIOLOGIST  This document has been electronically signed. Jan 22 2018  6:21PM  < end of copied text >        < from: CT Chest w/ IV Cont (01.24.18 @ 19:07) >  EXAM:  CT CHEST IC                        PROCEDURE DATE:  01/24/2018    INTERPRETATION:  CLINICAL INFORMATION:  Recent sigmoid mass found  PROCEDURE:  Using multislice helical CT, thin sections were obtained from the thoracic inlet through the lung bases. 100 cc of Omnipaque 350 IV   contrast were administered   COMPARISON: None available    FINDINGS:    There is bilateral hilar lymphadenopathy. There is shotty mediastinal lymphadenopathy. There is coronary artery calcification. Representative right hilar node measures up to 1.7 cm on axial image 54. There is no pleural or pericardial effusion. There is atherosclerotic calcification of aorta.    Centrilobular emphysema with extensive bullous change There is no evidence of infiltrate, mass or nodule.    The osseous structures demonstrate degenerative changes of the spine.    The upper abdomen is remarkable for cholecystectomy clips and cyst in the upper pole the right kidney..    IMPRESSION: Bullous emphysema.  Hilar adenopathy  No evidence of lung mass  Coronary artery calcification and atherosclerotic calcification of aorta.  Status post cholecystectomy  Right renal cyst  WOLFGANG ROTHMAN M.D.,ATTENDING RADIOLOGIST  This documenthas been electronically signed. Jan 24 2018  7:24PM  < end of copied text >

## 2018-01-26 NOTE — PROGRESS NOTE ADULT - PROBLEM SELECTOR PLAN 5
will hold ASA 81mg and Plavix 75mg at this time in setting of possible GI bleed  -continue atorvastatin will hold ASA 81mg and Plavix 75mg at this time   -continue atorvastatin

## 2018-01-26 NOTE — DISCHARGE NOTE ADULT - PLAN OF CARE
You should follow up with surgeon after stress test. You should monitor your bowel movements. You should follow up with the surgeon about removal of the sigmoid mass. You should continue atorvastatin, aspirin and plavix. You should follow up with Dr. Fox. You should continue metoprolol. You should continue Keppra. Resection of mass You should follow up with the surgeon within 1 week. You should see your Cardiologist for a stress test to optimize you for surgery. You should continue atorvastatin 40mg daily and aspirin 81mg daily. You should hold Plavix until you discuss the plan for surgery with your cardiologist. You should continue metoprolol 50mg daily. You should continue Keppra 500mg three times a day and Gabapentin 100mg twice daily.

## 2018-01-26 NOTE — PROGRESS NOTE ADULT - ASSESSMENT
71yo M with PMH of CVA (2010) with subsequent left sided hemiplegia and balance difficulties, CAD s/p stents x2 (2010), CLL (diagnosed in 2008 but has never been treated per patient), seizure disorder (has had 1 seizure in the past) presents with one episode of dark red blood per rectum admitted for lower GI bleed with finding of sigmoid mass.

## 2018-01-26 NOTE — PROGRESS NOTE ADULT - SUBJECTIVE AND OBJECTIVE BOX
Patient is a 72y old  Male who presents with a chief complaint of "I was bleeding" (24 Jan 2018 09:14)    INTERVAL HPI: Pt seen and examined. No acute events overnight. Denies fevers, chills, n/v, chest pain, sob, abdominal pain, rectal bleeding.     REVIEW OF SYSTEMS: 12 systems noncontributory other than that stated in HPI    PHYSICAL EXAM:  GENERAL: NAD, elder  HEAD:  Atraumatic, Normocephalic  EYES: EOMI, PERRLA, conjunctiva and sclera clear  ENMT: No tonsillar erythema, exudates, or enlargement; Moist mucous membranes  NERVOUS SYSTEM:  Alert & Oriented X3, Good concentration; Motor Strength 1/5 LUE and LLE, 5/5 strength in RUE, RLL  CHEST/LUNG: Clear to percussion bilaterally; No rales, rhonchi, wheezing, or rubs  HEART: Regular rate and rhythm; No murmurs, rubs, or gallops  ABDOMEN: Soft, Nontender, Nondistended; Bowel sounds present, no guarding, no rebound  EXTREMITIES:  2+ Peripheral Pulses, No clubbing, cyanosis, or edema  SKIN: No rashes or lesions    LABS:                        13.0   21.5  )-----------( 118      ( 25 Jan 2018 07:56 )             38.4     25 Jan 2018 07:56    142    |  111    |  10     ----------------------------<  109    3.7     |  23     |  0.77     Ca    8.5        25 Jan 2018 07:56  Phos  3.0       25 Jan 2018 07:56  Mg     1.9       25 Jan 2018 07:56    Culture - Blood (01.23.18 @ 09:05)    Specimen Source: .Blood Blood    Culture Results: No growth to date.    Culture - Blood (01.23.18 @ 09:05)    Specimen Source: .Blood Blood    Culture Results: No growth to date.    MEDICATIONS  (STANDING):  atorvastatin 40 milliGRAM(s) Oral at bedtime  cholecalciferol 1000 Unit(s) Oral daily  cyanocobalamin 1000 MICROGram(s) Oral daily  gabapentin 100 milliGRAM(s) Oral two times a day  levETIRAcetam 500 milliGRAM(s) Oral three times a day  metoprolol     tartrate 25 milliGRAM(s) Oral two times a day  pantoprazole  Injectable 40 milliGRAM(s) IV Push daily  sodium chloride 0.9%. 1000 milliLiter(s) (75 mL/Hr) IV Continuous <Continuous>    Allergies: No Known Allergies    Intolerances Patient is a 72y old  Male who presents with a chief complaint of "I was bleeding" (24 Jan 2018 09:14)    INTERVAL HPI: Pt seen and examined. No acute events overnight. Patient had 2 formed nonbloody bowel movements since yesterday. Denies fevers, chills, n/v, chest pain, sob, abdominal pain, rectal bleeding.     REVIEW OF SYSTEMS: 12 systems noncontributory other than that stated in HPI    PHYSICAL EXAM:  GENERAL: NAD, elder  HEAD:  Atraumatic, Normocephalic  EYES: EOMI, PERRLA, conjunctiva and sclera clear  ENMT: No tonsillar erythema, exudates, or enlargement; Moist mucous membranes  NERVOUS SYSTEM:  Alert & Oriented X3, Good concentration; Motor Strength 1/5 LUE and LLE, 5/5 strength in RUE, RLL  CHEST/LUNG: Clear to percussion bilaterally; No rales, rhonchi, wheezing, or rubs  HEART: Regular rate and rhythm; No murmurs, rubs, or gallops  ABDOMEN: Soft, Nontender, Nondistended; Bowel sounds present, no guarding, no rebound  EXTREMITIES:  2+ Peripheral Pulses, No clubbing, cyanosis, or edema  SKIN: No rashes or lesions    LABS:                          13.5   23.4  )-----------( 114      ( 26 Jan 2018 07:19 )             41.6     26 Jan 2018 07:19    144    |  110    |  12     ----------------------------<  103    3.6     |  24     |  0.73     Ca    8.3        26 Jan 2018 07:19  Phos  3.0       25 Jan 2018 07:56  Mg     1.9       25 Jan 2018 07:56    TPro  6.1    /  Alb  3.2    /  TBili  0.5    /  DBili  x      /  AST  41     /  ALT  29     /  AlkPhos  82     26 Jan 2018 07:19    LIVER FUNCTIONS - ( 26 Jan 2018 07:19 )  Alb: 3.2 g/dL / Pro: 6.1 g/dL / ALK PHOS: 82 U/L / ALT: 29 U/L / AST: 41 U/L / GGT: x           Culture - Blood (01.23.18 @ 09:05)    Specimen Source: .Blood Blood    Culture Results: No growth to date.    Culture - Blood (01.23.18 @ 09:05)    Specimen Source: .Blood Blood    Culture Results: No growth to date.    MEDICATIONS  (STANDING):  atorvastatin 40 milliGRAM(s) Oral at bedtime  cholecalciferol 1000 Unit(s) Oral daily  cyanocobalamin 1000 MICROGram(s) Oral daily  gabapentin 100 milliGRAM(s) Oral two times a day  levETIRAcetam 500 milliGRAM(s) Oral three times a day  metoprolol     tartrate 25 milliGRAM(s) Oral two times a day  pantoprazole  Injectable 40 milliGRAM(s) IV Push daily    Allergies: No Known Allergies

## 2018-01-26 NOTE — PROGRESS NOTE ADULT - PROBLEM SELECTOR PLAN 1
Colonoscopy yesterday showed large mass in sigmoid colon.   -follow up biopsy  -f/u CEA level  -Oncology- Dr. Hsieh following  -Cardio-Lani. Cardiac clearance for possible colectomy. Spoke to daughter over phone with patient at bedside. Pt does not need stress test before surgery.  -multiple calls made to Dr. Aden. Dr. Garibay left 2 messages and is awaiting call back. Colonoscopy showed large sigmoid mass. Possible surgery on Monday.   -follow up biopsy  -CEA 3.2 (wnl)  -Oncology- Dr. Hsieh following  -Cardio-Lani. Cardiac clearance for possible colectomy. Spoke to daughter over phone with patient at bedside. Pt does not need stress test before surgery.  -multiple calls made to Dr. Aden. Dr. Garibay left 2 messages and is awaiting call back.

## 2018-01-26 NOTE — PROGRESS NOTE ADULT - PROBLEM SELECTOR PLAN 2
Pt reports no further rectal bleeding.   - continue to monitor bms  -ppi once a day Pt reports no further rectal bleeding.   - continue to monitor bms  - continue ppi QD

## 2018-01-26 NOTE — DISCHARGE NOTE ADULT - ADDITIONAL INSTRUCTIONS
Patient should follow up with surgeon within 1 week for surgery to remove sigmoid mass.   Patient should follow up with cardiologist regarding outpatient cardiac workup, including nuclear stress test. Patient should follow up with Dr. Mcdowell (surgery) within 1 week for surgery to remove sigmoid mass.   Patient should follow up with Dr. Aden (cardiologist) regarding outpatient cardiac workup, including nuclear stress test and restarting Plavix.   Patient should follow up with Dr. Fox (heme/onc) about CLL.

## 2018-01-26 NOTE — DISCHARGE NOTE ADULT - HOSPITAL COURSE
71yo M with PMH of CVA (2010) with subsequent left sided hemiplegia and balance difficulties, CAD s/p stents x2 (2010), CLL (diagnosed in 2008 but has never been treated per patient), seizure disorder (has had 1 seizure in the past) presents with one episode of dark red blood per rectum admitted for possible GI bleed, presumed HCAP. Patient started on Vanco and Zosyn for HCAP. Antibiotics were discontinued as HCAP unlikely due to negative procalcitonin and patient being afebrile with negative cultures. Strep pneumonia antigen and legionella antigen were negative. Patient had colonoscopy which showed a large sigmoid mass. Biopsy was taken and showed _________________.  CT of abdomen pelvis showed no liver metastasis. CEA level was 3.2 wnl. Patient scheduled to have colectomy to remove mass. Patient's aspirin and Plavix were held for procedure. Patient's home medications of Keppra, metoprolol and atorvastatin were continued. Patient was seen by physical therapy and recommended subacute rehab. Dr. Fox, cayden/onc was consulted for patient's history of CLL. Cayden/onc recommended serial followup as outpatient. 73yo M with PMH of CVA (2010) with subsequent left sided hemiplegia and balance difficulties, CAD s/p stents x2 (2010), CLL (diagnosed in 2008 but has never been treated per patient), seizure disorder (has had 1 seizure in the past) presents with one episode of dark red blood per rectum admitted for possible GI bleed, presumed HCAP. Patient started on Vanco and Zosyn for HCAP. Antibiotics were discontinued as HCAP unlikely due to negative procalcitonin and patient being afebrile with negative cultures. Strep pneumonia antigen and legionella antigen were negative. Patient had colonoscopy which showed a large sigmoid mass. Biopsy was taken and showed tubular adenoma with a focal minute focus of cytological atypia, suggestive of high grade dysplasia..  CT of abdomen pelvis showed no liver metastasis. CEA level was 3.2 wnl. Patient scheduled to have colectomy to remove mass. Patient's aspirin and Plavix were held for procedure. Patient's home medications of Keppra, metoprolol and atorvastatin were continued. Patient was seen by physical therapy and recommended subacute rehab. Dr. Fox, cayden/onc was consulted for patient's history of CLL. Cayden/onc recommended serial followup as outpatient. 71yo M with PMH of CVA (2010) with subsequent left sided hemiplegia and balance difficulties, CAD s/p stents x2 (2010), CLL (diagnosed in 2008 but has never been treated per patient), seizure disorder (has had 1 seizure in the past) presents with one episode of dark red blood per rectum admitted for possible GI bleed, presumed HCAP. Patient started on Vanco and Zosyn for HCAP. Antibiotics were discontinued as HCAP unlikely due to negative procalcitonin and patient being afebrile with negative cultures. Strep pneumonia antigen and legionella antigen were negative. Patient had colonoscopy which showed a large sigmoid mass. Biopsy was taken and showed tubular adenoma with a focal minute focus of cytological atypia, suggestive of high grade dysplasia. CT of abdomen pelvis showed no liver metastasis. CEA level was 3.2 wnl. Patient scheduled to have colectomy to remove mass. Patient's aspirin and Plavix were held for procedure. Patient's home medications of Keppra, metoprolol and atorvastatin were continued. Patient was seen by physical therapy and recommended subacute rehab. Dr. Fox, heme/onc was consulted for patient's history of CLL. Heme/onc recommended serial followup as outpatient. Patient was scheduled to have sigmoidectomy on 1/29. Patient was medically optimized and cleared by Cardiology for procedure. Surgery was postponed as patient's son requesting that patient have outpatient cardiac workup including nuclear stress test before procedure. Patient was discharged to skilled nursing facility to have outpatient cardiac workup. Patient instructed to follow up with surgery within 1 week. Patient's aspirin was restarted, Plavix held until seen by Cardiologist. Stable for d/c. 73yo M with PMH of CVA (2010) with subsequent left sided hemiplegia and balance difficulties, CAD s/p stents x2 (2010), CLL (diagnosed in 2008 but has never been treated per patient), seizure disorder (has had 1 seizure in the past) presents with one episode of dark red blood per rectum admitted for possible GI bleed, presumed HCAP. Patient started on Vanco and Zosyn for HCAP. Antibiotics were discontinued as HCAP unlikely due to negative procalcitonin and patient being afebrile with negative cultures. Strep pneumonia antigen and legionella antigen were negative. Patient had colonoscopy which showed a large sigmoid mass. Biopsy was taken and showed tubular adenoma with a focal minute focus of cytological atypia, suggestive of high grade dysplasia. CT of abdomen pelvis showed no liver metastasis. CEA level was 3.2 wnl. Patient scheduled to have colectomy to remove mass. Patient's aspirin and Plavix were held for procedure. Patient's home medications of Keppra, metoprolol and atorvastatin were continued. Patient was seen by physical therapy and recommended subacute rehab. Dr. Fox, heme/onc was consulted for patient's history of CLL. Heme/onc recommended serial followup as outpatient. Patient was scheduled to have sigmoidectomy on 1/29. Patient was medically optimized and cleared by Cardiology for procedure. Surgery was postponed as patient's son requesting that patient have outpatient cardiac workup including nuclear stress test before procedure. Patient was discharged to assisted living to have outpatient cardiac workup. Patient instructed to follow up with surgery within 1 week. Patient's aspirin was restarted, Plavix held until seen by Cardiologist. Stable for d/c.

## 2018-01-26 NOTE — PROGRESS NOTE ADULT - SUBJECTIVE AND OBJECTIVE BOX
Neurology follow up note    HECTOR PALADINPALADINO72yMale      Interval History:    Patient feels ok no new complaints.    MEDICATIONS    atorvastatin 40 milliGRAM(s) Oral at bedtime  cholecalciferol 1000 Unit(s) Oral daily  cyanocobalamin 1000 MICROGram(s) Oral daily  gabapentin 100 milliGRAM(s) Oral two times a day  levETIRAcetam 500 milliGRAM(s) Oral three times a day  metoprolol     tartrate 25 milliGRAM(s) Oral two times a day  pantoprazole  Injectable 40 milliGRAM(s) IV Push daily      Allergies    No Known Allergies    Intolerances            Vital Signs Last 24 Hrs  T(C): 36.8 (26 Jan 2018 05:13), Max: 36.8 (25 Jan 2018 21:11)  T(F): 98.3 (26 Jan 2018 05:13), Max: 98.3 (26 Jan 2018 05:13)  HR: 65 (26 Jan 2018 05:13) (65 - 66)  BP: 154/71 (26 Jan 2018 05:13) (121/64 - 154/71)  BP(mean): --  RR: 17 (26 Jan 2018 05:13) (17 - 18)  SpO2: 95% (26 Jan 2018 05:13) (94% - 95%)      REVIEW OF SYSTEMS:    Constitutional: No fever, chills, fatigue, weakness  Eyes: no eye pain, visual disturbances, or discharge  ENT:  No difficulty hearing, tinnitus, vertigo; No sinus or throat pain  Neck: No pain or stiffness  Respiratory: No cough, dyspnea, wheezing   Cardiovascular: No chest pain, palpitations,   Gastrointestinal: No abdominal or epigastric pain. No nausea, vomiting  No diarrhea or constipation.   Genitourinary: No dysuria, frequency, hematuria or incontinence  Neurological: No headaches, lightheadedness, vertigo, numbness or tremors  Psychiatric: No depression, anxiety, mood swings or difficulty sleeping  Musculoskeletal: No joint pain or swelling; No muscle, back or extremity pain  Skin: No itching, burning, rashes or lesions   Lymph Nodes: No enlarged glands  Endocrine: No heat or cold intolerance; No hair loss   Allergy and Immunologic: No hives or eczema    On Neurological Examination:    Mental Status -The patient is awake and alert.      Extraocular movements were intact.      Pupils equal, round, and reactive bilaterally, 3 mm to 2.      Speech was fluent.      Smile symmetric.      Motor, right upper, right lower was 4+/5, left upper arm was in a flexed position, had a sling.  There is subtle flexion extension at the elbow and slight movement of the thumb, first and second digits.  Left lower extremity, subtle movement at the hip and knee, as per patient this is his baseline motor status.      Sensory:  The patient has decreased light touch to left arm, left leg, as per the patient this is baseline sensory status.      Follow simple commands      GENERAL Exam: Nontoxic , No Acute Distress   	  HEENT:  normocephalic, atraumatic  		  LUNGS: Decreased bilaterally  	  HEART: Normal S1S2   No murmur RRR        	  GI/ ABDOMEN:  Soft  Non tender    EXTREMITIES:   No Edema  No Clubbing  No Cyanosis No Edema    MUSCULOSKELETAL:  decreased Range of Motion all 4 extremities   	   SKIN: Normal  No Ecchymosis               LABS:  CBC Full  -  ( 26 Jan 2018 07:19 )  WBC Count : 23.4 K/uL  Hemoglobin : 13.5 g/dL  Hematocrit : 41.6 %  Platelet Count - Automated : 114 K/uL  Mean Cell Volume : 88.2 fl  Mean Cell Hemoglobin : 28.7 pg  Mean Cell Hemoglobin Concentration : 32.5 gm/dL  Auto Neutrophil # : x  Auto Lymphocyte # : x  Auto Monocyte # : x  Auto Eosinophil # : x  Auto Basophil # : x  Auto Neutrophil % : x  Auto Lymphocyte % : x  Auto Monocyte % : x  Auto Eosinophil % : x  Auto Basophil % : x      01-26    144  |  110<H>  |  12  ----------------------------<  103<H>  3.6   |  24  |  0.73    Ca    8.3<L>      26 Jan 2018 07:19  Phos  3.0     01-25  Mg     1.9     01-25    TPro  6.1  /  Alb  3.2<L>  /  TBili  0.5  /  DBili  x   /  AST  41<H>  /  ALT  29  /  AlkPhos  82  01-26    Hemoglobin A1C:     LIVER FUNCTIONS - ( 26 Jan 2018 07:19 )  Alb: 3.2 g/dL / Pro: 6.1 g/dL / ALK PHOS: 82 U/L / ALT: 29 U/L / AST: 41 U/L / GGT: x           Vitamin B12         RADIOLOGY      ANALYSIS AND PLAN:  This is a 72-year-old with a history of cerebrovascular accident, epilepsy, and now with a sigmoid mass that needs surgical intervention.    1.	For history of cerebrovascular accident with a history of dark melena, the patient needs to undergo surgical intervention.  The patient's antiplatelets need to be held, risks versus benefits have to be weighed.  2.	For history of epilepsy, continue the patient on his Keppra.  3.	From Neurology standpoint, the patient is cleared for any type of surgical intervention, but please recommend do not cause any drops in blood pressure.  If possible, keep the blood pressure around 120 and try to avoid any type of bradycardic episodes.  4.	Continue to monitor the patient's H and H.    Thank you for the courtesy of consultation.    Physical therapy evaluation as tolerated  OOB to chair/ambulation with assistance only if possible.    Greater than 45 minutes spent in direct patient care reviewing  the notes, lab data/ imaging , discussion with multidisciplinary team.

## 2018-01-26 NOTE — PROGRESS NOTE ADULT - SUBJECTIVE AND OBJECTIVE BOX
pt seen  no complaints at this time  no further bleeding  ICU Vital Signs Last 24 Hrs  T(C): 36.8 (26 Jan 2018 05:13), Max: 36.8 (25 Jan 2018 21:11)  T(F): 98.3 (26 Jan 2018 05:13), Max: 98.3 (26 Jan 2018 05:13)  HR: 65 (26 Jan 2018 05:13) (65 - 66)  BP: 154/71 (26 Jan 2018 05:13) (121/64 - 154/71)  BP(mean): --  ABP: --  ABP(mean): --  RR: 17 (26 Jan 2018 05:13) (17 - 18)  SpO2: 95% (26 Jan 2018 05:13) (94% - 95%)  gen- NAD  resp- CTA b/l  CVS- reg rate and rhythm  abd-soft ND, mild LLQ tenderness  ext- no edema, nontender                          13.5   23.4  )-----------( 114      ( 26 Jan 2018 07:19 )             41.6

## 2018-01-26 NOTE — PROGRESS NOTE ADULT - ASSESSMENT
71 y/o man w hx CVA,HTN, HLD, presents to ER w 2 episode of blood per rectum. Notes on CBC with leukocytosis  WBC ~24k, Hgb normal, platelets 130-140k.  Pt reports dx'd "leukemia"while in [place] ~2010, followed by Oncologist there every few months, post bone marrow exam, never needing treatment and usual WBC ~25k.  Review of WBC differential show increased lymphocytes ~80% and Peripheral blood morphology w majority of WBC mature lymphocytes. Findings c/w Chronic Lymphocytic Leukemia(CLL), [Please note not CML(Chronic Myelogenous Leukemia) as documented in chart]    Pt has normal Hgb and preserved platelets, no palpable or symptomatic lymphadenopathy or organomegaly, no "B" symptoms. And according to him, present level of WBC similar to baseline.    Pt has stable CLL, no need for acute intervention, can continue serial f/u as outpatient.   Diagnosis unlikely to contribute to pt's bleeding symptoms, unless lymphoma.     GI evaluation with colonoscopy showing submucosal mass with pathology pending. CT scan without evidence of metastatic disease. to have surgical evaluation.     RECOMMEND:   Hgb stable. Follow CBC. Await pathology. DVT prophyalxis.

## 2018-01-26 NOTE — DISCHARGE NOTE ADULT - CARE PROVIDER_API CALL
Judy Ortiz), Neurology  [address] [place] [address]  Phone: [phone]  Fax: [phone] Judy Ortiz), Neurology  [address]  [address]  [place] [address]  Phone: [phone]  Fax: [phone]    Tawanda Mcdowell), Surgery  [address]  [place] [address]  Phone: [phone]  Fax: [phone]    Chung Yung (), Gastroenterology; Internal Medicine  [address] [place] [address]  Phone: [phone]  Fax: [phone] Judy Ortiz), Neurology  [address]  [address]  [place] [address]  Phone: [phone]  Fax: [phone]    Tawanda Mcdowell), Surgery  [address]  [place] [address]  Phone: [phone]  Fax: [phone]    Chung Yung (), Gastroenterology; Internal Medicine  [address] [place] [address]  Phone: [phone]  Fax: [phone]    Asa Aden), Cardiovascular Disease; Internal Medicine  [address] [place] [address]  Phone: [phone]  Fax: [phone]

## 2018-01-26 NOTE — DISCHARGE NOTE ADULT - PROVIDER TOKENS
TOKPIO:'[id number]' TOKEN:'[id number]',TOKEN:'[id number]',TOKEN:'[id number]' TOKEN:'[id number]',TOKEN:'[id number]',TOKEN:'[id number]',TOKEN:'[id number]'

## 2018-01-26 NOTE — DISCHARGE NOTE ADULT - PATIENT PORTAL LINK FT
“You can access the Follow[employer] Patient Portal, offered by [facility], by registering with the following website: [email or web address]”

## 2018-01-26 NOTE — DISCHARGE NOTE ADULT - CARE PROVIDERS DIRECT ADDRESSES
,DirectAddress_Unknown ,DirectAddress_Unknown,DirectAddress_Unknown,DirectAddress_Unknown ,DirectAddress_Unknown,DirectAddress_Unknown,DirectAddress_Unknown,DirectAddress_Unknown

## 2018-01-26 NOTE — PROGRESS NOTE ADULT - PROBLEM SELECTOR PLAN 8
chronic  baseline wbc 25k  appreciate heme/onc recs chronic  baseline wbc 25k  heme/onc- [facility] following

## 2018-01-27 DIAGNOSIS — I73.9 PERIPHERAL VASCULAR DISEASE, UNSPECIFIED: ICD-10-CM

## 2018-01-27 LAB
ANION GAP SERPL CALC-SCNC: 8 MMOL/L — SIGNIFICANT CHANGE UP (ref 5–17)
BLD GP AB SCN SERPL QL: SIGNIFICANT CHANGE UP
BUN SERPL-MCNC: 12 MG/DL — SIGNIFICANT CHANGE UP (ref 7–23)
CALCIUM SERPL-MCNC: 9.1 MG/DL — SIGNIFICANT CHANGE UP (ref 8.5–10.1)
CHLORIDE SERPL-SCNC: 109 MMOL/L — HIGH (ref 96–108)
CO2 SERPL-SCNC: 25 MMOL/L — SIGNIFICANT CHANGE UP (ref 22–31)
CREAT SERPL-MCNC: 0.63 MG/DL — SIGNIFICANT CHANGE UP (ref 0.5–1.3)
GLUCOSE SERPL-MCNC: 96 MG/DL — SIGNIFICANT CHANGE UP (ref 70–99)
HCT VFR BLD CALC: 42.6 % — SIGNIFICANT CHANGE UP (ref 39–50)
HGB BLD-MCNC: 14.1 G/DL — SIGNIFICANT CHANGE UP (ref 13–17)
MCHC RBC-ENTMCNC: 29.3 PG — SIGNIFICANT CHANGE UP (ref 27–34)
MCHC RBC-ENTMCNC: 33.1 GM/DL — SIGNIFICANT CHANGE UP (ref 32–36)
MCV RBC AUTO: 88.4 FL — SIGNIFICANT CHANGE UP (ref 80–100)
PLATELET # BLD AUTO: 121 K/UL — LOW (ref 150–400)
POTASSIUM SERPL-MCNC: 3.9 MMOL/L — SIGNIFICANT CHANGE UP (ref 3.5–5.3)
POTASSIUM SERPL-SCNC: 3.9 MMOL/L — SIGNIFICANT CHANGE UP (ref 3.5–5.3)
RBC # BLD: 4.82 M/UL — SIGNIFICANT CHANGE UP (ref 4.2–5.8)
RBC # FLD: 12.5 % — SIGNIFICANT CHANGE UP (ref 10.3–14.5)
SODIUM SERPL-SCNC: 142 MMOL/L — SIGNIFICANT CHANGE UP (ref 135–145)
WBC # BLD: 24.6 K/UL — HIGH (ref 3.8–10.5)
WBC # FLD AUTO: 24.6 K/UL — HIGH (ref 3.8–10.5)

## 2018-01-27 PROCEDURE — 99233 SBSQ HOSP IP/OBS HIGH 50: CPT

## 2018-01-27 PROCEDURE — 99232 SBSQ HOSP IP/OBS MODERATE 35: CPT

## 2018-01-27 PROCEDURE — 93306 TTE W/DOPPLER COMPLETE: CPT | Mod: 26

## 2018-01-27 RX ORDER — METOPROLOL TARTRATE 50 MG
50 TABLET ORAL
Qty: 0 | Refills: 0 | Status: DISCONTINUED | OUTPATIENT
Start: 2018-01-27 | End: 2018-01-30

## 2018-01-27 RX ADMIN — Medication 50 MILLIGRAM(S): at 17:14

## 2018-01-27 RX ADMIN — Medication 25 MILLIGRAM(S): at 06:29

## 2018-01-27 RX ADMIN — Medication 1000 UNIT(S): at 12:40

## 2018-01-27 RX ADMIN — PANTOPRAZOLE SODIUM 40 MILLIGRAM(S): 20 TABLET, DELAYED RELEASE ORAL at 12:41

## 2018-01-27 RX ADMIN — LEVETIRACETAM 500 MILLIGRAM(S): 250 TABLET, FILM COATED ORAL at 22:18

## 2018-01-27 RX ADMIN — LEVETIRACETAM 500 MILLIGRAM(S): 250 TABLET, FILM COATED ORAL at 13:37

## 2018-01-27 RX ADMIN — PREGABALIN 1000 MICROGRAM(S): 225 CAPSULE ORAL at 12:41

## 2018-01-27 RX ADMIN — GABAPENTIN 100 MILLIGRAM(S): 400 CAPSULE ORAL at 06:29

## 2018-01-27 RX ADMIN — GABAPENTIN 100 MILLIGRAM(S): 400 CAPSULE ORAL at 17:13

## 2018-01-27 RX ADMIN — LEVETIRACETAM 500 MILLIGRAM(S): 250 TABLET, FILM COATED ORAL at 06:29

## 2018-01-27 RX ADMIN — ATORVASTATIN CALCIUM 40 MILLIGRAM(S): 80 TABLET, FILM COATED ORAL at 22:18

## 2018-01-27 NOTE — PROGRESS NOTE ADULT - SUBJECTIVE AND OBJECTIVE BOX
Subjective: Pt without complaints.    Objective:  Vital Signs Last 24 Hrs  T(C): 36.9 (27 Jan 2018 05:25), Max: 36.9 (27 Jan 2018 05:25)  T(F): 98.5 (27 Jan 2018 05:25), Max: 98.5 (27 Jan 2018 05:25)  HR: 67 (27 Jan 2018 05:25) (67 - 80)  BP: 139/75 (27 Jan 2018 05:25) (110/64 - 139/75)  BP(mean): --  RR: 17 (27 Jan 2018 05:25) (16 - 17)  SpO2: 95% (27 Jan 2018 05:25) (93% - 95%)    Heent: STANLEYTRINH  Pul: clear  Cor: RSR, without murmurs  Abdomen: normal bowel sounds, without distension, or tenderness  Extremities: without edema, motor/sensory intact, without calf pain, Homans sign negative.                        14.1   24.6  )-----------( 121      ( 27 Jan 2018 08:15 )             42.6       01-27    142  |  109<H>  |  12  ----------------------------<  96  3.9   |  25  |  0.63    Ca    9.1      27 Jan 2018 08:15    TPro  6.1  /  Alb  3.2<L>  /  TBili  0.5  /  DBili  x   /  AST  41<H>  /  ALT  29  /  AlkPhos  82  01-26

## 2018-01-27 NOTE — PROGRESS NOTE ADULT - SUBJECTIVE AND OBJECTIVE BOX
Patient is a 72y old  Male who presents with a chief complaint of "I was bleeding" (24 Jan 2018 09:14) Colon mass found, pending colon resection with Surgery Dr. Mcdowell.     INTERVAL HPI: Pt seen and examined. No acute events overnight. Patient denies any chest pain, SOB, has on and off abdominal pain.    MEDICATIONS  (STANDING):  atorvastatin 40 milliGRAM(s) Oral at bedtime  cholecalciferol 1000 Unit(s) Oral daily  cyanocobalamin 1000 MICROGram(s) Oral daily  gabapentin 100 milliGRAM(s) Oral two times a day  levETIRAcetam 500 milliGRAM(s) Oral three times a day  metoprolol     tartrate 25 milliGRAM(s) Oral two times a day  pantoprazole  Injectable 40 milliGRAM(s) IV Push daily    MEDICATIONS  (PRN):    Vital Signs Last 24 Hrs  T(C): 36.7 (27 Jan 2018 13:38), Max: 36.9 (27 Jan 2018 05:25)  T(F): 98.1 (27 Jan 2018 13:38), Max: 98.5 (27 Jan 2018 05:25)  HR: 93 (27 Jan 2018 13:38) (67 - 93)  BP: 131/72 (27 Jan 2018 13:38) (126/82 - 139/75)  BP(mean): --  RR: 14 (27 Jan 2018 13:38) (14 - 17)  SpO2: 83% (27 Jan 2018 13:38) (83% - 95%)    PHYSICAL EXAM:  GENERAL: NAD, elder  HEAD:  Atraumatic, Normocephalic  EYES: EOMI, PERRLA, conjunctiva and sclera clear  ENMT: No tonsillar erythema, exudates, or enlargement; Moist mucous membranes  NERVOUS SYSTEM:  Alert & Oriented X3, Good concentration; Motor Strength 1/5 LUE and LLE, 5/5 strength in RUE, RLL  CHEST/LUNG: Clear to percussion bilaterally; No rales, rhonchi, wheezing, or rubs  HEART: Regular rate and rhythm; No murmurs, rubs, or gallops  ABDOMEN: Soft,+ LLQ tenderness on deep palpation, Nondistended; +BS  EXTREMITIES:  2+ Peripheral Pulses, No clubbing, cyanosis, or edema  SKIN: No rashes or lesions    LABS:                        14.1   24.6  )-----------( 121      ( 27 Jan 2018 08:15 )             42.6     01-27    142  |  109<H>  |  12  ----------------------------<  96  3.9   |  25  |  0.63    Ca    9.1      27 Jan 2018 08:15    TPro  6.1  /  Alb  3.2<L>  /  TBili  0.5  /  DBili  x   /  AST  41<H>  /  ALT  29  /  AlkPhos  82  01-26

## 2018-01-27 NOTE — PROGRESS NOTE ADULT - ASSESSMENT
71 y/o man w hx CVA,HTN, HLD, presents to ER w 2 episode of blood per rectum. Notes on CBC with leukocytosis  On admission WBC ~24k, Hgb normal, platelets 130-140k.  Pt reports dx'd "leukemia"while in [place] ~2010, followed by Oncologist there every few months, post bone marrow exam, never needing treatment and usual WBC ~25k. Lab and review of raciel blood smear during this hospitalization c/w Chronic Lymphocytic Leukemia. Stable , no acute intervention needed    now post colonoscopy w colon mass, pathology sig for adenoma and area of high grade dysplasia, scheduled for lap sigmoidectomy on Monday.    -stable from CLL standpoint for surgery  -await final pathology for further Onc evaluation  -discussed w son and patient.

## 2018-01-27 NOTE — PROGRESS NOTE ADULT - SUBJECTIVE AND OBJECTIVE BOX
Neurology follow up note    HECTOR PALADINPALADINO72yMale      Interval History:    Patient feels ok no new complaints. Seen with son     MEDICATIONS    atorvastatin 40 milliGRAM(s) Oral at bedtime  cholecalciferol 1000 Unit(s) Oral daily  cyanocobalamin 1000 MICROGram(s) Oral daily  gabapentin 100 milliGRAM(s) Oral two times a day  levETIRAcetam 500 milliGRAM(s) Oral three times a day  metoprolol     tartrate 25 milliGRAM(s) Oral two times a day  pantoprazole  Injectable 40 milliGRAM(s) IV Push daily      Allergies    No Known Allergies    Intolerances            Vital Signs Last 24 Hrs  T(C): 36.9 (27 Jan 2018 05:25), Max: 36.9 (27 Jan 2018 05:25)  T(F): 98.5 (27 Jan 2018 05:25), Max: 98.5 (27 Jan 2018 05:25)  HR: 67 (27 Jan 2018 05:25) (67 - 80)  BP: 139/75 (27 Jan 2018 05:25) (110/64 - 139/75)  BP(mean): --  RR: 17 (27 Jan 2018 05:25) (16 - 17)  SpO2: 95% (27 Jan 2018 05:25) (93% - 95%)             REVIEW OF SYSTEMS:    Constitutional: No fever, chills, fatigue, weakness  Eyes: no eye pain, visual disturbances, or discharge  ENT:  No difficulty hearing, tinnitus, vertigo; No sinus or throat pain  Neck: No pain or stiffness  Respiratory: No cough, dyspnea, wheezing   Cardiovascular: No chest pain, palpitations,   Gastrointestinal: No abdominal or epigastric pain. No nausea, vomiting  No diarrhea or constipation.   Genitourinary: No dysuria, frequency, hematuria or incontinence  Neurological: No headaches, lightheadedness, vertigo, numbness or tremors  Psychiatric: No depression, anxiety, mood swings or difficulty sleeping  Musculoskeletal: No joint pain or swelling; No muscle, back or extremity pain  Skin: No itching, burning, rashes or lesions   Lymph Nodes: No enlarged glands  Endocrine: No heat or cold intolerance; No hair loss   Allergy and Immunologic: No hives or eczema    On Neurological Examination:    Mental Status -The patient is awake and alert.      Extraocular movements were intact.      Pupils equal, round, and reactive bilaterally, 3 mm to 2.      Speech was fluent.      Smile symmetric.      Motor, right upper, right lower was 4+/5, left upper arm was in a flexed position, had a sling.  There is subtle flexion extension at the elbow and slight movement of the thumb, first and second digits.  Left lower extremity, subtle movement at the hip and knee, as per patient this is his baseline motor status.      Sensory:  The patient has decreased light touch to left arm, left leg, as per the patient this is baseline sensory status.      Follow simple commands      GENERAL Exam: Nontoxic , No Acute Distress   	  HEENT:  normocephalic, atraumatic  		  LUNGS: Decreased bilaterally  	  HEART: Normal S1S2   No murmur RRR        	  GI/ ABDOMEN:  Soft  Non tender    EXTREMITIES:   No Edema  No Clubbing  No Cyanosis No Edema    MUSCULOSKELETAL:  decreased Range of Motion all 4 extremities   	   SKIN: Normal  No Ecchymosis          LABS:  CBC Full  -  ( 27 Jan 2018 08:15 )  WBC Count : 24.6 K/uL  Hemoglobin : 14.1 g/dL  Hematocrit : 42.6 %  Platelet Count - Automated : 121 K/uL  Mean Cell Volume : 88.4 fl  Mean Cell Hemoglobin : 29.3 pg  Mean Cell Hemoglobin Concentration : 33.1 gm/dL  Auto Neutrophil # : x  Auto Lymphocyte # : x  Auto Monocyte # : x  Auto Eosinophil # : x  Auto Basophil # : x  Auto Neutrophil % : x  Auto Lymphocyte % : x  Auto Monocyte % : x  Auto Eosinophil % : x  Auto Basophil % : x      01-27    142  |  109<H>  |  12  ----------------------------<  96  3.9   |  25  |  0.63    Ca    9.1      27 Jan 2018 08:15    TPro  6.1  /  Alb  3.2<L>  /  TBili  0.5  /  DBili  x   /  AST  41<H>  /  ALT  29  /  AlkPhos  82  01-26    Hemoglobin A1C:     LIVER FUNCTIONS - ( 26 Jan 2018 07:19 )  Alb: 3.2 g/dL / Pro: 6.1 g/dL / ALK PHOS: 82 U/L / ALT: 29 U/L / AST: 41 U/L / GGT: x           Vitamin B12         RADIOLOGY      ANALYSIS AND PLAN:  This is a 72-year-old with a history of cerebrovascular accident, epilepsy, and now with a sigmoid mass that needs surgical intervention.    1.	For history of cerebrovascular accident with a history of dark melena, the patient needs to undergo surgical intervention.  The patient's antiplatelets need to be held, risks versus benefits have to be weighed.  2.	For history of epilepsy, continue the patient on his Keppra.  3.	From Neurology standpoint, the patient is cleared for any type of surgical intervention, but please recommend do not cause any drops in blood pressure.  If possible, keep the blood pressure around 120 and try to avoid any type of bradycardic episodes.  4.	Continue to monitor the patient's H and H.  5.	spoke to son in detail he understands overall issues and risks     Thank you for the courtesy of consultation.    Physical therapy evaluation as tolerated  OOB to chair/ambulation with assistance only if possible.    Greater than 40 minutes spent in direct patient care reviewing  the notes, lab data/ imaging , discussion with multidisciplinary team.

## 2018-01-27 NOTE — PROGRESS NOTE ADULT - SUBJECTIVE AND OBJECTIVE BOX
HPI:  73yo M with PMH of CVA (2010) with subsequent left sided hemiplegia and balance difficulties, CAD s/p stents x2 (2010), CML (diagnosed in 2008 but has never been treated per patient), seizure disorder (has had 1 seizure in the past) presents with one episode of dark red blood per rectum. Patient states that he went to urinate earlier today and noted dark red blood come out from his "backside." He did not have a bowel movement, it was only blood. He has never experienced anything like this in the past. Up until today he has been in his usual state of health. Denies any fevers, chills, HA, dizziness, CP, palp, SOB, cough, abd pain, N/V/D, dysuria, hematuria.    In ED, VSS, WBC 24.4 (patient states he has a chronically elevated WBC ranging from 20-25 secondary to leukemia), H/H 14.9/45.7, lactate 2.3, FOBT (+), UA (-). CT abdomen/pelvis showed No hydronephrosis or bowel obstruction, airspace disease at the lung bases, possibly atelectatic, evidence of centrilobular emphysema  at the lung bases. Received zosyn x1. (22 Jan 2018 19:12)      SUBJECTIVE:  Patient is a 72y old  Male who presents with a chief complaint of "I was bleeding" (26 Jan 2018 09:45)          OBJECTIVE:  Review Of Systems:  Constitutional: [ ] Fever [ ] Chills [ ] Fatigue [ ] Weight change   HEENT: [ ] Blurred vision [ ] Eye Pain [ ] Headache [ ] Runny nose [ ] Sore Throat   Respiratory: [ ] Cough [ ] Wheezing [ ] Shortness of breath  Cardiovascular: [ ] Chest Pain [ ] Palpitations [ ] CERRATO [ ] PND [ ] Orthopnea  Gastrointestinal: [ ] Abdominal Pain [ ] Diarrhea [ ] Constipation [ ] Hemorrhoids [ ] Nausea [ ] Vomiting  Genitourinary: [ ] Nocturia [ ] Dysuria [ ] Incontinence  Extremities: [ ] Swelling [ ] Joint Pain  Neurologic: [ ] Focal deficit [ ] Paresthesias [ ] Syncope  Lymphatic: [ ] Swelling [ ] Lymphadenopathy   Skin: [ ] Rash [ ] Ecchymoses [ ] Wounds [ ] Lesions  Psychiatry: [ ] Depression [ ] Suicidal/Homicidal Ideation [ ] Anxiety [ ] Sleep Disturbances  [ ] 10 point review of systems is otherwise negative except as mentioned above            [ ]Unable to obtain    Allergy:  Allergies    No Known Allergies    Intolerances        Medications:  MEDICATIONS  (STANDING):  atorvastatin 40 milliGRAM(s) Oral at bedtime  cholecalciferol 1000 Unit(s) Oral daily  cyanocobalamin 1000 MICROGram(s) Oral daily  gabapentin 100 milliGRAM(s) Oral two times a day  levETIRAcetam 500 milliGRAM(s) Oral three times a day  metoprolol     tartrate 25 milliGRAM(s) Oral two times a day  pantoprazole  Injectable 40 milliGRAM(s) IV Push daily    MEDICATIONS  (PRN):      PMH/PSH/FH/SH: [ ] Unchanged    Vitals:  T(C): 36.9 (01-27-18 @ 05:25), Max: 36.9 (01-27-18 @ 05:25)  HR: 67 (01-27-18 @ 05:25) (67 - 80)  BP: 139/75 (01-27-18 @ 05:25) (110/64 - 139/75)  BP(mean): --  RR: 17 (01-27-18 @ 05:25) (16 - 17)  SpO2: 95% (01-27-18 @ 05:25) (93% - 95%)  Wt(kg): --  Daily     Daily   I&O's Summary      Labs:                        14.1   24.6  )-----------( 121      ( 27 Jan 2018 08:15 )             42.6     01-27    142  |  109<H>  |  12  ----------------------------<  96  3.9   |  25  |  0.63    Ca    9.1      27 Jan 2018 08:15    TPro  6.1  /  Alb  3.2<L>  /  TBili  0.5  /  DBili  x   /  AST  41<H>  /  ALT  29  /  AlkPhos  82  01-26                      ECG:  < from: 12 Lead ECG (01.22.18 @ 14:11) >  Ventricular Rate 61 BPM    Atrial Rate 61 BPM    P-R Interval 158 ms    QRS Duration 70 ms    Q-T Interval 432 ms    QTC Calculation(Bezet) 434 ms    P Axis 55 degrees    R Axis 11 degrees    T Axis 29 degrees    Diagnosis Line Normal sinus rhythm  Normal ECG    Confirmed by Santosh Knox ([id number]) on 1/23/2018 11:41:11 AM    < end of copied text >    Echo:    Stress Testing:     Cath:    Imaging:  < from: CT Chest w/ IV Cont (01.24.18 @ 19:07) >  EXAM:  CT CHEST IC                            PROCEDURE DATE:  01/24/2018          INTERPRETATION:  CLINICAL INFORMATION:  Recent sigmoid mass found    PROCEDURE:  Using multislice helical CT, thin sections were obtained from   the thoracic inlet through the lung bases. 100 cc of Omnipaque 350 IV   contrast were administered    COMPARISON: None available    FINDINGS:      There is bilateral hilar lymphadenopathy. There is shotty mediastinal   lymphadenopathy. There is coronary artery calcification. Representative   right hilar node measures up to 1.7 cm on axial image 54. There is no   pleural or pericardial effusion. There is atherosclerotic calcification   of aorta.    Centrilobular emphysema with extensive bullous change There is no   evidence of infiltrate, mass or nodule.    The osseous structures demonstrate degenerative changes of the spine.    The upper abdomen is remarkable for cholecystectomy clips and cyst in the   upper pole the right kidney..    IMPRESSION: Bullous emphysema.  Hilar adenopathy  No evidence of lung mass  Coronary artery calcification and atherosclerotic calcification of aorta.  Status post cholecystectomy  Right renal cyst                            WOLFGANG ROTHMAN M.D.,ATTENDING RADIOLOGIST  This documenthas been electronically signed. Jan 24 2018  7:24PM    < end of copied text >  < from: Xray Wrist 3 Views, Left (05.11.17 @ 17:56) >  EXAM:  FOREARM LEFT                          EXAM:  WRIST COMPLETE LEFT (MIN 3 VIEW)                          EXAM:  ELBOW LEFT (3 VIEWS)                            PROCEDURE DATE:  05/11/2017            INTERPRETATION:  CLINICAL INFORMATION: Fall,left upper extremity pain.    EXAM: 3 views of the left elbow, 2 views of the left forearm, and 3 views   of the left wrist from 5/11/2017 at 1728.    COMPARISON: No similar prior studies available for comparison.    FINDINGS:  Limited study secondary to positioning.  No evidence of fracture or dislocation. Well-corticated ossicle along the   medial aspect of the coronoid process is favored to be sequelae of old   injury. Mild degenerative change of the basilar joint.  Unremarkable visualized soft tissues.    IMPRESSION:  No dislocation or displaced fracture of the left elbow, forearm, and   wrist.                 KWABENA LÓPEZ M.D., RADIOLOGY RESIDENT  This document has been electronically signed.  KETURAH VAZQUEZ M.D., ATTENDING RADIOLOGIST    < end of copied text >  < from: CT Abdomen and Pelvis w/ Oral Cont and w/ IV Cont (01.22.18 @ 18:22) >  EXAM:  CT ABDOMEN AND PELVIS OC IC                            PROCEDURE DATE:  01/22/2018          INTERPRETATION:  Clinical information: Rectal bleeding    There are no prior studies present for comparison    Axial images obtained, coronal and sagittal images computer reformatted.   Intravenous and oral contrast material administered, 100 cc of Omnipaque   350 intravenously administered.    Airspace disease at the lung bases, possibly atelectatic. Evidence of   centrilobular emphysema  at the lung bases. Multiple small bulla evident.  Coronary artery calcifications visible.    Hepatic parenchyma homogeneous. Postcholecystectomy clips present. The   spleen is not enlarged. No adrenal lesions evident. The pancreas is   unremarkable. Kidneys show no obstructive changes. Hypodensities in the   right kidney have the appearance of cysts.     Ectasia, infrarenal abdominal aorta measuring 2.7 cm, functional lumen   surrounded by thrombus. Advise follow-up monitoring to evaluate for   development of aortic aneurysm.    No retroperitoneal lymphadenopathy. No ascites. No biliary ductal   dilatation. No retroperitoneal lymphadenopathy.    No bowel obstruction. No sign of pneumatosis. The large bowel is filled   with stool. Diverticulosis present, extensive in the sigmoid region. No   sign of diverticulitis. Urinary bladder is filled, no stones evident. The   prostate is mildly enlarged. No free pelvic fluid evident. Inguinal   regions intact. No evidence of appendicitis.  Disc degenerativedisease L5-S1. Grade 1 retrolisthesis L5 over S1.   Posterior spurring L4, L5.    Tiny umbilical hernia, fat-containing, present.    IMPRESSION: No hydronephrosis or bowel obstruction. See multiple   additional findings as described above. Follow-up recommendations.                  SAM LEAL M.D.,ATTENDING RADIOLOGIST  This document has been electronically signed. Jan 22 2018  6:21PM    < end of copied text >    Interpretation of Telemetry:  Not on Tele    Physical Exam:  Appearance: [ ] Normal  [ ] abnormal [ ] NAD   Eyes: [ ] PERRL [ ] EOMI  HENT: [ ] Normal [ ] Abnormal oral muscosa [ ]NC/AT  Cardiovascular: [ ] S1 [ ] S2 [ ] RRR [ ] m/r/g [ ]edema [ ] JVP  Procedural Access Site: [ ]  hematoma [ ] tender to palpation [ ] 2+ pulse [ ] bruit [ ] Ecchymosis  Respiratory: [ ] Clear to auscultation bilaterally  Gastrointestinal: [ ] Soft [ ] tenderness[ ] distension [ ] BS  Musculoskeletal: [ ] clubbing [ ] joint deformity   Neurologic: [ ] Non-focal  Lymphatic: [ ] lymphadenopathy  Psychiatry: [ ] AAOx3  [ ] confused [ ] disoriented [ ] Mood & affect appropriate  Skin: [ ]  rashes [ ] ecchymoses [ ] cyanosis HPI:  73yo M with PMH of CVA (2010) with subsequent left sided hemiplegia and balance difficulties, CAD s/p stents x2 (2010), CML (diagnosed in 2008 but has never been treated per patient), seizure disorder (has had 1 seizure in the past) presents with one episode of dark red blood per rectum. Patient states that he went to urinate earlier today and noted dark red blood come out from his "backside." He did not have a bowel movement, it was only blood. He has never experienced anything like this in the past. Up until today he has been in his usual state of health. Denies any fevers, chills, HA, dizziness, CP, palp, SOB, cough, abd pain, N/V/D, dysuria, hematuria.    In ED, VSS, WBC 24.4 (patient states he has a chronically elevated WBC ranging from 20-25 secondary to leukemia), H/H 14.9/45.7, lactate 2.3, FOBT (+), UA (-). CT abdomen/pelvis showed No hydronephrosis or bowel obstruction, airspace disease at the lung bases, possibly atelectatic, evidence of centrilobular emphysema  at the lung bases. Received zosyn x1. (22 Jan 2018 19:12)      SUBJECTIVE:  Patient is a 72y old  Male who presents with a chief complaint of "I was bleeding" (26 Jan 2018 09:45)          OBJECTIVE:  Review Of Systems:  Constitutional: [ ] Fever [ ] Chills [ ] Fatigue [ ] Weight change   HEENT: [ ] Blurred vision [ ] Eye Pain [ ] Headache [ ] Runny nose [ ] Sore Throat   Respiratory: [ ] Cough [ ] Wheezing [ ] Shortness of breath  Cardiovascular: [ ] Chest Pain [ ] Palpitations [ ] CERRATO [ ] PND [ ] Orthopnea  Gastrointestinal: [ ] Abdominal Pain [ ] Diarrhea [ ] Constipation [ ] Hemorrhoids [ ] Nausea [ ] Vomiting  Genitourinary: [ ] Nocturia [ ] Dysuria [ ] Incontinence  Extremities: [ ] Swelling [ ] Joint Pain  Neurologic: [ ] Focal deficit [ ] Paresthesias [ ] Syncope  Lymphatic: [ ] Swelling [ ] Lymphadenopathy   Skin: [ ] Rash [ ] Ecchymoses [ ] Wounds [ ] Lesions  Psychiatry: [ ] Depression [ ] Suicidal/Homicidal Ideation [ ] Anxiety [ ] Sleep Disturbances  [x ] 10 point review of systems is otherwise negative except as mentioned above            [ ]Unable to obtain    Allergy:  Allergies    No Known Allergies    Intolerances        Medications:  MEDICATIONS  (STANDING):  atorvastatin 40 milliGRAM(s) Oral at bedtime  cholecalciferol 1000 Unit(s) Oral daily  cyanocobalamin 1000 MICROGram(s) Oral daily  gabapentin 100 milliGRAM(s) Oral two times a day  levETIRAcetam 500 milliGRAM(s) Oral three times a day  metoprolol     tartrate 25 milliGRAM(s) Oral two times a day  pantoprazole  Injectable 40 milliGRAM(s) IV Push daily    MEDICATIONS  (PRN):      PMH/PSH/FH/SH: [ ] Unchanged    Vitals:  T(C): 36.9 (01-27-18 @ 05:25), Max: 36.9 (01-27-18 @ 05:25)  HR: 67 (01-27-18 @ 05:25) (67 - 80)  BP: 139/75 (01-27-18 @ 05:25) (110/64 - 139/75)  BP(mean): --  RR: 17 (01-27-18 @ 05:25) (16 - 17)  SpO2: 95% (01-27-18 @ 05:25) (93% - 95%)  Wt(kg): --  Daily     Daily   I&O's Summary      Labs:                        14.1   24.6  )-----------( 121      ( 27 Jan 2018 08:15 )             42.6     01-27    142  |  109<H>  |  12  ----------------------------<  96  3.9   |  25  |  0.63    Ca    9.1      27 Jan 2018 08:15    TPro  6.1  /  Alb  3.2<L>  /  TBili  0.5  /  DBili  x   /  AST  41<H>  /  ALT  29  /  AlkPhos  82  01-26                      ECG:  < from: 12 Lead ECG (01.22.18 @ 14:11) >  Ventricular Rate 61 BPM    Atrial Rate 61 BPM    P-R Interval 158 ms    QRS Duration 70 ms    Q-T Interval 432 ms    QTC Calculation(Bezet) 434 ms    P Axis 55 degrees    R Axis 11 degrees    T Axis 29 degrees    Diagnosis Line Normal sinus rhythm  Normal ECG    Confirmed by Santosh Knox ([id number]) on 1/23/2018 11:41:11 AM    < end of copied text >    Echo:    Stress Testing:     Cath:    Imaging:  < from: CT Chest w/ IV Cont (01.24.18 @ 19:07) >  EXAM:  CT CHEST IC                            PROCEDURE DATE:  01/24/2018          INTERPRETATION:  CLINICAL INFORMATION:  Recent sigmoid mass found    PROCEDURE:  Using multislice helical CT, thin sections were obtained from   the thoracic inlet through the lung bases. 100 cc of Omnipaque 350 IV   contrast were administered    COMPARISON: None available    FINDINGS:      There is bilateral hilar lymphadenopathy. There is shotty mediastinal   lymphadenopathy. There is coronary artery calcification. Representative   right hilar node measures up to 1.7 cm on axial image 54. There is no   pleural or pericardial effusion. There is atherosclerotic calcification   of aorta.    Centrilobular emphysema with extensive bullous change There is no   evidence of infiltrate, mass or nodule.    The osseous structures demonstrate degenerative changes of the spine.    The upper abdomen is remarkable for cholecystectomy clips and cyst in the   upper pole the right kidney..    IMPRESSION: Bullous emphysema.  Hilar adenopathy  No evidence of lung mass  Coronary artery calcification and atherosclerotic calcification of aorta.  Status post cholecystectomy  Right renal cyst                            WOLFGANG ROTHMAN M.D.,ATTENDING RADIOLOGIST  This documenthas been electronically signed. Jan 24 2018  7:24PM    < end of copied text >  < from: Xray Wrist 3 Views, Left (05.11.17 @ 17:56) >  EXAM:  FOREARM LEFT                          EXAM:  WRIST COMPLETE LEFT (MIN 3 VIEW)                          EXAM:  ELBOW LEFT (3 VIEWS)                            PROCEDURE DATE:  05/11/2017            INTERPRETATION:  CLINICAL INFORMATION: Fall,left upper extremity pain.    EXAM: 3 views of the left elbow, 2 views of the left forearm, and 3 views   of the left wrist from 5/11/2017 at 1728.    COMPARISON: No similar prior studies available for comparison.    FINDINGS:  Limited study secondary to positioning.  No evidence of fracture or dislocation. Well-corticated ossicle along the   medial aspect of the coronoid process is favored to be sequelae of old   injury. Mild degenerative change of the basilar joint.  Unremarkable visualized soft tissues.    IMPRESSION:  No dislocation or displaced fracture of the left elbow, forearm, and   wrist.                 KWABENA LÓPEZ M.D., RADIOLOGY RESIDENT  This document has been electronically signed.  KETURAH VAZQUEZ M.D., ATTENDING RADIOLOGIST    < end of copied text >  < from: CT Abdomen and Pelvis w/ Oral Cont and w/ IV Cont (01.22.18 @ 18:22) >  EXAM:  CT ABDOMEN AND PELVIS OC IC                            PROCEDURE DATE:  01/22/2018          INTERPRETATION:  Clinical information: Rectal bleeding    There are no prior studies present for comparison    Axial images obtained, coronal and sagittal images computer reformatted.   Intravenous and oral contrast material administered, 100 cc of Omnipaque   350 intravenously administered.    Airspace disease at the lung bases, possibly atelectatic. Evidence of   centrilobular emphysema  at the lung bases. Multiple small bulla evident.  Coronary artery calcifications visible.    Hepatic parenchyma homogeneous. Postcholecystectomy clips present. The   spleen is not enlarged. No adrenal lesions evident. The pancreas is   unremarkable. Kidneys show no obstructive changes. Hypodensities in the   right kidney have the appearance of cysts.     Ectasia, infrarenal abdominal aorta measuring 2.7 cm, functional lumen   surrounded by thrombus. Advise follow-up monitoring to evaluate for   development of aortic aneurysm.    No retroperitoneal lymphadenopathy. No ascites. No biliary ductal   dilatation. No retroperitoneal lymphadenopathy.    No bowel obstruction. No sign of pneumatosis. The large bowel is filled   with stool. Diverticulosis present, extensive in the sigmoid region. No   sign of diverticulitis. Urinary bladder is filled, no stones evident. The   prostate is mildly enlarged. No free pelvic fluid evident. Inguinal   regions intact. No evidence of appendicitis.  Disc degenerativedisease L5-S1. Grade 1 retrolisthesis L5 over S1.   Posterior spurring L4, L5.    Tiny umbilical hernia, fat-containing, present.    IMPRESSION: No hydronephrosis or bowel obstruction. See multiple   additional findings as described above. Follow-up recommendations.                  SAM LEAL M.D.,ATTENDING RADIOLOGIST  This document has been electronically signed. Jan 22 2018  6:21PM    < end of copied text >    Interpretation of Telemetry:  Not on Tele    Physical Exam:  Appearance: [x ] Normal  [ ] abnormal [x ] NAD   Eyes: [x ] PERRL [ ] EOMI  HENT: [ x] Normal [ ] Abnormal oral muscosa [x ]NC/AT  Cardiovascular: [x ] S1 [x ] S2 x[ ] RRR [ ] m/r/g [ ]edema [ ] JVP  Procedural Access Site: [ ]  hematoma [ ] tender to palpation [ ] 2+ pulse [ ] bruit [ ] Ecchymosis  Respiratory: [x ] Clear to auscultation bilaterally with anterior left sided rhonchi that clears with cough  Gastrointestinal: [x ] Soft [ ] tenderness[ ] distension [x ] BS  Musculoskeletal: [ ] clubbing [ ] joint deformity [x] left arm contraction   Neurologic: [ ] Non-focal [x] left LE paresis with LUE paralysis  Lymphatic: [ ] lymphadenopathy [x] no peripheral edema  Psychiatry: [x ] AAOx3  [ ] confused [ ] disoriented [x ] Mood & affect appropriate  Skin: [ ]  rashes [ ] ecchymoses [ ] cyanosis

## 2018-01-27 NOTE — PROGRESS NOTE ADULT - PROBLEM SELECTOR PLAN 1
Colonoscopy showed large sigmoid mass. Possible surgery on Monday.   -follow up biopsy  -CEA 3.2 (wnl)  -Oncology- Dr. Hsieh following  -Cardio-Dr. Payne- [facility].   -Spoke to Dr. Aden [phone] and appears to be concerned about upcoming clearance for surgery. As per Dr. Aden patient has strong family history of premature cardiac death/ CAD s/p stent to RCA/circumflex, had a holter done recently showing NSVT.   -Discussed case with Dr. Payne to see if any further cardiac work up like a nuclear stress test would be indicated at this time  -Son also aware of plan and awaiting further recommendations from cardiology  -DIET ONLY LIQUID as per Surgery, DO NOT advance

## 2018-01-27 NOTE — PROGRESS NOTE ADULT - PROBLEM SELECTOR PLAN 9
s/p iliac artery stent  -patient was to see a vascular doctor as outpatient  -continue medical management for now if further cardiac work up necessary will consider vascular consult and get CTA

## 2018-01-27 NOTE — PROGRESS NOTE ADULT - ASSESSMENT
71yo M with PMH of CVA (2010) with subsequent left sided hemiplegia and balance difficulties, CAD s/p stents x2 (2010), CML (diagnosed in 2008 but has never been treated per patient), seizure disorder (has had 1 seizure in the past) presents with one episode of dark red blood per rectum. Patient states that he went to urinate earlier today and noted dark red blood come out from his "backside." He did not have a bowel movement, it was only blood. He has never experienced anything like this in the past. Up until today he has been in his usual state of health. Denies any fevers, chills, HA, dizziness, CP, palp, SOB, cough, abd pain, N/V/D, dysuria, hematuria.      - he has had no symptoms of chest pain, dyspnea, or palpitations. Given his stability with no evidence for angina or heart failure his usual activities, he is in optimal cardiovascular condition for any planned surgery and no further cardiac evaluation/risk stratification as needed.  - Okay to Proceed with planned surgery from cardiology stand point, using routine hemodynamic monitoring  - for lap sigmoidectomy for large mass in the sigmoid colon on 1/29 as per Dr Ambrose   - H&H stable 14/46  - Continue beta blocker therapy with metop 25 mg bid  - Continue statin therapy for now  - follow up on echo  - Cont to hold antiplatelets- as per neuro  - hemodynamics stable as per flow sheet  - Monitor electrolytes.  Maintain K >4 Mg >2  - other cardiac workup as indicated by clinical course  - will follow    Jia Cifuentes NP-C  Cardiology 73yo M with PMH of CVA (2010) with subsequent left sided hemiplegia and balance difficulties, CAD s/p stents x2 (2010), CML (diagnosed in 2008 but has never been treated per patient), seizure disorder (has had 1 seizure in the past) presents with one episode of dark red blood per rectum. Patient states that he went to urinate earlier today and noted dark red blood come out from his "backside." He did not have a bowel movement, it was only blood. He has never experienced anything like this in the past. Up until today he has been in his usual state of health. Denies any fevers, chills, HA, dizziness, CP, palp, SOB, cough, abd pain, N/V/D, dysuria, hematuria.      - s/p sigmoid bx 1/24 results pending.  - He has had no symptoms of chest pain, dyspnea, or palpitations. Given his stability with no evidence for angina or heart failure his usual activities, he is in optimal cardiovascular condition for any planned surgery and no further cardiac evaluation/risk stratification as needed.  - Okay to Proceed with planned surgery from cardiology stand point, using routine hemodynamic monitoring  - for lap sigmoidectomy for large mass in the sigmoid colon on 1/29 as per Dr Ambrose   - H&H stable 14/46 no further bleeding during hospitalization as per pt.    - Continue beta blocker therapy with metop 25 mg bid  - Continue statin therapy for now  - follow up on echo  - Cont to hold antiplatelets- as per neuro  - hemodynamics stable as per flow sheet  - Monitor electrolytes.  Maintain K >4 Mg >2  - other cardiac workup as indicated by clinical course  - will follow    Jia Cifuentes NP-C  Cardiology 71yo M with PMH of CVA (2010) with subsequent left sided hemiplegia and balance difficulties, CAD s/p stents x2 (2010), CML (diagnosed in 2008 but has never been treated per patient), seizure disorder (has had 1 seizure in the past) presents with one episode of dark red blood per rectum. Patient states that he went to urinate earlier today and noted dark red blood come out from his "backside." He did not have a bowel movement, it was only blood. He has never experienced anything like this in the past. Up until today he has been in his usual state of health. Denies any fevers, chills, HA, dizziness, CP, palp, SOB, cough, abd pain, N/V/D, dysuria, hematuria.      - s/p sigmoid bx 1/24 results pending.  - He has had no symptoms of chest pain, dyspnea, or palpitations. Given his stability with no evidence for angina or heart failure his usual activities, he is in optimal cardiovascular condition for any planned surgery and no further cardiac evaluation/risk stratification as needed.  - Okay to Proceed with planned surgery from cardiology stand point, using routine hemodynamic monitoring  - for lap sigmoidectomy for large mass in the sigmoid colon on 1/29 as per Dr Ambrose   - H&H stable 14/46 no further bleeding during hospitalization as per pt.    - Increase beta blocker therapy with metop to 50 mg bid  - Continue statin therapy for now  - follow up on echo  - Cont to hold antiplatelets   - hemodynamics stable as per flow sheet  - Monitor electrolytes.  Maintain K >4 Mg >2  - other cardiac workup as indicated by clinical course  - will follow    Jia Cifuentes NP-C  Cardiology

## 2018-01-27 NOTE — PROGRESS NOTE ADULT - ATTENDING COMMENTS
Pending cardiology final recommendations ( Dr. Payne and outpatient cardiologist Dr. Aden) will likely plan for surgery on Monday with Dr. Mcdowell.    Dr. Aden (cardiologist # [phone])

## 2018-01-27 NOTE — PROGRESS NOTE ADULT - ASSESSMENT
71yo M with PMH of CVA (2010) with subsequent left sided hemiplegia and balance difficulties, CAD s/p stents x2 (2010), CLL (diagnosed in 2008 but has never been treated per patient), seizure disorder (has had 1 seizure in the past), PVD s/p iliac artery stent, presents with one episode of dark red blood per rectum admitted for lower GI bleed with finding of sigmoid mass.

## 2018-01-27 NOTE — PROGRESS NOTE ADULT - SUBJECTIVE AND OBJECTIVE BOX
INTERVAL HPI/OVERNIGHT EVENTS:  No new overnight event.  No N/V/D.  Tolerating diet.     MEDICATIONS  (STANDING):  atorvastatin 40 milliGRAM(s) Oral at bedtime  cholecalciferol 1000 Unit(s) Oral daily  cyanocobalamin 1000 MICROGram(s) Oral daily  gabapentin 100 milliGRAM(s) Oral two times a day  levETIRAcetam 500 milliGRAM(s) Oral three times a day  metoprolol     tartrate 25 milliGRAM(s) Oral two times a day  pantoprazole  Injectable 40 milliGRAM(s) IV Push daily    MEDICATIONS  (PRN):      Allergies    No Known Allergies    Intolerances        Review of Systems:    General:  No wt loss, fevers, chills, night sweats,fatigue,   Eyes:  Good vision, no reported pain  ENT:  No sore throat, pain, runny nose, dysphagia  CV:  No pain, palpitatioins, hypo/hypertension  Resp:  No dyspnea, cough, tachypnea, wheezing  GI:  No pain, No nausea, No vomiting, No diarrhea, No constipatiion, No weight loss, No fever, No pruritis, No rectal bleeding, No tarry stools, No dysphagia,  :  No pain, bleeding, incontinence, nocturia  Muscle:  No pain, weakness  Neuro:  No weakness, tingling, memory problems  Psych:  No fatigue, insomnia, mood problems, depression  Endocrine:  No polyuria, polydypsia, cold/heat intolerance  Heme:  No petechiae, ecchymosis, easy bruisability  Skin:  No rash, tattoos, scars, edema      Vital Signs Last 24 Hrs  T(C): 36.9 (27 Jan 2018 05:25), Max: 36.9 (27 Jan 2018 05:25)  T(F): 98.5 (27 Jan 2018 05:25), Max: 98.5 (27 Jan 2018 05:25)  HR: 67 (27 Jan 2018 05:25) (67 - 80)  BP: 139/75 (27 Jan 2018 05:25) (110/64 - 139/75)  BP(mean): --  RR: 17 (27 Jan 2018 05:25) (16 - 17)  SpO2: 95% (27 Jan 2018 05:25) (93% - 95%)    PHYSICAL EXAM:    Constitutional: NAD, well-developed  HEENT: EOMI, throat clear  Neck: No LAD, supple  Respiratory: CTA and P  Cardiovascular: S1 and S2, RRR, no M  Gastrointestinal: BS+, soft, NT/ND, neg HSM,  Extremities: No peripheral edema, neg clubing, cyanosis  Vascular: 2+ peripheral pulses  Neurological: A/O x 3, no focal deficits  Psychiatric: Normal mood, normal affect  Skin: No rashes      LABS:                        14.1   24.6  )-----------( 121      ( 27 Jan 2018 08:15 )             42.6     01-27    142  |  109<H>  |  12  ----------------------------<  96  3.9   |  25  |  0.63    Ca    9.1      27 Jan 2018 08:15    TPro  6.1  /  Alb  3.2<L>  /  TBili  0.5  /  DBili  x   /  AST  41<H>  /  ALT  29  /  AlkPhos  82  01-26          RADIOLOGY & ADDITIONAL TESTS:

## 2018-01-27 NOTE — PROGRESS NOTE ADULT - SUBJECTIVE AND OBJECTIVE BOX
All interim records and events noted.    feeling well  son present   for lap sigmoidectomy on Monday    MEDICATIONS  (STANDING):  atorvastatin 40 milliGRAM(s) Oral at bedtime  cholecalciferol 1000 Unit(s) Oral daily  cyanocobalamin 1000 MICROGram(s) Oral daily  gabapentin 100 milliGRAM(s) Oral two times a day  levETIRAcetam 500 milliGRAM(s) Oral three times a day  metoprolol     tartrate 25 milliGRAM(s) Oral two times a day  pantoprazole  Injectable 40 milliGRAM(s) IV Push daily    MEDICATIONS  (PRN):      Vital Signs Last 24 Hrs  T(C): 36.9 (27 Jan 2018 05:25), Max: 36.9 (27 Jan 2018 05:25)  T(F): 98.5 (27 Jan 2018 05:25), Max: 98.5 (27 Jan 2018 05:25)  HR: 67 (27 Jan 2018 05:25) (67 - 80)  BP: 139/75 (27 Jan 2018 05:25) (110/64 - 139/75)  BP(mean): --  RR: 17 (27 Jan 2018 05:25) (16 - 17)  SpO2: 95% (27 Jan 2018 05:25) (93% - 95%)    PHYSICAL EXAM  General: well developed  well nourished, in no acute distress  Head: atraumatic, normocephalic  ENT: sclera anicteric, buccal mucosa moist  Neck: supple, trachea midline, no palpable masses  CV: S1 S2, regular rate and rhythm  Lungs: clear to auscultation, no wheezes/rhonchi  Abdomen: soft, nontender, bowel sounds present, no palpable masses  Extrem: no clubbing/cyanosis/edema  Skin: no significant increased ecchymosis/petechiae  Neuro: alert and oriented X3,  no focal deficits      LABS:             14.1   24.6  )-----------( 121      ( 01-27 @ 08:15 )             42.6                13.5   23.4  )-----------( 114      ( 01-26 @ 07:19 )             41.6                13.0   21.5  )-----------( 118      ( 01-25 @ 07:56 )             38.4       01-27    142  |  109<H>  |  12  ----------------------------<  96  3.9   |  25  |  0.63    Ca    9.1      27 Jan 2018 08:15    TPro  6.1  /  Alb  3.2<L>  /  TBili  0.5  /  DBili  x   /  AST  41<H>  /  ALT  29  /  AlkPhos  82  01-26 01-22 @ 14:17  PT12.6 INR1.15  PTT29.7      RADIOLOGY & ADDITIONAL STUDIES:    IMPRESSION/RECOMMENDATIONS:

## 2018-01-28 LAB
ALBUMIN SERPL ELPH-MCNC: 3 G/DL — LOW (ref 3.3–5)
ALP SERPL-CCNC: 87 U/L — SIGNIFICANT CHANGE UP (ref 40–120)
ALT FLD-CCNC: 25 U/L — SIGNIFICANT CHANGE UP (ref 12–78)
ANION GAP SERPL CALC-SCNC: 9 MMOL/L — SIGNIFICANT CHANGE UP (ref 5–17)
AST SERPL-CCNC: 19 U/L — SIGNIFICANT CHANGE UP (ref 15–37)
BILIRUB SERPL-MCNC: 0.5 MG/DL — SIGNIFICANT CHANGE UP (ref 0.2–1.2)
BUN SERPL-MCNC: 12 MG/DL — SIGNIFICANT CHANGE UP (ref 7–23)
CALCIUM SERPL-MCNC: 8.8 MG/DL — SIGNIFICANT CHANGE UP (ref 8.5–10.1)
CHLORIDE SERPL-SCNC: 110 MMOL/L — HIGH (ref 96–108)
CO2 SERPL-SCNC: 24 MMOL/L — SIGNIFICANT CHANGE UP (ref 22–31)
CREAT SERPL-MCNC: 0.72 MG/DL — SIGNIFICANT CHANGE UP (ref 0.5–1.3)
CULTURE RESULTS: SIGNIFICANT CHANGE UP
CULTURE RESULTS: SIGNIFICANT CHANGE UP
GLUCOSE SERPL-MCNC: 97 MG/DL — SIGNIFICANT CHANGE UP (ref 70–99)
HCT VFR BLD CALC: 42 % — SIGNIFICANT CHANGE UP (ref 39–50)
HGB BLD-MCNC: 14.2 G/DL — SIGNIFICANT CHANGE UP (ref 13–17)
INR BLD: 1.26 RATIO — HIGH (ref 0.88–1.16)
MCHC RBC-ENTMCNC: 30 PG — SIGNIFICANT CHANGE UP (ref 27–34)
MCHC RBC-ENTMCNC: 33.6 GM/DL — SIGNIFICANT CHANGE UP (ref 32–36)
MCV RBC AUTO: 89 FL — SIGNIFICANT CHANGE UP (ref 80–100)
PLATELET # BLD AUTO: 123 K/UL — LOW (ref 150–400)
POTASSIUM SERPL-MCNC: 3.8 MMOL/L — SIGNIFICANT CHANGE UP (ref 3.5–5.3)
POTASSIUM SERPL-SCNC: 3.8 MMOL/L — SIGNIFICANT CHANGE UP (ref 3.5–5.3)
PROT SERPL-MCNC: 6.3 G/DL — SIGNIFICANT CHANGE UP (ref 6–8.3)
PROTHROM AB SERPL-ACNC: 13.8 SEC — HIGH (ref 9.8–12.7)
RBC # BLD: 4.72 M/UL — SIGNIFICANT CHANGE UP (ref 4.2–5.8)
RBC # FLD: 13 % — SIGNIFICANT CHANGE UP (ref 10.3–14.5)
SODIUM SERPL-SCNC: 143 MMOL/L — SIGNIFICANT CHANGE UP (ref 135–145)
SPECIMEN SOURCE: SIGNIFICANT CHANGE UP
SPECIMEN SOURCE: SIGNIFICANT CHANGE UP
WBC # BLD: 26.3 K/UL — HIGH (ref 3.8–10.5)
WBC # FLD AUTO: 26.3 K/UL — HIGH (ref 3.8–10.5)

## 2018-01-28 PROCEDURE — 99233 SBSQ HOSP IP/OBS HIGH 50: CPT

## 2018-01-28 RX ADMIN — PREGABALIN 1000 MICROGRAM(S): 225 CAPSULE ORAL at 11:17

## 2018-01-28 RX ADMIN — Medication 1000 UNIT(S): at 13:03

## 2018-01-28 RX ADMIN — Medication 50 MILLIGRAM(S): at 17:03

## 2018-01-28 RX ADMIN — GABAPENTIN 100 MILLIGRAM(S): 400 CAPSULE ORAL at 17:02

## 2018-01-28 RX ADMIN — Medication 500 MILLIGRAM(S): at 06:42

## 2018-01-28 RX ADMIN — PANTOPRAZOLE SODIUM 40 MILLIGRAM(S): 20 TABLET, DELAYED RELEASE ORAL at 11:17

## 2018-01-28 RX ADMIN — GABAPENTIN 100 MILLIGRAM(S): 400 CAPSULE ORAL at 06:42

## 2018-01-28 RX ADMIN — ATORVASTATIN CALCIUM 40 MILLIGRAM(S): 80 TABLET, FILM COATED ORAL at 21:27

## 2018-01-28 RX ADMIN — LEVETIRACETAM 500 MILLIGRAM(S): 250 TABLET, FILM COATED ORAL at 06:42

## 2018-01-28 RX ADMIN — LEVETIRACETAM 500 MILLIGRAM(S): 250 TABLET, FILM COATED ORAL at 13:03

## 2018-01-28 RX ADMIN — NEOMYCIN SULFATE 500 MILLIGRAM(S): 500 TABLET ORAL at 21:28

## 2018-01-28 RX ADMIN — Medication 500 MILLIGRAM(S): at 21:27

## 2018-01-28 RX ADMIN — LEVETIRACETAM 500 MILLIGRAM(S): 250 TABLET, FILM COATED ORAL at 21:28

## 2018-01-28 RX ADMIN — NEOMYCIN SULFATE 500 MILLIGRAM(S): 500 TABLET ORAL at 06:42

## 2018-01-28 RX ADMIN — Medication 50 MILLIGRAM(S): at 06:42

## 2018-01-28 NOTE — PROGRESS NOTE ADULT - ASSESSMENT
IMPRESSION pt with rectal bleeding secondary to colonic mass  PLAN will attempt to resolve issues with his cardiologist and reschedule OR.

## 2018-01-28 NOTE — PROGRESS NOTE ADULT - ASSESSMENT
71yo M with PMH of CVA (2010) with subsequent left sided hemiplegia and balance difficulties, CAD s/p stents x2 (2010), CML (diagnosed in 2008 but has never been treated per patient), seizure disorder (has had 1 seizure in the past) presents with one episode of dark red blood per rectum. Patient states that he went to urinate earlier today and noted dark red blood come out from his "backside." He did not have a bowel movement, it was only blood. He has never experienced anything like this in the past. Up until today he has been in his usual state of health. Denies any fevers, chills, HA, dizziness, CP, palp, SOB, cough, abd pain, N/V/D, dysuria, hematuria.      - The pt's son contacted outpatient cardiologist Dr. Aden. I spoke with Dr. Aden. He states that the patient a few months ago was supposed to get a nuclear stress test and did not go for it. He was noted to have asymptomatic NSVT on a holter. At this time the patient has a large sigmoid mass which needs removal.  Currently he has no active cardiac conditions. No signs of ischemia, ADHF, clinical exam not consistent with stenotic valvular disease, no unstable arrhythmias noted. Doing any ischemic evaluation would not  given his clinical circumstances. Therefore able to proceed with this urgent colon  surgery without any further cardiac workup. Routine hemodynamic monitoring is suggested during the procedure. Suggest to cont BB pre and post proceudure.     - H&H stable 14/46 no further bleeding during hospitalization as per pt.    - Continue statin therapy for now  - Cont to hold antiplatelets for now. I would like to resume ASA if possible.   - hemodynamics stable as per flow sheet  - Monitor electrolytes.  Maintain K >4 Mg >2  - other cardiac workup as indicated by clinical course  - will follow

## 2018-01-28 NOTE — PROGRESS NOTE ADULT - SUBJECTIVE AND OBJECTIVE BOX
[facility] -- Phil Hutchison, Magaly Garibay, Elmer Ty Savella  Office # [phone]      Follow Up:  CAD, preop     Subjective/Observations: Patient seen and examined. Events noted. Resting comfortably in bed. No complaints of chest pain, dyspnea, or palpitations reported. No signs of orthopnea or PND.       REVIEW OF SYSTEMS: All other review of systems is negative unless indicated above    PAST MEDICAL & SURGICAL HISTORY:  CML (chronic myelocytic leukemia)  Seizure syndrome  HTN (hypertension)  CVA (cerebral vascular accident)  HLD (hyperlipidemia)  CAD (coronary artery disease)  S/P coronary artery stent placement  H/O major orthopedic surgery: left ankle  S/P cholecystectomy      MEDICATIONS  (STANDING):  atorvastatin 40 milliGRAM(s) Oral at bedtime  cholecalciferol 1000 Unit(s) Oral daily  cyanocobalamin 1000 MICROGram(s) Oral daily  erythromycin     base Tablet 500 milliGRAM(s) Oral three times a day  gabapentin 100 milliGRAM(s) Oral two times a day  levETIRAcetam 500 milliGRAM(s) Oral three times a day  metoprolol     tartrate 50 milliGRAM(s) Oral two times a day  neomycin 500 milliGRAM(s) Oral three times a day  pantoprazole  Injectable 40 milliGRAM(s) IV Push daily    MEDICATIONS  (PRN):      Allergies    No Known Allergies    Intolerances            Vital Signs Last 24 Hrs  T(C): 37.3 (28 Jan 2018 05:42), Max: 37.3 (28 Jan 2018 05:42)  T(F): 99.1 (28 Jan 2018 05:42), Max: 99.1 (28 Jan 2018 05:42)  HR: 66 (28 Jan 2018 05:42) (66 - 67)  BP: 106/74 (28 Jan 2018 05:42) (104/80 - 106/74)  BP(mean): --  RR: 17 (28 Jan 2018 05:42) (17 - 17)  SpO2: 94% (28 Jan 2018 05:42) (93% - 94%)    I&O's Summary    27 Jan 2018 07:01  -  28 Jan 2018 07:00  --------------------------------------------------------  IN: 360 mL / OUT: 400 mL / NET: -40 mL          PHYSICAL EXAM:     Constitutional: NAD, awake and alert, well-developed  HEENT: Moist Mucous Membranes, Anicteric  Pulmonary: Decreased breath sounds b/l. No rales, crackles or wheeze appreciated.   Cardiovascular: Regular, S1 and S2, No murmurs, rubs, gallops or clicks  Gastrointestinal: Bowel Sounds present, soft, nontender.   Lymph: trace dependent peripheral edema. No lymphadenopathy.  Skin: No visible rashes or ulcers.  Psych:  Mood & affect appropriate    LABS: All Labs Reviewed:                        14.2   26.3  )-----------( 123      ( 28 Jan 2018 06:50 )             42.0                         14.1   24.6  )-----------( 121      ( 27 Jan 2018 08:15 )             42.6                         13.5   23.4  )-----------( 114      ( 26 Jan 2018 07:19 )             41.6     28 Jan 2018 06:50    143    |  110    |  12     ----------------------------<  97     3.8     |  24     |  0.72   27 Jan 2018 08:15    142    |  109    |  12     ----------------------------<  96     3.9     |  25     |  0.63   26 Jan 2018 07:19    144    |  110    |  12     ----------------------------<  103    3.6     |  24     |  0.73     Ca    8.8        28 Jan 2018 06:50  Ca    9.1        27 Jan 2018 08:15  Ca    8.3        26 Jan 2018 07:19    TPro  6.3    /  Alb  3.0    /  TBili  0.5    /  DBili  x      /  AST  19     /  ALT  25     /  AlkPhos  87     28 Jan 2018 06:50  TPro  6.1    /  Alb  3.2    /  TBili  0.5    /  DBili  x      /  AST  41     /  ALT  29     /  AlkPhos  82     26 Jan 2018 07:19    PT/INR - ( 28 Jan 2018 06:50 )   PT: 13.8 sec;   INR: 1.26 ratio         < from: TTE Echo Doppler w/o Cont (01.27.18 @ 10:43) >     EXAM:  ECHO TTE W/O CON COMP W/DOPPLR         PROCEDURE DATE:  01/27/2018        INTERPRETATION:  INDICATION: CAD  Referring M.D.:Glen  Blood Pressure 139/75        Weight (kg) :86     Height (cm):175       BSA (sq m): 2.02  Technician: Curtis    Dimensions:    LA 2.4       Normal Values: 2.0 - 4.0 cm    Ao 3.2        Normal Values: 2.0 - 3.8 cm  SEPTUM 1.0       Normal Values: 0.6 - 1.2 cm  PWT 0.7       Normal Values: 0.6 - 1.1 cm  LVIDd 5.0         Normal Values: 3.0 - 5.6 cm  LVIDs 3.2  Normal Values: 1.8 - 4.0 cm      OBSERVATIONS:  Technically difficult study  Mitral Valve: Mitral annular calcification with normal mitral valve   opening. Trace mitral regurgitation.  Aortic Valve/Aorta: Calcified trileaflet aortic valve with normal opening  Tricuspid Valve: Normal tricuspid valve. Trace tricuspid regurgitation.  Pulmonic Valve: The pulmonic valve is not well visualized. Probably   normal.  Left Atrium: Normal  Right Atrium: Normal  Left Ventricle: Endocardium is not well-visualized. Overall there is   grossly preserved left ventricular systolic function. The EF is   approximately 65%.  Right Ventricle: Normal right ventricular size and function.  Pericardium/Pleura: No pericardial effusion noted.  Pulmonary/RV Pressure: The right ventricular systolic pressure is   estimated to be 21mmHg, assuming that the right atrial pressure is   estimated to be 8 mmHg. This is consistent with normal pulmonary   pressures.  LV Diastolic Function: Mild diastolic dysfunction    Conclusion: Technically difficult study. Grossly preserved left   ventricular systolic function. EF 65%. Mild diastolic dysfunction.                  DONTA MCINTYRE M.D., ATTENDING CARDIOLOGIST  This document has been electronically signed. Jan 28 2018 11:09AM                < end of copied text >

## 2018-01-28 NOTE — PROGRESS NOTE ADULT - SUBJECTIVE AND OBJECTIVE BOX
INTERVAL HPI/OVERNIGHT EVENTS:  prep today  MEDICATIONS  (STANDING):  atorvastatin 40 milliGRAM(s) Oral at bedtime  cholecalciferol 1000 Unit(s) Oral daily  cyanocobalamin 1000 MICROGram(s) Oral daily  erythromycin     base Tablet 500 milliGRAM(s) Oral three times a day  gabapentin 100 milliGRAM(s) Oral two times a day  levETIRAcetam 500 milliGRAM(s) Oral three times a day  magnesium citrate Solution 300 milliLiter(s) Oral once  metoprolol     tartrate 50 milliGRAM(s) Oral two times a day  neomycin 500 milliGRAM(s) Oral three times a day  pantoprazole  Injectable 40 milliGRAM(s) IV Push daily    MEDICATIONS  (PRN):      Allergies    No Known Allergies    Intolerances        Review of Systems:    General:  No wt loss, fevers, chills, night sweats,fatigue,   Eyes:  Good vision, no reported pain  ENT:  No sore throat, pain, runny nose, dysphagia  CV:  No pain, palpitatioins, hypo/hypertension  Resp:  No dyspnea, cough, tachypnea, wheezing  GI:  No pain, No nausea, No vomiting, No diarrhea, No constipatiion, No weight loss, No fever, No pruritis, No rectal bleeding, No tarry stools, No dysphagia,  :  No pain, bleeding, incontinence, nocturia  Muscle:  No pain, weakness  Neuro:  No weakness, tingling, memory problems  Psych:  No fatigue, insomnia, mood problems, depression  Endocrine:  No polyuria, polydypsia, cold/heat intolerance  Heme:  No petechiae, ecchymosis, easy bruisability  Skin:  No rash, tattoos, scars, edema      Vital Signs Last 24 Hrs  T(C): 37.3 (28 Jan 2018 05:42), Max: 37.3 (28 Jan 2018 05:42)  T(F): 99.1 (28 Jan 2018 05:42), Max: 99.1 (28 Jan 2018 05:42)  HR: 66 (28 Jan 2018 05:42) (66 - 93)  BP: 106/74 (28 Jan 2018 05:42) (104/80 - 131/72)  BP(mean): --  RR: 17 (28 Jan 2018 05:42) (14 - 17)  SpO2: 94% (28 Jan 2018 05:42) (83% - 94%)    PHYSICAL EXAM:    Constitutional: NAD, well-developed  HEENT: EOMI, throat clear  Neck: No LAD, supple  Respiratory: CTA and P  Cardiovascular: S1 and S2, RRR, no M  Gastrointestinal: BS+, soft, NT/ND, neg HSM,  Extremities: No peripheral edema, neg clubing, cyanosis  Vascular: 2+ peripheral pulses  Neurological: A/O x 3, no focal deficits  Psychiatric: Normal mood, normal affect  Skin: No rashes      LABS:                        14.2   26.3  )-----------( 123      ( 28 Jan 2018 06:50 )             42.0     01-28    143  |  110<H>  |  12  ----------------------------<  97  3.8   |  24  |  0.72    Ca    8.8      28 Jan 2018 06:50    TPro  6.3  /  Alb  3.0<L>  /  TBili  0.5  /  DBili  x   /  AST  19  /  ALT  25  /  AlkPhos  87  01-28    PT/INR - ( 28 Jan 2018 06:50 )   PT: 13.8 sec;   INR: 1.26 ratio               RADIOLOGY & ADDITIONAL TESTS:

## 2018-01-28 NOTE — PROGRESS NOTE ADULT - PROBLEM SELECTOR PLAN 1
Colonoscopy showed large sigmoid mass. Possible surgery on Monday.   -follow up biopsy showing tubular adenoma  -CEA 3.2 (wnl)  -Oncology- Dr. Hsieh following  -Cardio-Dr. Payne- [facility].   -Spoke to Dr. Aden [phone] and appears to be concerned about upcoming clearance for surgery. As per Dr. Aden patient has strong family history of premature cardiac death/ CAD s/p stent to RCA/circumflex, had a holter done recently showing NSVT.   -Discussed case with Dr. Payne to see if any further cardiac work up like a nuclear stress test would be indicated at this time, Dr. Payne has reviewed case and does not feel further cardiac work up indicated at this time  -Son [phone] hesitant to pursue with surgery. Holding surgery for now, discussed with Dr. Mcdowell. Will d/w Cardiologist Dr. Aden and Dr. Payne what ultimate plan will be and let family decide on next step

## 2018-01-28 NOTE — PROGRESS NOTE ADULT - ATTENDING COMMENTS
Pending cardiology final recommendations ( Dr. Payne and outpatient cardiologist Dr. Aden) will likely plan for surgery.      Dr. Aden (cardiologist # [phone])  Son ([phone])

## 2018-01-28 NOTE — PROGRESS NOTE ADULT - SUBJECTIVE AND OBJECTIVE BOX
Anesthesia preop evaluation.    Patient seen, chart reviewed for procedure tomorrow.    Awaiting medical clearance.    Cleared by cardiology, neuro, heme/onc

## 2018-01-28 NOTE — PROGRESS NOTE ADULT - SUBJECTIVE AND OBJECTIVE BOX
Subjective: Discussed with pt and pt"s son (Jan), they do not want Surgery tomorrow.  Pt Cardiologist (Dr WERNER) thinks he should have a stress test first.  I have explained to the pt and son that stress tests are not done in the Hospital and they are waiting to speak again with the cardiologist.    Objective:  Vital Signs Last 24 Hrs  T(C): 37.3 (28 Jan 2018 05:42), Max: 37.3 (28 Jan 2018 05:42)  T(F): 99.1 (28 Jan 2018 05:42), Max: 99.1 (28 Jan 2018 05:42)  HR: 66 (28 Jan 2018 05:42) (66 - 67)  BP: 106/74 (28 Jan 2018 05:42) (104/80 - 106/74)  BP(mean): --  RR: 17 (28 Jan 2018 05:42) (17 - 17)  SpO2: 94% (28 Jan 2018 05:42) (93% - 94%)    Heent: TRINH ANGEL  Pul: clear  Cor: RSR, without murmurs  Abdomen: normal bowel sounds, without distension  Extremities: without edema, motor/sensory intact, without calf pain, Homans sign negative.                        14.2   26.3  )-----------( 123      ( 28 Jan 2018 06:50 )             42.0       01-28    143  |  110<H>  |  12  ----------------------------<  97  3.8   |  24  |  0.72    Ca    8.8      28 Jan 2018 06:50    TPro  6.3  /  Alb  3.0<L>  /  TBili  0.5  /  DBili  x   /  AST  19  /  ALT  25  /  AlkPhos  87  01-28 01-27 @ 07:01  -  01-28 @ 07:00  --------------------------------------------------------  IN:    Oral Fluid: 360 mL  Total IN: 360 mL    OUT:    Voided: 400 mL  Total OUT: 400 mL    Total NET: -40 mL

## 2018-01-28 NOTE — PROGRESS NOTE ADULT - SUBJECTIVE AND OBJECTIVE BOX
Neurology follow up note    HECTOR PALADINPALADINO72yMale      Interval History:    Patient feels ok no new complaints.    MEDICATIONS    atorvastatin 40 milliGRAM(s) Oral at bedtime  cholecalciferol 1000 Unit(s) Oral daily  cyanocobalamin 1000 MICROGram(s) Oral daily  erythromycin     base Tablet 500 milliGRAM(s) Oral three times a day  gabapentin 100 milliGRAM(s) Oral two times a day  levETIRAcetam 500 milliGRAM(s) Oral three times a day  magnesium citrate Solution 300 milliLiter(s) Oral once  metoprolol     tartrate 50 milliGRAM(s) Oral two times a day  neomycin 500 milliGRAM(s) Oral three times a day  pantoprazole  Injectable 40 milliGRAM(s) IV Push daily      Allergies    No Known Allergies    Intolerances            Vital Signs Last 24 Hrs  T(C): 37.3 (28 Jan 2018 05:42), Max: 37.3 (28 Jan 2018 05:42)  T(F): 99.1 (28 Jan 2018 05:42), Max: 99.1 (28 Jan 2018 05:42)  HR: 66 (28 Jan 2018 05:42) (66 - 93)  BP: 106/74 (28 Jan 2018 05:42) (104/80 - 131/72)  BP(mean): --  RR: 17 (28 Jan 2018 05:42) (14 - 17)  SpO2: 94% (28 Jan 2018 05:42) (83% - 94%)    REVIEW OF SYSTEMS:    Constitutional: No fever, chills, fatigue, weakness  Eyes: no eye pain, visual disturbances, or discharge  ENT:  No difficulty hearing, tinnitus, vertigo; No sinus or throat pain  Neck: No pain or stiffness  Respiratory: No cough, dyspnea, wheezing   Cardiovascular: No chest pain, palpitations,   Gastrointestinal: No abdominal or epigastric pain. No nausea, vomiting  No diarrhea or constipation.   Genitourinary: No dysuria, frequency, hematuria or incontinence  Neurological: No headaches, lightheadedness, vertigo, numbness or tremors  Psychiatric: No depression, anxiety, mood swings or difficulty sleeping  Musculoskeletal: No joint pain or swelling; No muscle, back or extremity pain  Skin: No itching, burning, rashes or lesions   Lymph Nodes: No enlarged glands  Endocrine: No heat or cold intolerance; No hair loss   Allergy and Immunologic: No hives or eczema    On Neurological Examination:    Mental Status -The patient is awake and alert.      Extraocular movements were intact.      Pupils equal, round, and reactive bilaterally, 3 mm to 2.      Speech was fluent.      Smile symmetric.      Motor, right upper, right lower was 4+/5, left upper arm was in a flexed position, had a sling.  There is subtle flexion extension at the elbow and slight movement of the thumb, first and second digits.  Left lower extremity, subtle movement at the hip and knee, as per patient this is his baseline motor status.      Sensory:  The patient has decreased light touch to left arm, left leg, as per the patient this is baseline sensory status.      Follow simple commands      GENERAL Exam: Nontoxic , No Acute Distress   	  HEENT:  normocephalic, atraumatic  		  LUNGS: Decreased bilaterally  	  HEART: Normal S1S2   No murmur RRR        	  GI/ ABDOMEN:  Soft  Non tender    EXTREMITIES:   No Edema  No Clubbing  No Cyanosis No Edema  	   SKIN: Normal  No Ecchymosis               LABS:  CBC Full  -  ( 28 Jan 2018 06:50 )  WBC Count : 26.3 K/uL  Hemoglobin : 14.2 g/dL  Hematocrit : 42.0 %  Platelet Count - Automated : 123 K/uL  Mean Cell Volume : 89.0 fl  Mean Cell Hemoglobin : 30.0 pg  Mean Cell Hemoglobin Concentration : 33.6 gm/dL  Auto Neutrophil # : x  Auto Lymphocyte # : x  Auto Monocyte # : x  Auto Eosinophil # : x  Auto Basophil # : x  Auto Neutrophil % : x  Auto Lymphocyte % : x  Auto Monocyte % : x  Auto Eosinophil % : x  Auto Basophil % : x      01-28    143  |  110<H>  |  12  ----------------------------<  97  3.8   |  24  |  0.72    Ca    8.8      28 Jan 2018 06:50    TPro  6.3  /  Alb  3.0<L>  /  TBili  0.5  /  DBili  x   /  AST  19  /  ALT  25  /  AlkPhos  87  01-28    Hemoglobin A1C:     LIVER FUNCTIONS - ( 28 Jan 2018 06:50 )  Alb: 3.0 g/dL / Pro: 6.3 g/dL / ALK PHOS: 87 U/L / ALT: 25 U/L / AST: 19 U/L / GGT: x           Vitamin B12   PT/INR - ( 28 Jan 2018 06:50 )   PT: 13.8 sec;   INR: 1.26 ratio               RADIOLOGY      ANALYSIS AND PLAN:  This is a 72-year-old with a history of cerebrovascular accident, epilepsy, and now with a sigmoid mass that needs surgical intervention.    1.	For history of cerebrovascular accident with a history of dark melena, the patient needs to undergo surgical intervention.  The patient's antiplatelets need to be held, risks versus benefits have to be weighed.  2.	For history of epilepsy, continue the patient on his Keppra.  3.	From Neurology standpoint, the patient is cleared for any type of surgical intervention, but please recommend do not cause any drops in blood pressure.  If possible, keep the blood pressure around 120 and try to avoid any type of bradycardic episodes.  4.	Continue to monitor the patient's H and H.  5.	neurologic wise appears stable   6.	spoke to son in detail  in past he understands overall issues and risks     Thank you for the courtesy of consultation.    Physical therapy evaluation as tolerated  OOB to chair/ambulation with assistance only if possible.    Greater than 20 minutes spent in direct patient care reviewing  the notes, lab data/ imaging , discussion with multidisciplinary team.

## 2018-01-28 NOTE — PROGRESS NOTE ADULT - SUBJECTIVE AND OBJECTIVE BOX
Patient is a 72y old  Male who presents with a chief complaint of "I was bleeding" (24 Jan 2018 09:14) Colon mass found, pending colon resection with Surgery Dr. Mcdowell.     INTERVAL HPI: Pt seen and examined. No acute events overnight. Patient denies any chest pain, SOB. No abdominal pain unless when you palpate.     MEDICATIONS  (STANDING):  atorvastatin 40 milliGRAM(s) Oral at bedtime  cholecalciferol 1000 Unit(s) Oral daily  cyanocobalamin 1000 MICROGram(s) Oral daily  erythromycin     base Tablet 500 milliGRAM(s) Oral three times a day  gabapentin 100 milliGRAM(s) Oral two times a day  levETIRAcetam 500 milliGRAM(s) Oral three times a day  metoprolol     tartrate 50 milliGRAM(s) Oral two times a day  neomycin 500 milliGRAM(s) Oral three times a day  pantoprazole  Injectable 40 milliGRAM(s) IV Push daily    MEDICATIONS  (PRN):      MEDICATIONS  (PRN):    Vital Signs Last 24 Hrs  T(C): 36.7 (27 Jan 2018 13:38), Max: 36.9 (27 Jan 2018 05:25)  T(F): 98.1 (27 Jan 2018 13:38), Max: 98.5 (27 Jan 2018 05:25)  HR: 93 (27 Jan 2018 13:38) (67 - 93)  BP: 131/72 (27 Jan 2018 13:38) (126/82 - 139/75)  BP(mean): --  RR: 14 (27 Jan 2018 13:38) (14 - 17)  SpO2: 83% (27 Jan 2018 13:38) (83% - 95%)    PHYSICAL EXAM:  GENERAL: NAD  NERVOUS SYSTEM:  Alert & Oriented X3, Good concentration; Motor Strength 1/5 LUE and LLE, 5/5 strength in RUE, RLL  CHEST/LUNG: Clear to percussion bilaterally; No rales, rhonchi, wheezing, or rubs  HEART: S1S2+, Regular rate and rhythm; No murmurs, rubs, or gallops  ABDOMEN: Soft,+ LLQ tenderness on deep palpation, Nondistended; +BS  EXTREMITIES:  2+ Peripheral Pulses, No clubbing, cyanosis, or edema  SKIN: No rashes or lesions    LABS:                        14.2   26.3  )-----------( 123      ( 28 Jan 2018 06:50 )             42.0     01-28    143  |  110<H>  |  12  ----------------------------<  97  3.8   |  24  |  0.72    Ca    8.8      28 Jan 2018 06:50    TPro  6.3  /  Alb  3.0<L>  /  TBili  0.5  /  DBili  x   /  AST  19  /  ALT  25  /  AlkPhos  87  01-28    PT/INR - ( 28 Jan 2018 06:50 )   PT: 13.8 sec;   INR: 1.26 ratio

## 2018-01-29 PROCEDURE — 99232 SBSQ HOSP IP/OBS MODERATE 35: CPT

## 2018-01-29 PROCEDURE — 99233 SBSQ HOSP IP/OBS HIGH 50: CPT | Mod: GC

## 2018-01-29 RX ORDER — TRAMADOL HYDROCHLORIDE 50 MG/1
1 TABLET ORAL
Qty: 0 | Refills: 0 | COMMUNITY

## 2018-01-29 RX ORDER — CLOPIDOGREL BISULFATE 75 MG/1
1 TABLET, FILM COATED ORAL
Qty: 0 | Refills: 0 | COMMUNITY

## 2018-01-29 RX ORDER — ASPIRIN/CALCIUM CARB/MAGNESIUM 324 MG
81 TABLET ORAL DAILY
Qty: 0 | Refills: 0 | Status: DISCONTINUED | OUTPATIENT
Start: 2018-01-29 | End: 2018-01-30

## 2018-01-29 RX ADMIN — ATORVASTATIN CALCIUM 40 MILLIGRAM(S): 80 TABLET, FILM COATED ORAL at 23:04

## 2018-01-29 RX ADMIN — Medication 50 MILLIGRAM(S): at 17:28

## 2018-01-29 RX ADMIN — Medication 50 MILLIGRAM(S): at 06:01

## 2018-01-29 RX ADMIN — NEOMYCIN SULFATE 500 MILLIGRAM(S): 500 TABLET ORAL at 14:51

## 2018-01-29 RX ADMIN — Medication 81 MILLIGRAM(S): at 13:04

## 2018-01-29 RX ADMIN — LEVETIRACETAM 500 MILLIGRAM(S): 250 TABLET, FILM COATED ORAL at 14:50

## 2018-01-29 RX ADMIN — NEOMYCIN SULFATE 500 MILLIGRAM(S): 500 TABLET ORAL at 17:28

## 2018-01-29 RX ADMIN — Medication 1000 UNIT(S): at 11:52

## 2018-01-29 RX ADMIN — LEVETIRACETAM 500 MILLIGRAM(S): 250 TABLET, FILM COATED ORAL at 06:01

## 2018-01-29 RX ADMIN — GABAPENTIN 100 MILLIGRAM(S): 400 CAPSULE ORAL at 06:01

## 2018-01-29 RX ADMIN — LEVETIRACETAM 500 MILLIGRAM(S): 250 TABLET, FILM COATED ORAL at 23:04

## 2018-01-29 RX ADMIN — PANTOPRAZOLE SODIUM 40 MILLIGRAM(S): 20 TABLET, DELAYED RELEASE ORAL at 11:52

## 2018-01-29 RX ADMIN — PREGABALIN 1000 MICROGRAM(S): 225 CAPSULE ORAL at 11:52

## 2018-01-29 RX ADMIN — GABAPENTIN 100 MILLIGRAM(S): 400 CAPSULE ORAL at 17:28

## 2018-01-29 NOTE — PROGRESS NOTE ADULT - PROBLEM SELECTOR PLAN 1
s/p colonoscopy with large sigmoid mass--f/u biopsy  ppi once a day  oncology on case  surgery on case-- plan for lap sigmoidectomy as outpatient after nuclear stress test

## 2018-01-29 NOTE — PROGRESS NOTE ADULT - SUBJECTIVE AND OBJECTIVE BOX
Patient is a 72y old  Male who presents with a chief complaint of "I was bleeding" (24 Jan 2018 09:14) Colon mass found, sigmoidectomy postponed. Patient's son and cardiologist requesting stress test before procedure.     INTERVAL HPI: Pt seen and examined. No acute events overnight. Patient denies any chest pain, SOB and abdominal pain.     MEDICATIONS  (STANDING):  atorvastatin 40 milliGRAM(s) Oral at bedtime  cholecalciferol 1000 Unit(s) Oral daily  cyanocobalamin 1000 MICROGram(s) Oral daily  gabapentin 100 milliGRAM(s) Oral two times a day  levETIRAcetam 500 milliGRAM(s) Oral three times a day  metoprolol     tartrate 50 milliGRAM(s) Oral two times a day  neomycin 500 milliGRAM(s) Oral three times a day  pantoprazole  Injectable 40 milliGRAM(s) IV Push daily    Vital Signs Last 24 Hrs  T(C): 36.6 (29 Jan 2018 05:54), Max: 37.2 (28 Jan 2018 14:41)  T(F): 97.8 (29 Jan 2018 05:54), Max: 98.9 (28 Jan 2018 14:41)  HR: 70 (29 Jan 2018 05:54) (70 - 78)  BP: 116/62 (29 Jan 2018 05:54) (116/62 - 121/70)  RR: 18 (29 Jan 2018 05:54) (18 - 18)  SpO2: 95% (29 Jan 2018 05:54) (93% - 99%)    PHYSICAL EXAM:  GENERAL: NAD  NERVOUS SYSTEM:  Alert & Oriented X3, Good concentration; Motor Strength 1/5 LUE and LLE, 5/5 strength in RUE, RLL  CHEST/LUNG: Clear to percussion bilaterally; No rales, rhonchi, wheezing, or rubs  HEART: S1S2+, Regular rate and rhythm; No murmurs, rubs, or gallops  ABDOMEN: Soft,+ LLQ tenderness on deep palpation, Nondistended; +BS  EXTREMITIES:  2+ Peripheral Pulses, No clubbing, cyanosis, or edema  SKIN: No rashes or lesions    LABS:                                     14.2   26.3  )-----------( 123      ( 28 Jan 2018 06:50 )             42.0     28 Jan 2018 06:50    143    |  110    |  12     ----------------------------<  97     3.8     |  24     |  0.72     Ca    8.8        28 Jan 2018 06:50    TPro  6.3    /  Alb  3.0    /  TBili  0.5    /  DBili  x      /  AST  19     /  ALT  25     /  AlkPhos  87     28 Jan 2018 06:50    LIVER FUNCTIONS - ( 28 Jan 2018 06:50 )  Alb: 3.0 g/dL / Pro: 6.3 g/dL / ALK PHOS: 87 U/L / ALT: 25 U/L / AST: 19 U/L / GGT: x           PT/INR - ( 28 Jan 2018 06:50 )   PT: 13.8 sec;   INR: 1.26 ratio    CAPILLARY BLOOD GLUCOSE

## 2018-01-29 NOTE — PROGRESS NOTE ADULT - SUBJECTIVE AND OBJECTIVE BOX
[facility] -- Phil Hutchison, Lani, Magaly, Elmer Ty Savella  Office # [phone]      Follow Up:  CAD, preop     Subjective/Observations: Patient seen and examined. No acute events overnight. No complaints of chest pain, dyspnea, or palpitations reported. No signs of orthopnea or PND.       REVIEW OF SYSTEMS: All other review of systems is negative unless indicated above    PAST MEDICAL & SURGICAL HISTORY:  CML (chronic myelocytic leukemia)  Seizure syndrome  HTN (hypertension)  CVA (cerebral vascular accident)  HLD (hyperlipidemia)  CAD (coronary artery disease)  S/P coronary artery stent placement  H/O major orthopedic surgery: left ankle  S/P cholecystectomy      MEDICATIONS  (STANDING):  atorvastatin 40 milliGRAM(s) Oral at bedtime  cholecalciferol 1000 Unit(s) Oral daily  cyanocobalamin 1000 MICROGram(s) Oral daily  erythromycin     base Tablet 500 milliGRAM(s) Oral three times a day  gabapentin 100 milliGRAM(s) Oral two times a day  levETIRAcetam 500 milliGRAM(s) Oral three times a day  metoprolol     tartrate 50 milliGRAM(s) Oral two times a day  neomycin 500 milliGRAM(s) Oral three times a day  pantoprazole  Injectable 40 milliGRAM(s) IV Push daily    MEDICATIONS  (PRN):      Allergies    No Known Allergies    Intolerances        Vital Signs Last 24 Hrs  T(C): 36.6 (29 Jan 2018 05:54), Max: 37.2 (28 Jan 2018 14:41)  T(F): 97.8 (29 Jan 2018 05:54), Max: 98.9 (28 Jan 2018 14:41)  HR: 70 (29 Jan 2018 05:54) (70 - 78)  BP: 116/62 (29 Jan 2018 05:54) (116/62 - 121/70)  RR: 18 (29 Jan 2018 05:54) (18 - 18)  SpO2: 95% (29 Jan 2018 05:54) (93% - 99%)    I&O's Summary        PHYSICAL EXAM:     Constitutional: NAD, awake and alert, well-developed  HEENT: Moist Mucous Membranes, Anicteric  Pulmonary: CTA b/l. No rales, crackles or wheeze appreciated.   Cardiovascular: Regular, S1 and S2, No murmurs, rubs, gallops or clicks  Gastrointestinal: Bowel Sounds present, soft, nontender.   Lymph: trace dependent peripheral edema. No lymphadenopathy.  Skin: No visible rashes or ulcers.  Psych:  Mood & affect appropriate    LABS:                       14.2   26.3  )-----------( 123      ( 28 Jan 2018 06:50 )             42.0     28 Jan 2018 06:50    143    |  110    |  12     ----------------------------<  97     3.8     |  24     |  0.72     Ca    8.8        28 Jan 2018 06:50    TPro  6.3    /  Alb  3.0    /  TBili  0.5    /  DBili  x      /  AST  19     /  ALT  25     /  AlkPhos  87     28 Jan 2018 06:50    LIVER FUNCTIONS - ( 28 Jan 2018 06:50 )  Alb: 3.0 g/dL / Pro: 6.3 g/dL / ALK PHOS: 87 U/L / ALT: 25 U/L / AST: 19 U/L / GGT: x           PT/INR - ( 28 Jan 2018 06:50 )   PT: 13.8 sec;   INR: 1.26 ratio          EXAM:  ECHO TTE W/O CON COMP W/DOPPLR   PROCEDURE DATE:  01/27/2018        INTERPRETATION:  INDICATION: CAD  Referring M.D.:Glen  Blood Pressure 139/75        Weight (kg) :86     Height (cm):175       BSA (sq m): 2.02  Technician: Curtis    Dimensions:    LA 2.4       Normal Values: 2.0 - 4.0 cm    Ao 3.2        Normal Values: 2.0 - 3.8 cm  SEPTUM 1.0       Normal Values: 0.6 - 1.2 cm  PWT 0.7       Normal Values: 0.6 - 1.1 cm  LVIDd 5.0         Normal Values: 3.0 - 5.6 cm  LVIDs 3.2  Normal Values: 1.8 - 4.0 cm      OBSERVATIONS:  Technically difficult study  Mitral Valve: Mitral annular calcification with normal mitral valve   opening. Trace mitral regurgitation.  Aortic Valve/Aorta: Calcified trileaflet aortic valve with normal opening  Tricuspid Valve: Normal tricuspid valve. Trace tricuspid regurgitation.  Pulmonic Valve: The pulmonic valve is not well visualized. Probably   normal.  Left Atrium: Normal  Right Atrium: Normal  Left Ventricle: Endocardium is not well-visualized. Overall there is   grossly preserved left ventricular systolic function. The EF is   approximately 65%.  Right Ventricle: Normal right ventricular size and function.  Pericardium/Pleura: No pericardial effusion noted.  Pulmonary/RV Pressure: The right ventricular systolic pressure is   estimated to be 21mmHg, assuming that the right atrial pressure is   estimated to be 8 mmHg. This is consistent with normal pulmonary   pressures.  LV Diastolic Function: Mild diastolic dysfunction    Conclusion: Technically difficult study. Grossly preserved left   ventricular systolic function. EF 65%. Mild diastolic dysfunction.      DONTA MCINTYRE M.D., ATTENDING CARDIOLOGIST  This document has been electronically signed. Jan 28 2018 11:09AM

## 2018-01-29 NOTE — PROGRESS NOTE ADULT - PROBLEM SELECTOR PLAN 1
Colonoscopy showed large sigmoid mass. Surgery postponed.   -follow up biopsy showing tubular adenoma- high grade dysplasia  -CEA 3.2 (wnl)  -Oncology- Dr. Hsieh following  -Cardio-Dr. Payne- [facility]  -Spoke to Dr. Aden [phone] and appears to be concerned about upcoming clearance for surgery. As per Dr. Aden patient has strong family history of premature cardiac death/ CAD s/p stent to RCA/circumflex, had a holter done recently showing NSVT.   -d/c planning to assisted living facilty. Patient's family and cardiologist requesting further cardiac work up before surgery  -Son [phone] hesitant to pursue with surgery. Holding surgery for now, discussed with Dr. Mcdowell. Will d/w Cardiologist Dr. Aden and Dr. Payne. Colonoscopy showed large sigmoid mass. Surgery postponed.   -follow up biopsy showing tubular adenoma- high grade dysplasia  -CEA 3.2 (wnl)  -Oncology- Dr. Hsieh following signed off as patient biopsy negative, no plan for colon mass resection at this time.  -Cardio-Dr. Payne- [facility]  -Spoke to Dr. Aden [phone] and appears to be concerned about upcoming clearance for surgery. As per Dr. Aden patient has strong family history of premature cardiac death/ CAD s/p stent to RCA/circumflex, had a holter done recently showing NSVT. Discussed case with Dr. Payne/Dr Garibay, do not warrant further cardiac work up now would proceed with surgery. Patient and son hesitant will hold off on surgery now, spoke to Surgery Dr. Ambrose.  -Discharge patient to [facility] [place], Patient's family and cardiologist requesting further cardiac work up before surgery to be done by Dr. Aden outpatient cardiologist.

## 2018-01-29 NOTE — PROGRESS NOTE ADULT - SUBJECTIVE AND OBJECTIVE BOX
Neurology follow up note    HECTOR PALADINPALADINO72yMale      Interval History:    Patient feels ok no new complaints.    MEDICATIONS    aspirin enteric coated 81 milliGRAM(s) Oral daily  atorvastatin 40 milliGRAM(s) Oral at bedtime  cholecalciferol 1000 Unit(s) Oral daily  cyanocobalamin 1000 MICROGram(s) Oral daily  gabapentin 100 milliGRAM(s) Oral two times a day  levETIRAcetam 500 milliGRAM(s) Oral three times a day  metoprolol     tartrate 50 milliGRAM(s) Oral two times a day  neomycin 500 milliGRAM(s) Oral three times a day  pantoprazole  Injectable 40 milliGRAM(s) IV Push daily      Allergies    No Known Allergies    Intolerances            Vital Signs Last 24 Hrs  T(C): 37.2 (29 Jan 2018 13:05), Max: 37.2 (29 Jan 2018 13:05)  T(F): 98.9 (29 Jan 2018 13:05), Max: 98.9 (29 Jan 2018 13:05)  HR: 80 (29 Jan 2018 13:05) (70 - 80)  BP: 118/76 (29 Jan 2018 13:05) (116/62 - 119/67)  BP(mean): --  RR: 17 (29 Jan 2018 13:05) (17 - 18)  SpO2: 95% (29 Jan 2018 05:54) (93% - 99%)      REVIEW OF SYSTEMS:    Constitutional: No fever, chills, fatigue, weakness  Eyes: no eye pain, visual disturbances, or discharge  ENT:  No difficulty hearing, tinnitus, vertigo; No sinus or throat pain  Neck: No pain or stiffness  Respiratory: No cough, dyspnea, wheezing   Cardiovascular: No chest pain, palpitations,   Gastrointestinal: No abdominal or epigastric pain. No nausea, vomiting  No diarrhea or constipation.   Genitourinary: No dysuria, frequency, hematuria or incontinence  Neurological: No headaches, lightheadedness, vertigo, numbness or tremors  Psychiatric: No depression, anxiety, mood swings or difficulty sleeping  Musculoskeletal: No joint pain or swelling; No muscle, back or extremity pain  Skin: No itching, burning, rashes or lesions   Lymph Nodes: No enlarged glands  Endocrine: No heat or cold intolerance; No hair loss   Allergy and Immunologic: No hives or eczema    On Neurological Examination:    Mental Status -The patient is awake and alert.      Extraocular movements were intact.      Pupils equal, round, and reactive bilaterally, 3 mm to 2.      Speech was fluent.      Smile symmetric.      Motor, right upper, right lower was 4+/5, left upper arm was in a flexed position, had a sling.  There is subtle flexion extension at the elbow and slight movement of the thumb, first and second digits.  Left lower extremity, subtle movement at the hip and knee, as per patient this is his baseline motor status.      Sensory:  The patient has decreased light touch to left arm, left leg, as per the patient this is baseline sensory status.      Follow simple commands      GENERAL Exam: Nontoxic , No Acute Distress   	  HEENT:  normocephalic, atraumatic  		  LUNGS: Decreased bilaterally  	  HEART: Normal S1S2   No murmur RRR        	  GI/ ABDOMEN:  Soft  Non tender    EXTREMITIES:   No Edema  No Clubbing  No Cyanosis No Edema  	   SKIN: Normal  No Ecchymosis             LABS:  CBC Full  -  ( 28 Jan 2018 06:50 )  WBC Count : 26.3 K/uL  Hemoglobin : 14.2 g/dL  Hematocrit : 42.0 %  Platelet Count - Automated : 123 K/uL  Mean Cell Volume : 89.0 fl  Mean Cell Hemoglobin : 30.0 pg  Mean Cell Hemoglobin Concentration : 33.6 gm/dL  Auto Neutrophil # : x  Auto Lymphocyte # : x  Auto Monocyte # : x  Auto Eosinophil # : x  Auto Basophil # : x  Auto Neutrophil % : x  Auto Lymphocyte % : x  Auto Monocyte % : x  Auto Eosinophil % : x  Auto Basophil % : x      01-28    143  |  110<H>  |  12  ----------------------------<  97  3.8   |  24  |  0.72    Ca    8.8      28 Jan 2018 06:50    TPro  6.3  /  Alb  3.0<L>  /  TBili  0.5  /  DBili  x   /  AST  19  /  ALT  25  /  AlkPhos  87  01-28    Hemoglobin A1C:     LIVER FUNCTIONS - ( 28 Jan 2018 06:50 )  Alb: 3.0 g/dL / Pro: 6.3 g/dL / ALK PHOS: 87 U/L / ALT: 25 U/L / AST: 19 U/L / GGT: x           Vitamin B12   PT/INR - ( 28 Jan 2018 06:50 )   PT: 13.8 sec;   INR: 1.26 ratio               RADIOLOGY    ANALYSIS AND PLAN:  This is a 72-year-old with a history of cerebrovascular accident, epilepsy, and now with a sigmoid mass that needs surgical intervention.    1.	For history of cerebrovascular accident with a history of dark melena, the patient needs to undergo surgical intervention.  The patient's antiplatelets need to be held, risks versus benefits have to be weighed.  2.	For history of epilepsy, continue the patient on his Keppra.  3.	From Neurology standpoint, the patient is cleared for any type of surgical intervention, but please recommend do not cause any drops in blood pressure.  If possible, keep the blood pressure around 120 and try to avoid any type of bradycardic episodes.  4.	Continue to monitor the patient's H and H.  5.	neurologic wise appears stable   6.	spoke to son in detail  in past he understands overall issues and risks   7.	no new events     Thank you for the courtesy of consultation.    Physical therapy evaluation as tolerated  OOB to chair/ambulation with assistance only if possible.    Greater than 15 minutes spent in direct patient care reviewing  the notes, lab data/ imaging , discussion with multidisciplinary team.

## 2018-01-29 NOTE — PROGRESS NOTE ADULT - ASSESSMENT
71yo M with PMH of CVA (2010) with subsequent left sided hemiplegia and balance difficulties, CAD s/p stents x2 (2010), CML (diagnosed in 2008 but has never been treated per patient), seizure disorder (has had 1 seizure in the past) presents with one episode of dark red blood per rectum. Patient states that he went to urinate earlier today and noted dark red blood come out from his "backside." He did not have a bowel movement, it was only blood. He has never experienced anything like this in the past. Up until today he has been in his usual state of health. Denies any fevers, chills, HA, dizziness, CP, palp, SOB, cough, abd pain, N/V/D, dysuria, hematuria.      - The pt's son contacted outpatient cardiologist Dr. Aden. Dr. Payne spoke with Dr. Aden. He states that the patient a few months ago was supposed to get a nuclear stress test and did not go for it. He was noted to have asymptomatic NSVT on a holter. At this time the patient has a large sigmoid mass which needs removal.  Currently he has no active cardiac conditions. No signs of ischemia, ADHF, clinical exam not consistent with stenotic valvular disease, no unstable arrhythmias noted. Doing any ischemic evaluation would not  given his clinical circumstances. Therefore able to proceed with this urgent colon surgery without any further cardiac workup. Routine hemodynamic monitoring is suggested during the procedure. Suggest to cont BB pre and post procedure. However, per surgery, will discharge pt today and have outpatient stress test on 1/31, then will follow up with GI outpatient to schedule surgery.  - H&H stable 14/46 no further bleeding during hospitalization as per pt  - Continue statin therapy for now  - Cont to hold antiplatelets for now. I would like to resume ASA if possible  - Hemodynamics stable as per flow sheet  - Monitor electrolytes.  Maintain K >4 Mg >2  - Other cardiac workup as indicated by clinical course  - Will follow 73yo M with PMH of CVA (2010) with subsequent left sided hemiplegia and balance difficulties, CAD s/p stents x2 (2010), CML (diagnosed in 2008 but has never been treated per patient), seizure disorder (has had 1 seizure in the past) presents with one episode of dark red blood per rectum. Patient states that he went to urinate earlier today and noted dark red blood come out from his "backside." He did not have a bowel movement, it was only blood. He has never experienced anything like this in the past. Up until today he has been in his usual state of health. Denies any fevers, chills, HA, dizziness, CP, palp, SOB, cough, abd pain, N/V/D, dysuria, hematuria.      - The pt's son contacted outpatient cardiologist Dr. Aden. Dr. Payne spoke with Dr. Aden. He states that the patient a few months ago was supposed to get a nuclear stress test and did not go for it. He was noted to have asymptomatic NSVT on a holter. At this time the patient has a large sigmoid mass which needs removal.  Currently he has no active cardiac conditions. No signs of ischemia, ADHF, clinical exam not consistent with stenotic valvular disease, no unstable arrhythmias noted. Doing any ischemic evaluation would not  given his clinical circumstances. Therefore able to proceed with this urgent colon surgery without any further cardiac workup. Routine hemodynamic monitoring is suggested during the procedure. Suggest to cont BB pre and post procedure. However, per surgery, will discharge pt today and have outpatient stress test on 1/31, then will follow up with GI outpatient to schedule surgery.  - H&H stable 14/46 no further bleeding during hospitalization as per pt  - Continue statin therapy for now  - Cont to hold antiplatelets for now. Continue with ASA  - Hemodynamics stable as per flow sheet  - Monitor electrolytes.  Maintain K >4 Mg >2  - Other cardiac workup as indicated by clinical course  - Will follow

## 2018-01-29 NOTE — PROGRESS NOTE ADULT - SUBJECTIVE AND OBJECTIVE BOX
Interval Events: Tolerating diet, admits to rectal bleeding with bms.     HPI:73yo M with PMH of CVA (2010) with subsequent left sided hemiplegia and balance difficulties, CAD s/p stents x2 (2010), CML (diagnosed in 2008 but has never been treated per patient), seizure disorder (has had 1 seizure in the past) presents with one episode of dark red blood per rectum. Patient states that he went to urinate earlier today and noted dark red blood come out from his "backside." He did not have a bowel movement, it was only blood. He has never experienced anything like this in the past. Up until today he has been in his usual state of health. Denies any fevers, chills, HA, dizziness, CP, palp, SOB, cough, abd pain, N/V/D, dysuria, hematuria.    MEDICATIONS  (STANDING):  aspirin enteric coated 81 milliGRAM(s) Oral daily  atorvastatin 40 milliGRAM(s) Oral at bedtime  cholecalciferol 1000 Unit(s) Oral daily  cyanocobalamin 1000 MICROGram(s) Oral daily  gabapentin 100 milliGRAM(s) Oral two times a day  levETIRAcetam 500 milliGRAM(s) Oral three times a day  metoprolol     tartrate 50 milliGRAM(s) Oral two times a day  neomycin 500 milliGRAM(s) Oral three times a day  pantoprazole  Injectable 40 milliGRAM(s) IV Push daily    MEDICATIONS  (PRN):      Allergies    No Known Allergies    Intolerances        Review of Systems:    General:  No wt loss, fevers, chills, night sweats,fatigue,   Eyes:  Good vision, no reported pain  ENT:  No sore throat, pain, runny nose, dysphagia  CV:  No pain, palpitations, hypo/hypertension  Resp:  No dyspnea, cough, tachypnea, wheezing  GI:  +BRBPR, No pain, No nausea, No vomiting, No diarrhea, No constipation, No weight loss, No fever, No pruritis, No rectal bleeding, No melena, No dysphagia  :  No pain, bleeding, incontinence, nocturia  Muscle:  No pain, weakness  Neuro:  No weakness, tingling, memory problems  Psych:  No fatigue, insomnia, mood problems, depression  Endocrine:  No polyuria, polydypsia, cold/heat intolerance  Heme:  No petechiae, ecchymosis, easy bruisability  Skin:  No rash, tattoos, scars, edema          Vital Signs Last 24 Hrs  T(C): 36.6 (29 Jan 2018 05:54), Max: 37.2 (28 Jan 2018 14:41)  T(F): 97.8 (29 Jan 2018 05:54), Max: 98.9 (28 Jan 2018 14:41)  HR: 70 (29 Jan 2018 05:54) (70 - 78)  BP: 116/62 (29 Jan 2018 05:54) (116/62 - 121/70)  BP(mean): --  RR: 18 (29 Jan 2018 05:54) (18 - 18)  SpO2: 95% (29 Jan 2018 05:54) (93% - 99%)    PHYSICAL EXAM:    Constitutional: NAD, well-developed  HEENT: EOMI, throat clear  Neck: No LAD, supple  Respiratory: CTA and P  Cardiovascular: S1 and S2, RRR, no M  Gastrointestinal: BS+, soft, NT/ND, neg HSM,  Extremities: No peripheral edema, neg clubing, cyanosis  Vascular: 2+ peripheral pulses  Neurological: A/O x 3, no focal deficits  Psychiatric: Normal mood, normal affect  Skin: No rashes      LABS:                        14.2   26.3  )-----------( 123      ( 28 Jan 2018 06:50 )             42.0     01-28    143  |  110<H>  |  12  ----------------------------<  97  3.8   |  24  |  0.72    Ca    8.8      28 Jan 2018 06:50    TPro  6.3  /  Alb  3.0<L>  /  TBili  0.5  /  DBili  x   /  AST  19  /  ALT  25  /  AlkPhos  87  01-28    PT/INR - ( 28 Jan 2018 06:50 )   PT: 13.8 sec;   INR: 1.26 ratio               RADIOLOGY & ADDITIONAL TESTS:

## 2018-01-29 NOTE — PROGRESS NOTE ADULT - ATTENDING COMMENTS
Agree with plan and exam as above with following: Colonoscopy showed large sigmoid mass. Surgery postponed.   Spoke to outpatient cardiologist Dr. Aden [phone] and Dr. Payne/Dr. Garibay cardiologist on board here at [facility]. Outpatient cardiologist Dr. Aden recommended Nuclear stress test, Discussed case with Dr. Payne/Dr Garibay, do not warrant further cardiac work up now would proceed with surgery. Patient and son hesitant. Spoke to surgery Dr Ambrose will hold off on surgery now.    Patient to follow up with Cardiologist Dr Aden for possible nuclear stress test, to follow up Wednesday. Needs follow up with Dr. Ambrose/Surgery shortly afterwards.    PT recommending AMOS.    Rest as above. Agree with plan and exam as above with following: Colonoscopy showed large sigmoid mass. Surgery postponed.   Spoke to outpatient cardiologist Dr. Aden [phone] and Dr. Payne/Dr. Garibay cardiologist on board here at [facility]. Outpatient cardiologist Dr. Aden recommended Nuclear stress test, Discussed case with Dr. Payne/Dr Garibay, do not warrant further cardiac work up now would proceed with surgery. Patient and son hesitant. Spoke to surgery Dr Ambrose will hold off on surgery now.    Patient to follow up with Cardiologist Dr Aden for possible nuclear stress test, to follow up Wednesday. Needs follow up with Dr. Ambrose/Surgery shortly afterwards.    PT recommending AMOS. Family and patient refuse AMOS. PT to reevaluate to send back to assisted living. Cleared by me if motor weakness is his baseline given his history of CVA.      Rest as above.

## 2018-01-29 NOTE — DIETITIAN INITIAL EVALUATION ADULT. - PROBLEM SELECTOR PLAN 1
GI bleed, possible upper given presentation with dark red blood per rectum. Patient appears to be hemodynamically stable, H/H wnl, no signs of acute bleed  -protonix 40mg IV, IVF NS@ 75cc/hr  -GI consult (Dr Yung)  -admit to [facility]  -NPO except meds  -f/u AM labs

## 2018-01-29 NOTE — DIETITIAN INITIAL EVALUATION ADULT. - PROBLEM SELECTOR PLAN 2
CT showed possible airspace disease at lung bases, given that patient lives at assisted living facility will treat for possible HCAP.  -continue zosyn and start vanco  -f/u blood cultures, MRSA nares, Legionella and strep Ag  -elevated lactate 2.3, repeat at 9pm

## 2018-01-29 NOTE — PROGRESS NOTE ADULT - SUBJECTIVE AND OBJECTIVE BOX
pt seen and examined  no complaints  small amount of bleeding in BM.    ICU Vital Signs Last 24 Hrs  T(C): 36.6 (29 Jan 2018 05:54), Max: 37.2 (28 Jan 2018 14:41)  T(F): 97.8 (29 Jan 2018 05:54), Max: 98.9 (28 Jan 2018 14:41)  HR: 70 (29 Jan 2018 05:54) (70 - 78)  BP: 116/62 (29 Jan 2018 05:54) (116/62 - 121/70)  BP(mean): --  ABP: --  ABP(mean): --  RR: 18 (29 Jan 2018 05:54) (18 - 18)  SpO2: 95% (29 Jan 2018 05:54) (93% - 99%)  gen- NAD  resp- Clear b/l  CVS-RRR  GI- abd, soft NT/ND, +BS  ext- nontender, -edema    73 yo with sigmoid mass.       d/c today      f/u with cardiology 1/31 for stress today      f/u in office for resection urgently      above D/W son, Cardiologist and hospitalist who agree

## 2018-01-30 VITALS
TEMPERATURE: 98 F | RESPIRATION RATE: 18 BRPM | HEART RATE: 56 BPM | SYSTOLIC BLOOD PRESSURE: 128 MMHG | DIASTOLIC BLOOD PRESSURE: 76 MMHG | OXYGEN SATURATION: 96 %

## 2018-01-30 DIAGNOSIS — Z71.89 OTHER SPECIFIED COUNSELING: ICD-10-CM

## 2018-01-30 PROCEDURE — 87086 URINE CULTURE/COLONY COUNT: CPT

## 2018-01-30 PROCEDURE — 87798 DETECT AGENT NOS DNA AMP: CPT

## 2018-01-30 PROCEDURE — 85730 THROMBOPLASTIN TIME PARTIAL: CPT

## 2018-01-30 PROCEDURE — 87486 CHLMYD PNEUM DNA AMP PROBE: CPT

## 2018-01-30 PROCEDURE — 71260 CT THORAX DX C+: CPT

## 2018-01-30 PROCEDURE — 87449 NOS EACH ORGANISM AG IA: CPT

## 2018-01-30 PROCEDURE — 96365 THER/PROPH/DIAG IV INF INIT: CPT

## 2018-01-30 PROCEDURE — 82378 CARCINOEMBRYONIC ANTIGEN: CPT

## 2018-01-30 PROCEDURE — 74177 CT ABD & PELVIS W/CONTRAST: CPT

## 2018-01-30 PROCEDURE — 81001 URINALYSIS AUTO W/SCOPE: CPT

## 2018-01-30 PROCEDURE — 84100 ASSAY OF PHOSPHORUS: CPT

## 2018-01-30 PROCEDURE — 97110 THERAPEUTIC EXERCISES: CPT

## 2018-01-30 PROCEDURE — 88305 TISSUE EXAM BY PATHOLOGIST: CPT

## 2018-01-30 PROCEDURE — 87641 MR-STAPH DNA AMP PROBE: CPT

## 2018-01-30 PROCEDURE — 85027 COMPLETE CBC AUTOMATED: CPT

## 2018-01-30 PROCEDURE — 99232 SBSQ HOSP IP/OBS MODERATE 35: CPT

## 2018-01-30 PROCEDURE — 99239 HOSP IP/OBS DSCHRG MGMT >30: CPT

## 2018-01-30 PROCEDURE — 87040 BLOOD CULTURE FOR BACTERIA: CPT

## 2018-01-30 PROCEDURE — 96367 TX/PROPH/DG ADDL SEQ IV INF: CPT

## 2018-01-30 PROCEDURE — 86900 BLOOD TYPING SEROLOGIC ABO: CPT

## 2018-01-30 PROCEDURE — 93005 ELECTROCARDIOGRAM TRACING: CPT

## 2018-01-30 PROCEDURE — 86850 RBC ANTIBODY SCREEN: CPT

## 2018-01-30 PROCEDURE — 93306 TTE W/DOPPLER COMPLETE: CPT

## 2018-01-30 PROCEDURE — 87581 M.PNEUMON DNA AMP PROBE: CPT

## 2018-01-30 PROCEDURE — 36415 COLL VENOUS BLD VENIPUNCTURE: CPT

## 2018-01-30 PROCEDURE — 87640 STAPH A DNA AMP PROBE: CPT

## 2018-01-30 PROCEDURE — 83735 ASSAY OF MAGNESIUM: CPT

## 2018-01-30 PROCEDURE — 97116 GAIT TRAINING THERAPY: CPT

## 2018-01-30 PROCEDURE — 87633 RESP VIRUS 12-25 TARGETS: CPT

## 2018-01-30 PROCEDURE — 80053 COMPREHEN METABOLIC PANEL: CPT

## 2018-01-30 PROCEDURE — 87899 AGENT NOS ASSAY W/OPTIC: CPT

## 2018-01-30 PROCEDURE — 84145 PROCALCITONIN (PCT): CPT

## 2018-01-30 PROCEDURE — 86901 BLOOD TYPING SEROLOGIC RH(D): CPT

## 2018-01-30 PROCEDURE — 82272 OCCULT BLD FECES 1-3 TESTS: CPT

## 2018-01-30 PROCEDURE — 97161 PT EVAL LOW COMPLEX 20 MIN: CPT

## 2018-01-30 PROCEDURE — 80202 ASSAY OF VANCOMYCIN: CPT

## 2018-01-30 PROCEDURE — 97530 THERAPEUTIC ACTIVITIES: CPT

## 2018-01-30 PROCEDURE — 80048 BASIC METABOLIC PNL TOTAL CA: CPT

## 2018-01-30 PROCEDURE — 83605 ASSAY OF LACTIC ACID: CPT

## 2018-01-30 PROCEDURE — 85610 PROTHROMBIN TIME: CPT

## 2018-01-30 PROCEDURE — 99285 EMERGENCY DEPT VISIT HI MDM: CPT | Mod: 25

## 2018-01-30 RX ORDER — METOPROLOL TARTRATE 50 MG
1 TABLET ORAL
Qty: 0 | Refills: 0 | COMMUNITY
Start: 2018-01-30

## 2018-01-30 RX ORDER — METOPROLOL TARTRATE 50 MG
1 TABLET ORAL
Qty: 0 | Refills: 0 | COMMUNITY

## 2018-01-30 RX ORDER — METOPROLOL TARTRATE 50 MG
2 TABLET ORAL
Qty: 0 | Refills: 0 | COMMUNITY

## 2018-01-30 RX ORDER — PANTOPRAZOLE SODIUM 20 MG/1
1 TABLET, DELAYED RELEASE ORAL
Qty: 30 | Refills: 0 | OUTPATIENT
Start: 2018-01-30 | End: 2018-02-28

## 2018-01-30 RX ORDER — PANTOPRAZOLE SODIUM 20 MG/1
1 TABLET, DELAYED RELEASE ORAL
Qty: 0 | Refills: 0 | COMMUNITY
Start: 2018-01-30

## 2018-01-30 RX ORDER — PANTOPRAZOLE SODIUM 20 MG/1
40 TABLET, DELAYED RELEASE ORAL
Qty: 0 | Refills: 0 | Status: DISCONTINUED | OUTPATIENT
Start: 2018-01-30 | End: 2018-01-30

## 2018-01-30 RX ADMIN — Medication 81 MILLIGRAM(S): at 11:19

## 2018-01-30 RX ADMIN — LEVETIRACETAM 500 MILLIGRAM(S): 250 TABLET, FILM COATED ORAL at 06:37

## 2018-01-30 RX ADMIN — Medication 1000 UNIT(S): at 11:18

## 2018-01-30 RX ADMIN — LEVETIRACETAM 500 MILLIGRAM(S): 250 TABLET, FILM COATED ORAL at 13:36

## 2018-01-30 RX ADMIN — PREGABALIN 1000 MICROGRAM(S): 225 CAPSULE ORAL at 11:19

## 2018-01-30 RX ADMIN — PANTOPRAZOLE SODIUM 40 MILLIGRAM(S): 20 TABLET, DELAYED RELEASE ORAL at 11:18

## 2018-01-30 RX ADMIN — Medication 50 MILLIGRAM(S): at 06:37

## 2018-01-30 RX ADMIN — GABAPENTIN 100 MILLIGRAM(S): 400 CAPSULE ORAL at 06:37

## 2018-01-30 NOTE — PROGRESS NOTE ADULT - PROBLEM SELECTOR PROBLEM 8
Need for prophylactic measure
Need for prophylactic measure
CLL (chronic lymphocytic leukemia)

## 2018-01-30 NOTE — PROGRESS NOTE ADULT - ATTENDING COMMENTS
Agree with plan and exam as above with following: Colonoscopy showed large sigmoid mass. Surgery postponed.   Spoke to outpatient cardiologist Dr. Aden [phone] and Dr. Payne/Dr. Garibay cardiologist on board here at [facility]. Outpatient cardiologist Dr. Aden recommended Nuclear stress test, Discussed case with Dr. Payne/Dr Garibay.    Patient to follow up with Cardiologist Dr Aden on Wednesday for outpatient nuclear stress test.   Needs follow up with Dr. Ambrose/Surgery shortly afterwards.    PT recommending AMOS. Family and patient refuse AMOS.   PT to be discharged back to assisted living.       Rest as above.

## 2018-01-30 NOTE — PROGRESS NOTE ADULT - NSHPATTENDINGPLANDISCUSS_GEN_ALL_CORE
patient, son [phone], Dr. Aden [phone], Dr Payne cardio, nurse, Dr Mcdowell
team
Dr. Hsieh onco/hemat, Dr. Palomares GI
Liz BUCHANAN
patient, son [phone], Dr. Aden [phone], Dr Garibay cardio, nurse, Dr Ambrose
patient, son [phone], Dr. Aden [phone], Dr Garibay cardio, nurse, Dr Ambrose
patient, son [phone], Dr. Aden [phone], Dr Payne cardio, nurse, Dr Mcdowell
joselin Pacheco
Dr. Ambrose Gen surg, left message to son Junior for call back

## 2018-01-30 NOTE — PROGRESS NOTE ADULT - SUBJECTIVE AND OBJECTIVE BOX
pt seen  no complaints  denies rectal bleeding  ICU Vital Signs Last 24 Hrs  T(C): 36.6 (30 Jan 2018 04:51), Max: 37.2 (29 Jan 2018 13:05)  T(F): 97.9 (30 Jan 2018 04:51), Max: 98.9 (29 Jan 2018 13:05)  HR: 86 (30 Jan 2018 06:35) (60 - 86)  BP: 134/75 (30 Jan 2018 04:51) (100/57 - 134/75)  BP(mean): --  ABP: --  ABP(mean): --  RR: 17 (30 Jan 2018 04:51) (17 - 18)  SpO2: 93% (30 Jan 2018 04:51) (92% - 93%)  NAD  cTa b/l  rrr  soft N/T/ND

## 2018-01-30 NOTE — PROGRESS NOTE ADULT - SUBJECTIVE AND OBJECTIVE BOX
All interim records and events noted.    up in chair  reports going home and will have stress test done w own cardiologist before definitive colon surgery  no rectal bleed/blood w stool/melena.      MEDICATIONS  (STANDING):  aspirin enteric coated 81 milliGRAM(s) Oral daily  atorvastatin 40 milliGRAM(s) Oral at bedtime  cholecalciferol 1000 Unit(s) Oral daily  cyanocobalamin 1000 MICROGram(s) Oral daily  gabapentin 100 milliGRAM(s) Oral two times a day  levETIRAcetam 500 milliGRAM(s) Oral three times a day  metoprolol     tartrate 50 milliGRAM(s) Oral two times a day  pantoprazole  Injectable 40 milliGRAM(s) IV Push daily    MEDICATIONS  (PRN):      Vital Signs Last 24 Hrs  T(C): 36.6 (30 Jan 2018 04:51), Max: 37.2 (29 Jan 2018 13:05)  T(F): 97.9 (30 Jan 2018 04:51), Max: 98.9 (29 Jan 2018 13:05)  HR: 86 (30 Jan 2018 06:35) (60 - 86)  BP: 134/75 (30 Jan 2018 04:51) (100/57 - 134/75)  BP(mean): --  RR: 17 (30 Jan 2018 04:51) (17 - 18)  SpO2: 93% (30 Jan 2018 04:51) (92% - 93%)    PHYSICAL EXAM  General: well developed  well nourished, in no acute distress  Head: atraumatic, normocephalic  ENT: sclera anicteric, buccal mucosa moist  Neck: supple  CV: S1 S2, regular rate and rhythm  Lungs: clear to auscultation, no wheezes/rhonchi  Abdomen: soft, nontender, bowel sounds present, pouchy  Skin: no significant increased ecchymosis/petechiae  Neuro: alert and oriented X3,  no focal deficits      LABS:             14.2   26.3  )-----------( 123      ( 01-28 @ 06:50 )             42.0             01-28 @ 06:50  PT13.8 INR1.26  PTT--      RADIOLOGY & ADDITIONAL STUDIES:    IMPRESSION/RECOMMENDATIONS:

## 2018-01-30 NOTE — PROGRESS NOTE ADULT - PROBLEM SELECTOR PROBLEM 10
Preop examination
Need for prophylactic measure

## 2018-01-30 NOTE — PROGRESS NOTE ADULT - ASSESSMENT
73yo M with PMH of CVA (2010) with subsequent left sided hemiplegia and balance difficulties, CAD s/p stents x2 (2010), CML (diagnosed in 2008 but has never been treated per patient), seizure disorder (has had 1 seizure in the past) presents with one episode of dark red blood per rectum. Patient states that he went to urinate earlier today and noted dark red blood come out from his "backside." He did not have a bowel movement, it was only blood. He has never experienced anything like this in the past. Up until today he has been in his usual state of health. Denies any fevers, chills, HA, dizziness, CP, palp, SOB, cough, abd pain, N/V/D, dysuria, hematuria.      - The pt's son contacted outpatient cardiologist Dr. Aden. Dr. Payne spoke with Dr. Aden. He states that the patient a few months ago was supposed to get a nuclear stress test and did not go for it. He was noted to have asymptomatic NSVT on a holter. At this time the patient has a large sigmoid mass which needs removal.  Currently he has no active cardiac conditions. No signs of ischemia, ADHF, clinical exam not consistent with stenotic valvular disease, no unstable arrhythmias noted. Doing any ischemic evaluation would not  given his clinical circumstances. Therefore able to proceed with this urgent colon surgery without any further cardiac workup. Routine hemodynamic monitoring is suggested during the procedure. Suggest to cont BB pre and post procedure. However, per surgery, will discharge pt and have outpatient stress test with outpatient cardiologist on 1/31, then will follow up with GI outpatient to schedule surgery.  - H&H stable 14/46 no further bleeding during hospitalization as per pt  - Continue statin therapy for now  - Cont to hold antiplatelets for now. Continue with ASA  - Hemodynamics stable as per flow sheet  - Monitor electrolytes.  Maintain K >4 Mg >2  - Other cardiac workup as indicated by clinical course  - Will follow

## 2018-01-30 NOTE — PROGRESS NOTE ADULT - PROBLEM SELECTOR PLAN 7
continue atorvastatin 40mg daily
continue atorvastatin 40mg daily
continue keppra 500mg TID

## 2018-01-30 NOTE — PROGRESS NOTE ADULT - SUBJECTIVE AND OBJECTIVE BOX
Neurology follow up note    HECTOR PALADINPALADINO72yMale      Interval History:    Patient feels ok no new complaints.    MEDICATIONS    aspirin enteric coated 81 milliGRAM(s) Oral daily  atorvastatin 40 milliGRAM(s) Oral at bedtime  cholecalciferol 1000 Unit(s) Oral daily  cyanocobalamin 1000 MICROGram(s) Oral daily  gabapentin 100 milliGRAM(s) Oral two times a day  levETIRAcetam 500 milliGRAM(s) Oral three times a day  metoprolol     tartrate 50 milliGRAM(s) Oral two times a day  pantoprazole    Tablet 40 milliGRAM(s) Oral before breakfast      Allergies    No Known Allergies    Intolerances            Vital Signs Last 24 Hrs  T(C): 36.6 (30 Jan 2018 13:18), Max: 36.6 (29 Jan 2018 20:00)  T(F): 97.8 (30 Jan 2018 13:18), Max: 97.9 (30 Jan 2018 04:51)  HR: 56 (30 Jan 2018 13:18) (56 - 86)  BP: 128/76 (30 Jan 2018 13:18) (100/57 - 134/75)  BP(mean): --  RR: 18 (30 Jan 2018 13:18) (17 - 18)  SpO2: 96% (30 Jan 2018 13:18) (92% - 96%)        REVIEW OF SYSTEMS:    Constitutional: No fever, chills, fatigue, weakness  Eyes: no eye pain, visual disturbances, or discharge  ENT:  No difficulty hearing, tinnitus, vertigo; No sinus or throat pain  Neck: No pain or stiffness  Respiratory: No cough, dyspnea, wheezing   Cardiovascular: No chest pain, palpitations,   Gastrointestinal: No abdominal or epigastric pain. No nausea, vomiting  No diarrhea or constipation.   Genitourinary: No dysuria, frequency, hematuria or incontinence  Neurological: No headaches, lightheadedness, vertigo, numbness or tremors  Psychiatric: No depression, anxiety, mood swings or difficulty sleeping  Musculoskeletal: No joint pain or swelling; No muscle, back or extremity pain  Skin: No itching, burning, rashes or lesions   Lymph Nodes: No enlarged glands  Endocrine: No heat or cold intolerance; No hair loss   Allergy and Immunologic: No hives or eczema    On Neurological Examination:    Mental Status -The patient is awake and alert.      Extraocular movements were intact.      Pupils equal, round, and reactive bilaterally, 3 mm to 2.      Speech was fluent.      Smile symmetric.      Motor, right upper, right lower was 4+/5, left upper arm was in a flexed position, had a sling.  There is subtle flexion extension at the elbow and slight movement of the thumb, first and second digits.  Left lower extremity, subtle movement at the hip and knee, as per patient this is his baseline motor status.      Sensory:  The patient has decreased light touch to left arm, left leg, as per the patient this is baseline sensory status.      Follow simple commands      GENERAL Exam: Nontoxic , No Acute Distress   	  HEENT:  normocephalic, atraumatic  		  LUNGS: Decreased bilaterally  	  HEART: Normal S1S2   No murmur RRR        	  GI/ ABDOMEN:  Soft  Non tender    EXTREMITIES:   No Edema  No Clubbing  No Cyanosis No Edema  	   SKIN: Normal  No Ecchymosis                LABS:            Hemoglobin A1C:       Vitamin B12         RADIOLOGY      ANALYSIS AND PLAN:  This is a 72-year-old with a history of cerebrovascular accident, epilepsy, and now with a sigmoid mass that needs surgical intervention.    1.	For history of cerebrovascular accident resume home medications when possible    2.	For history of epilepsy, continue the patient on his Keppra.  3.	From Neurology standpoint, the patient is cleared for any type of surgical intervention, but please recommend do not cause any drops in blood pressure.  If possible, keep the blood pressure around 120 and try to avoid any type of bradycardic episodes.  4.	Continue to monitor the patient's H and H.  5.	neurologic wise appears stable   6.	spoke to son in detail  in past he understands overall issues and risks   7.	no new events     Thank you for the courtesy of consultation.    Physical therapy evaluation as tolerated  OOB to chair/ambulation with assistance only if possible.    Greater than 15 minutes spent in direct patient care reviewing  the notes, lab data/ imaging , discussion with multidisciplinary team.

## 2018-01-30 NOTE — PROGRESS NOTE ADULT - PROBLEM SELECTOR PROBLEM 9
CLL (chronic lymphocytic leukemia)
CLL (chronic lymphocytic leukemia)
PVD (peripheral vascular disease)
Need for prophylactic measure
Need for prophylactic measure
PVD (peripheral vascular disease)

## 2018-01-30 NOTE — PROGRESS NOTE ADULT - PROBLEM SELECTOR PLAN 2
Advanced care planning was discussed with patient and family.  Advanced care planning forms were reviewed and discussed.  Risks, benefits and alternatives of gastroenterologic procedures were discussed in detail and all questions were answered.    25 minutes spent.

## 2018-01-30 NOTE — PROGRESS NOTE ADULT - PROBLEM SELECTOR PLAN 1
s/p colonoscopy with large sigmoid mass--bx revealing adenoma w area of high grade dysplasia.   ppi once a day  oncology on case  surgery on case-- plan for lap sigmoidectomy as outpatient after nuclear stress test

## 2018-01-30 NOTE — PROGRESS NOTE ADULT - PROBLEM SELECTOR PROBLEM 6
Seizure syndrome
Seizure syndrome
HTN (hypertension)
Self
HTN (hypertension)

## 2018-01-30 NOTE — PROGRESS NOTE ADULT - PROBLEM SELECTOR PROBLEM 1
Lower gastrointestinal bleed
Mass of colon
GI bleed
Mass of colon
GI bleed

## 2018-01-30 NOTE — PROGRESS NOTE ADULT - PROBLEM SELECTOR PROBLEM 5
HTN (hypertension)
CAD (coronary artery disease)
HTN (hypertension)

## 2018-01-30 NOTE — PROGRESS NOTE ADULT - PROBLEM SELECTOR PLAN 10
RVP negative, no need at this time for flu ppx  VTE ppx: SCDs, no pharmacologic VTE ppx in setting of possible GI bleed    IMPROVE VTE Individual Risk Assessment          RISK                                                          Points  [  ] Previous VTE                                                3  [  ] Thrombophilia                                             2  [ 2 ] Lower limb paralysis                                   2        (unable to hold up >15 seconds)    [ 2 ] Current Cancer                                             2         (within 6 months)  [  ] Immobilization > 24 hrs                              1  [  ] ICU/CCU stay > 24 hours                             1  [ 1 ] Age > 60                                                         1    IMPROVE VTE Score: 5
pt is deemed a low to intermediate risk candidate for a low risk procedure, pt is medically optimized, understands benefits vs risks
RVP negative, no need at this time for flu ppx  VTE ppx: SCDs    IMPROVE VTE Individual Risk Assessment          RISK                                                          Points  [  ] Previous VTE                                                3  [  ] Thrombophilia                                             2  [ 2 ] Lower limb paralysis                                   2        (unable to hold up >15 seconds)    [ 2 ] Current Cancer                                             2         (within 6 months)  [  ] Immobilization > 24 hrs                              1  [  ] ICU/CCU stay > 24 hours                             1  [ 1 ] Age > 60                                                         1    IMPROVE VTE Score: 5
RVP negative, no need at this time for flu ppx  VTE ppx: SCDs    IMPROVE VTE Individual Risk Assessment          RISK                                                          Points  [  ] Previous VTE                                                3  [  ] Thrombophilia                                             2  [ 2 ] Lower limb paralysis                                   2        (unable to hold up >15 seconds)    [ 2 ] Current Cancer                                             2         (within 6 months)  [  ] Immobilization > 24 hrs                              1  [  ] ICU/CCU stay > 24 hours                             1  [ 1 ] Age > 60                                                         1    IMPROVE VTE Score: 5
RVP negative, no need at this time for flu ppx  VTE ppx: SCDs, no pharmacologic VTE ppx in setting of possible GI bleed    IMPROVE VTE Individual Risk Assessment          RISK                                                          Points  [  ] Previous VTE                                                3  [  ] Thrombophilia                                             2  [ 2 ] Lower limb paralysis                                   2        (unable to hold up >15 seconds)    [ 2 ] Current Cancer                                             2         (within 6 months)  [  ] Immobilization > 24 hrs                              1  [  ] ICU/CCU stay > 24 hours                             1  [ 1 ] Age > 60                                                         1    IMPROVE VTE Score: 5

## 2018-01-30 NOTE — PROGRESS NOTE ADULT - PROBLEM SELECTOR PROBLEM 7
HLD (hyperlipidemia)
HLD (hyperlipidemia)
Seizure syndrome

## 2018-01-30 NOTE — PROGRESS NOTE ADULT - PROBLEM SELECTOR PLAN 1
Colonoscopy showed large sigmoid mass. Surgery postponed.   -follow up biopsy showing tubular adenoma- high grade dysplasia  -CEA 3.2 (wnl)  -Oncology- Dr. Hsieh following signed off as patient biopsy negative, no plan for colon mass resection at this time.  -Cardio-Dr. Payne- [facility]  -Spoke to Dr. Aden [phone] and appears to be concerned about upcoming clearance for surgery. As per Dr. Aden patient has strong family history of premature cardiac death/ CAD s/p stent to RCA/circumflex, had a holter done recently showing NSVT. Discussed case with Dr. Payne/Dr Garibay, do not warrant further cardiac work up now would proceed with surgery. Patient and son hesitant will hold off on surgery now, spoke to Surgery Dr. Ambrose.  -Discharge patient to [facility], Patient's family and cardiologist requesting further cardiac work up before surgery to be done by Dr. Aden outpatient cardiologist.

## 2018-01-30 NOTE — PROGRESS NOTE ADULT - PROBLEM SELECTOR PROBLEM 3
CVA (cerebral vascular accident)
HCAP (healthcare-associated pneumonia)
CVA (cerebral vascular accident)

## 2018-01-30 NOTE — PROGRESS NOTE ADULT - SUBJECTIVE AND OBJECTIVE BOX
Interval Events: No new overnight event.  No N/V/D.  Tolerating diet.    HPI:73yo M with PMH of CVA (2010) with subsequent left sided hemiplegia and balance difficulties, CAD s/p stents x2 (2010), CML (diagnosed in 2008 but has never been treated per patient), seizure disorder (has had 1 seizure in the past) presents with one episode of dark red blood per rectum. Patient states that he went to urinate earlier today and noted dark red blood come out from his "backside." He did not have a bowel movement, it was only blood. He has never experienced anything like this in the past. Up until today he has been in his usual state of health. Denies any fevers, chills, HA, dizziness, CP, palp, SOB, cough, abd pain, N/V/D, dysuria, hematuria.    MEDICATIONS  (STANDING):  aspirin enteric coated 81 milliGRAM(s) Oral daily  atorvastatin 40 milliGRAM(s) Oral at bedtime  cholecalciferol 1000 Unit(s) Oral daily  cyanocobalamin 1000 MICROGram(s) Oral daily  gabapentin 100 milliGRAM(s) Oral two times a day  levETIRAcetam 500 milliGRAM(s) Oral three times a day  metoprolol     tartrate 50 milliGRAM(s) Oral two times a day  pantoprazole  Injectable 40 milliGRAM(s) IV Push daily    MEDICATIONS  (PRN):      Allergies    No Known Allergies    Intolerances        Review of Systems:    General:  No wt loss, fevers, chills, night sweats,fatigue,   Eyes:  Good vision, no reported pain  ENT:  No sore throat, pain, runny nose, dysphagia  CV:  No pain, palpitations, hypo/hypertension  Resp:  No dyspnea, cough, tachypnea, wheezing  GI:  No pain, No nausea, No vomiting, No diarrhea, No constipation, No weight loss, No fever, No pruritis, No rectal bleeding, No melena, No dysphagia  :  No pain, bleeding, incontinence, nocturia  Muscle:  No pain, weakness  Neuro:  No weakness, tingling, memory problems  Psych:  No fatigue, insomnia, mood problems, depression  Endocrine:  No polyuria, polydypsia, cold/heat intolerance  Heme:  No petechiae, ecchymosis, easy bruisability  Skin:  No rash, tattoos, scars, edema      Vital Signs Last 24 Hrs  T(C): 36.6 (30 Jan 2018 04:51), Max: 37.2 (29 Jan 2018 13:05)  T(F): 97.9 (30 Jan 2018 04:51), Max: 98.9 (29 Jan 2018 13:05)  HR: 86 (30 Jan 2018 06:35) (60 - 86)  BP: 134/75 (30 Jan 2018 04:51) (100/57 - 134/75)  BP(mean): --  RR: 17 (30 Jan 2018 04:51) (17 - 18)  SpO2: 93% (30 Jan 2018 04:51) (92% - 93%)    PHYSICAL EXAM:    Constitutional: NAD, well-developed  HEENT: EOMI, throat clear  Neck: No LAD, supple  Respiratory: CTA and P  Cardiovascular: S1 and S2, RRR, no M  Gastrointestinal: BS+, soft, NT/ND, neg HSM,  Extremities: No peripheral edema, neg clubing, cyanosis  Vascular: 2+ peripheral pulses  Neurological: A/O x 3, no focal deficits  Psychiatric: Normal mood, normal affect  Skin: No rashes      LABS:                RADIOLOGY & ADDITIONAL TESTS:

## 2018-01-30 NOTE — PROGRESS NOTE ADULT - ASSESSMENT
71 y/o man w hx CVA,HTN, HLD, presents to ER w 2 episode of blood per rectum. Notes on CBC with leukocytosis  On admission WBC ~24k, Hgb normal, platelets 130-140k.  Pt reports dx'd "leukemia"while in [place] ~2010, followed by Oncologist there every few months, post bone marrow exam, never needing treatment and usual WBC ~25k. Lab and review of raciel blood smear during this hospitalization c/w Chronic Lymphocytic Leukemia. Stable , no acute intervention needed    post colonoscopy w colon mass, pathology sig for adenoma and area of high grade dysplasia    -CLL-stable, no acute heme intervention  -GI bleed w colonoscopy showing adenoma w area of high grade dysplasia- agree w resection and definitive surgery, to r/o malignant component. More definitive Oncological recommendation pending final pathology from surgery. Signing off for now due to surgery on hold pending additional cardiology w/u as outpatient, not because "biopsy is negative". will f/u as outpatient pending final pathology.    thank you, please call if any questions.

## 2018-01-30 NOTE — PROGRESS NOTE ADULT - PROBLEM SELECTOR PLAN 6
continue keppra 500mg TID
continue keppra 500mg TID
continue metoprolol 25mg BID

## 2018-01-30 NOTE — PROGRESS NOTE ADULT - PROBLEM SELECTOR PLAN 4
will hold ASA 81mg and Plavix 75mg at this time in setting of possible GI bleed
will continue ASA 81mg and hold Plavix 75mg at this time
will continue ASA 81mg and hold Plavix 75mg at this time
will hold ASA 81mg and Plavix 75mg at this time in setting of possible GI bleed

## 2018-01-30 NOTE — PROGRESS NOTE ADULT - PROBLEM SELECTOR PROBLEM 2
HCAP (healthcare-associated pneumonia)
Advance care planning
GI bleed
HCAP (healthcare-associated pneumonia)
GI bleed

## 2018-01-30 NOTE — PROGRESS NOTE ADULT - PROBLEM SELECTOR PROBLEM 4
CAD (coronary artery disease)
CVA (cerebral vascular accident)
CAD (coronary artery disease)

## 2018-01-30 NOTE — PROGRESS NOTE ADULT - SUBJECTIVE AND OBJECTIVE BOX
[facility] -- Phil Hutchison, Lani, Magaly, Elmer Ty Savella  Office # [phone]      Follow Up:  CVA, HTN    Subjective/Observations: Patient seen and examined. Events noted. Resting comfortably in bed. No complaints of chest pain, dyspnea, or palpitations reported. No signs of orthopnea or PND.       REVIEW OF SYSTEMS: All other review of systems is negative unless indicated above    PAST MEDICAL & SURGICAL HISTORY:  CML (chronic myelocytic leukemia)  Seizure syndrome  HTN (hypertension)  CVA (cerebral vascular accident)  HLD (hyperlipidemia)  CAD (coronary artery disease)  S/P coronary artery stent placement  H/O major orthopedic surgery: left ankle  S/P cholecystectomy      MEDICATIONS  (STANDING):  aspirin enteric coated 81 milliGRAM(s) Oral daily  atorvastatin 40 milliGRAM(s) Oral at bedtime  cholecalciferol 1000 Unit(s) Oral daily  cyanocobalamin 1000 MICROGram(s) Oral daily  gabapentin 100 milliGRAM(s) Oral two times a day  levETIRAcetam 500 milliGRAM(s) Oral three times a day  metoprolol     tartrate 50 milliGRAM(s) Oral two times a day  pantoprazole    Tablet 40 milliGRAM(s) Oral before breakfast    MEDICATIONS  (PRN):      Allergies    No Known Allergies    Intolerances            Vital Signs Last 24 Hrs  T(C): 36.6 (30 Jan 2018 04:51), Max: 37.2 (29 Jan 2018 13:05)  T(F): 97.9 (30 Jan 2018 04:51), Max: 98.9 (29 Jan 2018 13:05)  HR: 86 (30 Jan 2018 06:35) (60 - 86)  BP: 134/75 (30 Jan 2018 04:51) (100/57 - 134/75)  BP(mean): --  RR: 17 (30 Jan 2018 04:51) (17 - 18)  SpO2: 93% (30 Jan 2018 04:51) (92% - 93%)    I&O's Summary        PHYSICAL EXAM:  Constitutional: NAD, awake and alert, well-developed  HEENT: Moist Mucous Membranes, Anicteric  Pulmonary: Decreased breath sounds b/l. No rales, crackles or wheeze appreciated.   Cardiovascular: Regular, S1 and S2, No murmurs, rubs, gallops or clicks  Gastrointestinal: Bowel Sounds present, soft, nontender.   Lymph: trace dependent peripheral edema. No lymphadenopathy.  Skin: No visible rashes or ulcers.  Psych:  Mood & affect appropriate    LABS: All Labs Reviewed:                        14.2   26.3  )-----------( 123      ( 28 Jan 2018 06:50 )             42.0     28 Jan 2018 06:50    143    |  110    |  12     ----------------------------<  97     3.8     |  24     |  0.72     Ca    8.8        28 Jan 2018 06:50    TPro  6.3    /  Alb  3.0    /  TBili  0.5    /  DBili  x      /  AST  19     /  ALT  25     /  AlkPhos  87     28 Jan 2018 06:50

## 2018-01-30 NOTE — PROGRESS NOTE ADULT - ASSESSMENT
71yo M with PMH of CVA (2010) with subsequent left sided hemiplegia and balance difficulties, CAD s/p stents x2 (2010), CLL (diagnosed in 2008 but has never been treated per patient), seizure disorder (has had 1 seizure in the past), PVD s/p iliac artery stent, presents with one episode of dark red blood per rectum admitted for lower GI bleed with finding of sigmoid mass. Surgery postponed. Awaiting [facility] or home w/PT.

## 2018-02-02 ENCOUNTER — OUTPATIENT (OUTPATIENT)
Dept: OUTPATIENT SERVICES | Facility: HOSPITAL | Age: 73
LOS: 1 days | End: 2018-02-02
Payer: MEDICARE

## 2018-02-02 VITALS
TEMPERATURE: 98 F | SYSTOLIC BLOOD PRESSURE: 122 MMHG | HEART RATE: 62 BPM | RESPIRATION RATE: 18 BRPM | WEIGHT: 179.02 LBS | DIASTOLIC BLOOD PRESSURE: 58 MMHG | HEIGHT: 67 IN

## 2018-02-02 DIAGNOSIS — Z90.49 ACQUIRED ABSENCE OF OTHER SPECIFIED PARTS OF DIGESTIVE TRACT: Chronic | ICD-10-CM

## 2018-02-02 DIAGNOSIS — C18.7 MALIGNANT NEOPLASM OF SIGMOID COLON: ICD-10-CM

## 2018-02-02 DIAGNOSIS — Z95.5 PRESENCE OF CORONARY ANGIOPLASTY IMPLANT AND GRAFT: Chronic | ICD-10-CM

## 2018-02-02 DIAGNOSIS — Z98.890 OTHER SPECIFIED POSTPROCEDURAL STATES: Chronic | ICD-10-CM

## 2018-02-02 DIAGNOSIS — Z01.818 ENCOUNTER FOR OTHER PREPROCEDURAL EXAMINATION: ICD-10-CM

## 2018-02-02 LAB
ALBUMIN SERPL ELPH-MCNC: 3.7 G/DL — SIGNIFICANT CHANGE UP (ref 3.3–5)
ALP SERPL-CCNC: 101 U/L — SIGNIFICANT CHANGE UP (ref 40–120)
ALT FLD-CCNC: 22 U/L — SIGNIFICANT CHANGE UP (ref 12–78)
ANION GAP SERPL CALC-SCNC: 6 MMOL/L — SIGNIFICANT CHANGE UP (ref 5–17)
AST SERPL-CCNC: 18 U/L — SIGNIFICANT CHANGE UP (ref 15–37)
BILIRUB SERPL-MCNC: 0.6 MG/DL — SIGNIFICANT CHANGE UP (ref 0.2–1.2)
BUN SERPL-MCNC: 13 MG/DL — SIGNIFICANT CHANGE UP (ref 7–23)
CALCIUM SERPL-MCNC: 8.8 MG/DL — SIGNIFICANT CHANGE UP (ref 8.5–10.1)
CHLORIDE SERPL-SCNC: 108 MMOL/L — SIGNIFICANT CHANGE UP (ref 96–108)
CO2 SERPL-SCNC: 26 MMOL/L — SIGNIFICANT CHANGE UP (ref 22–31)
CREAT SERPL-MCNC: 0.72 MG/DL — SIGNIFICANT CHANGE UP (ref 0.5–1.3)
GLUCOSE SERPL-MCNC: 104 MG/DL — HIGH (ref 70–99)
HCT VFR BLD CALC: 46.9 % — SIGNIFICANT CHANGE UP (ref 39–50)
HGB BLD-MCNC: 14.5 G/DL — SIGNIFICANT CHANGE UP (ref 13–17)
MCHC RBC-ENTMCNC: 27.9 PG — SIGNIFICANT CHANGE UP (ref 27–34)
MCHC RBC-ENTMCNC: 31 GM/DL — LOW (ref 32–36)
MCV RBC AUTO: 90.1 FL — SIGNIFICANT CHANGE UP (ref 80–100)
PLATELET # BLD AUTO: 132 K/UL — LOW (ref 150–400)
POTASSIUM SERPL-MCNC: 4 MMOL/L — SIGNIFICANT CHANGE UP (ref 3.5–5.3)
POTASSIUM SERPL-SCNC: 4 MMOL/L — SIGNIFICANT CHANGE UP (ref 3.5–5.3)
PROT SERPL-MCNC: 6.9 G/DL — SIGNIFICANT CHANGE UP (ref 6–8.3)
RBC # BLD: 5.2 M/UL — SIGNIFICANT CHANGE UP (ref 4.2–5.8)
RBC # FLD: 13.2 % — SIGNIFICANT CHANGE UP (ref 10.3–14.5)
SODIUM SERPL-SCNC: 140 MMOL/L — SIGNIFICANT CHANGE UP (ref 135–145)
WBC # BLD: 29.7 K/UL — HIGH (ref 3.8–10.5)
WBC # FLD AUTO: 29.7 K/UL — HIGH (ref 3.8–10.5)

## 2018-02-02 PROCEDURE — G0463: CPT

## 2018-02-02 PROCEDURE — 86901 BLOOD TYPING SEROLOGIC RH(D): CPT

## 2018-02-02 PROCEDURE — 86900 BLOOD TYPING SEROLOGIC ABO: CPT

## 2018-02-02 PROCEDURE — 85027 COMPLETE CBC AUTOMATED: CPT

## 2018-02-02 PROCEDURE — 86850 RBC ANTIBODY SCREEN: CPT

## 2018-02-02 PROCEDURE — 80053 COMPREHEN METABOLIC PANEL: CPT

## 2018-02-02 RX ORDER — ALVIMOPAN 12 MG/1
12 CAPSULE ORAL ONCE
Qty: 0 | Refills: 0 | Status: DISCONTINUED | OUTPATIENT
Start: 2018-02-02 | End: 2018-02-02

## 2018-02-02 NOTE — H&P PST ADULT - NSANTHOSAYNRD_GEN_A_CORE
No. MARLON screening performed.  STOP BANG Legend: 0-2 = LOW Risk; 3-4 = INTERMEDIATE Risk; 5-8 = HIGH Risk

## 2018-02-02 NOTE — H&P PST ADULT - PSH
H/O major orthopedic surgery  left ankle  S/P cholecystectomy H/O major orthopedic surgery  left ankle  S/P cholecystectomy    S/P coronary artery stent placement  (X2, 2012)

## 2018-02-02 NOTE — H&P PST ADULT - PROBLEM SELECTOR PLAN 1
PST: CBC,CMP,T&S  Medical evaluation with Dr. Aden  All preoperative instructions including/CHD cleanse reviewed with patient who verbalized understanding.   Avoid all NSAID/ASA containing products as well as all herbal/vitamin supplements. Tylenol only for pain/headache. Pt will stop his Aspirin Saturday 2/3/18

## 2018-02-02 NOTE — H&P PST ADULT - HISTORY OF PRESENT ILLNESS
This 71 yo male CVA, CAD, HTN, Hyperlipidemia, Seizure  presents to [facility] for a scheduled Laparoscopic Sigmoid Colectomy  with Dr Ambrose on 2/8/18 . He developed rectal bleeding he was admitted to [facility]. A Ct of the abdomen was done which was positive for extensive Diverticulosis. He denies any abdominal pain, diarrhea, blood in stoole. This 71 yo male CVA,CLL, CAD, HTN, Hyperlipidemia, Seizure  presents to [facility] for a scheduled Laparoscopic Sigmoid Colectomy  with Dr Ambrose on 2/8/18 . He developed rectal bleeding he was admitted to [facility]. A Ct of the abdomen was done which was positive for extensive Diverticulosis. He denies any abdominal pain, diarrhea, blood in stoole.

## 2018-02-02 NOTE — H&P PST ADULT - PMH
CAD (coronary artery disease)    CVA (cerebral vascular accident)    HLD (hyperlipidemia)    HTN (hypertension)    Seizure syndrome CAD (coronary artery disease)    CLL (chronic lymphocytic leukemia)  2010  CML (chronic myelocytic leukemia)    CVA (cerebral vascular accident)    HLD (hyperlipidemia)    HTN (hypertension)    Seizure syndrome

## 2018-02-05 ENCOUNTER — TRANSCRIPTION ENCOUNTER (OUTPATIENT)
Age: 73
End: 2018-02-05

## 2018-02-05 RX ORDER — ASPIRIN/CALCIUM CARB/MAGNESIUM 324 MG
0 TABLET ORAL
Qty: 0 | Refills: 0 | COMMUNITY

## 2018-02-06 RX ORDER — SODIUM CHLORIDE 9 MG/ML
1000 INJECTION, SOLUTION INTRAVENOUS
Qty: 0 | Refills: 0 | Status: DISCONTINUED | OUTPATIENT
Start: 2018-02-08 | End: 2018-02-08

## 2018-02-08 ENCOUNTER — RESULT REVIEW (OUTPATIENT)
Age: 73
End: 2018-02-08

## 2018-02-08 ENCOUNTER — TRANSCRIPTION ENCOUNTER (OUTPATIENT)
Age: 73
End: 2018-02-08

## 2018-02-08 ENCOUNTER — INPATIENT (INPATIENT)
Facility: HOSPITAL | Age: 73
LOS: 6 days | Discharge: OTHER INSTITUTION | DRG: 348 | End: 2018-02-15
Attending: SURGERY | Admitting: SURGERY
Payer: MEDICARE

## 2018-02-08 VITALS
DIASTOLIC BLOOD PRESSURE: 69 MMHG | HEART RATE: 75 BPM | TEMPERATURE: 98 F | SYSTOLIC BLOOD PRESSURE: 128 MMHG | WEIGHT: 179.02 LBS | OXYGEN SATURATION: 98 % | HEIGHT: 67 IN | RESPIRATION RATE: 21 BRPM

## 2018-02-08 DIAGNOSIS — C18.7 MALIGNANT NEOPLASM OF SIGMOID COLON: ICD-10-CM

## 2018-02-08 DIAGNOSIS — Z90.49 ACQUIRED ABSENCE OF OTHER SPECIFIED PARTS OF DIGESTIVE TRACT: Chronic | ICD-10-CM

## 2018-02-08 DIAGNOSIS — Z95.5 PRESENCE OF CORONARY ANGIOPLASTY IMPLANT AND GRAFT: Chronic | ICD-10-CM

## 2018-02-08 DIAGNOSIS — Z98.890 OTHER SPECIFIED POSTPROCEDURAL STATES: Chronic | ICD-10-CM

## 2018-02-08 LAB
ALBUMIN SERPL ELPH-MCNC: 3.3 G/DL — SIGNIFICANT CHANGE UP (ref 3.3–5)
ALP SERPL-CCNC: 92 U/L — SIGNIFICANT CHANGE UP (ref 40–120)
ALT FLD-CCNC: 22 U/L — SIGNIFICANT CHANGE UP (ref 12–78)
ANION GAP SERPL CALC-SCNC: 12 MMOL/L — SIGNIFICANT CHANGE UP (ref 5–17)
APTT BLD: 26 SEC — LOW (ref 27.5–37.4)
AST SERPL-CCNC: 29 U/L — SIGNIFICANT CHANGE UP (ref 15–37)
BILIRUB SERPL-MCNC: 1 MG/DL — SIGNIFICANT CHANGE UP (ref 0.2–1.2)
BUN SERPL-MCNC: 15 MG/DL — SIGNIFICANT CHANGE UP (ref 7–23)
CALCIUM SERPL-MCNC: 8.1 MG/DL — LOW (ref 8.5–10.1)
CHLORIDE SERPL-SCNC: 107 MMOL/L — SIGNIFICANT CHANGE UP (ref 96–108)
CO2 SERPL-SCNC: 22 MMOL/L — SIGNIFICANT CHANGE UP (ref 22–31)
CREAT SERPL-MCNC: 0.78 MG/DL — SIGNIFICANT CHANGE UP (ref 0.5–1.3)
EOSINOPHIL NFR BLD AUTO: 1 % — SIGNIFICANT CHANGE UP (ref 0–6)
GLUCOSE SERPL-MCNC: 100 MG/DL — HIGH (ref 70–99)
HCT VFR BLD CALC: 40.2 % — SIGNIFICANT CHANGE UP (ref 39–50)
HCT VFR BLD CALC: 41.4 % — SIGNIFICANT CHANGE UP (ref 39–50)
HGB BLD-MCNC: 13 G/DL — SIGNIFICANT CHANGE UP (ref 13–17)
HGB BLD-MCNC: 13.4 G/DL — SIGNIFICANT CHANGE UP (ref 13–17)
INR BLD: 1.3 RATIO — HIGH (ref 0.88–1.16)
LYMPHOCYTES # BLD AUTO: 84 % — HIGH (ref 13–44)
MAGNESIUM SERPL-MCNC: 1.8 MG/DL — SIGNIFICANT CHANGE UP (ref 1.6–2.6)
MCHC RBC-ENTMCNC: 29.1 PG — SIGNIFICANT CHANGE UP (ref 27–34)
MCHC RBC-ENTMCNC: 29.3 PG — SIGNIFICANT CHANGE UP (ref 27–34)
MCHC RBC-ENTMCNC: 32.3 GM/DL — SIGNIFICANT CHANGE UP (ref 32–36)
MCHC RBC-ENTMCNC: 32.4 GM/DL — SIGNIFICANT CHANGE UP (ref 32–36)
MCV RBC AUTO: 90.1 FL — SIGNIFICANT CHANGE UP (ref 80–100)
MCV RBC AUTO: 90.7 FL — SIGNIFICANT CHANGE UP (ref 80–100)
NEUTROPHILS NFR BLD AUTO: 15 % — LOW (ref 43–77)
PHOSPHATE SERPL-MCNC: 3.7 MG/DL — SIGNIFICANT CHANGE UP (ref 2.5–4.5)
PLATELET # BLD AUTO: 136 K/UL — LOW (ref 150–400)
PLATELET # BLD AUTO: 143 K/UL — LOW (ref 150–400)
POTASSIUM SERPL-MCNC: 4.3 MMOL/L — SIGNIFICANT CHANGE UP (ref 3.5–5.3)
POTASSIUM SERPL-SCNC: 4.3 MMOL/L — SIGNIFICANT CHANGE UP (ref 3.5–5.3)
PROT SERPL-MCNC: 6 G/DL — SIGNIFICANT CHANGE UP (ref 6–8.3)
PROTHROM AB SERPL-ACNC: 14.2 SEC — HIGH (ref 9.8–12.7)
RBC # BLD: 4.44 M/UL — SIGNIFICANT CHANGE UP (ref 4.2–5.8)
RBC # BLD: 4.59 M/UL — SIGNIFICANT CHANGE UP (ref 4.2–5.8)
RBC # FLD: 13.2 % — SIGNIFICANT CHANGE UP (ref 10.3–14.5)
RBC # FLD: 13.4 % — SIGNIFICANT CHANGE UP (ref 10.3–14.5)
SODIUM SERPL-SCNC: 141 MMOL/L — SIGNIFICANT CHANGE UP (ref 135–145)
WBC # BLD: 25.3 K/UL — HIGH (ref 3.8–10.5)
WBC # BLD: 25.9 K/UL — HIGH (ref 3.8–10.5)
WBC # FLD AUTO: 25.3 K/UL — HIGH (ref 3.8–10.5)
WBC # FLD AUTO: 25.9 K/UL — HIGH (ref 3.8–10.5)

## 2018-02-08 PROCEDURE — 88304 TISSUE EXAM BY PATHOLOGIST: CPT | Mod: 26

## 2018-02-08 PROCEDURE — 71045 X-RAY EXAM CHEST 1 VIEW: CPT | Mod: 26

## 2018-02-08 PROCEDURE — 88309 TISSUE EXAM BY PATHOLOGIST: CPT | Mod: 26

## 2018-02-08 PROCEDURE — 99291 CRITICAL CARE FIRST HOUR: CPT

## 2018-02-08 RX ORDER — CEFAZOLIN SODIUM 1 G
2000 VIAL (EA) INJECTION EVERY 8 HOURS
Qty: 0 | Refills: 0 | Status: COMPLETED | OUTPATIENT
Start: 2018-02-08 | End: 2018-02-09

## 2018-02-08 RX ORDER — METRONIDAZOLE 500 MG
500 TABLET ORAL ONCE
Qty: 0 | Refills: 0 | Status: COMPLETED | OUTPATIENT
Start: 2018-02-08 | End: 2018-02-08

## 2018-02-08 RX ORDER — MORPHINE SULFATE 50 MG/1
2 CAPSULE, EXTENDED RELEASE ORAL EVERY 4 HOURS
Qty: 0 | Refills: 0 | Status: DISCONTINUED | OUTPATIENT
Start: 2018-02-08 | End: 2018-02-09

## 2018-02-08 RX ORDER — PREGABALIN 225 MG/1
1 CAPSULE ORAL
Qty: 0 | Refills: 0 | COMMUNITY

## 2018-02-08 RX ORDER — ALVIMOPAN 12 MG/1
12 CAPSULE ORAL
Qty: 0 | Refills: 0 | Status: DISCONTINUED | OUTPATIENT
Start: 2018-02-09 | End: 2018-02-15

## 2018-02-08 RX ORDER — CHOLECALCIFEROL (VITAMIN D3) 125 MCG
1000 CAPSULE ORAL DAILY
Qty: 0 | Refills: 0 | Status: DISCONTINUED | OUTPATIENT
Start: 2018-02-08 | End: 2018-02-15

## 2018-02-08 RX ORDER — METRONIDAZOLE 500 MG
500 TABLET ORAL EVERY 8 HOURS
Qty: 0 | Refills: 0 | Status: COMPLETED | OUTPATIENT
Start: 2018-02-08 | End: 2018-02-09

## 2018-02-08 RX ORDER — ASPIRIN/CALCIUM CARB/MAGNESIUM 324 MG
1 TABLET ORAL
Qty: 0 | Refills: 0 | COMMUNITY

## 2018-02-08 RX ORDER — ENOXAPARIN SODIUM 100 MG/ML
40 INJECTION SUBCUTANEOUS DAILY
Qty: 0 | Refills: 0 | Status: DISCONTINUED | OUTPATIENT
Start: 2018-02-09 | End: 2018-02-15

## 2018-02-08 RX ORDER — GABAPENTIN 400 MG/1
100 CAPSULE ORAL
Qty: 0 | Refills: 0 | Status: DISCONTINUED | OUTPATIENT
Start: 2018-02-08 | End: 2018-02-15

## 2018-02-08 RX ORDER — HYDROMORPHONE HYDROCHLORIDE 2 MG/ML
0.5 INJECTION INTRAMUSCULAR; INTRAVENOUS; SUBCUTANEOUS
Qty: 0 | Refills: 0 | Status: DISCONTINUED | OUTPATIENT
Start: 2018-02-08 | End: 2018-02-08

## 2018-02-08 RX ORDER — PANTOPRAZOLE SODIUM 20 MG/1
40 TABLET, DELAYED RELEASE ORAL
Qty: 0 | Refills: 0 | Status: DISCONTINUED | OUTPATIENT
Start: 2018-02-08 | End: 2018-02-15

## 2018-02-08 RX ORDER — SODIUM CHLORIDE 9 MG/ML
1000 INJECTION, SOLUTION INTRAVENOUS
Qty: 0 | Refills: 0 | Status: DISCONTINUED | OUTPATIENT
Start: 2018-02-08 | End: 2018-02-08

## 2018-02-08 RX ORDER — CLOPIDOGREL BISULFATE 75 MG/1
1 TABLET, FILM COATED ORAL
Qty: 0 | Refills: 0 | COMMUNITY

## 2018-02-08 RX ORDER — CHOLECALCIFEROL (VITAMIN D3) 125 MCG
1 CAPSULE ORAL
Qty: 0 | Refills: 0 | COMMUNITY

## 2018-02-08 RX ORDER — MEN-PHOR .5; .5 G/G; G/G
1 LOTION TOPICAL
Qty: 0 | Refills: 0 | COMMUNITY

## 2018-02-08 RX ORDER — MOMETASONE FUROATE 1 MG/G
1 CREAM TOPICAL
Qty: 0 | Refills: 0 | COMMUNITY

## 2018-02-08 RX ORDER — ALVIMOPAN 12 MG/1
12 CAPSULE ORAL ONCE
Qty: 0 | Refills: 0 | Status: COMPLETED | OUTPATIENT
Start: 2018-02-08 | End: 2018-02-08

## 2018-02-08 RX ORDER — ATORVASTATIN CALCIUM 80 MG/1
40 TABLET, FILM COATED ORAL AT BEDTIME
Qty: 0 | Refills: 0 | Status: DISCONTINUED | OUTPATIENT
Start: 2018-02-08 | End: 2018-02-15

## 2018-02-08 RX ORDER — CEFAZOLIN SODIUM 1 G
2000 VIAL (EA) INJECTION ONCE
Qty: 0 | Refills: 0 | Status: COMPLETED | OUTPATIENT
Start: 2018-02-08 | End: 2018-02-08

## 2018-02-08 RX ORDER — METOPROLOL TARTRATE 50 MG
2 TABLET ORAL
Qty: 0 | Refills: 0 | Status: DISCONTINUED | OUTPATIENT
Start: 2018-02-08 | End: 2018-02-08

## 2018-02-08 RX ORDER — LEVETIRACETAM 250 MG/1
500 TABLET, FILM COATED ORAL EVERY 8 HOURS
Qty: 0 | Refills: 0 | Status: DISCONTINUED | OUTPATIENT
Start: 2018-02-08 | End: 2018-02-09

## 2018-02-08 RX ORDER — PANTOPRAZOLE SODIUM 20 MG/1
40 TABLET, DELAYED RELEASE ORAL DAILY
Qty: 0 | Refills: 0 | Status: DISCONTINUED | OUTPATIENT
Start: 2018-02-08 | End: 2018-02-08

## 2018-02-08 RX ORDER — SODIUM CHLORIDE 9 MG/ML
1000 INJECTION, SOLUTION INTRAVENOUS
Qty: 0 | Refills: 0 | Status: DISCONTINUED | OUTPATIENT
Start: 2018-02-08 | End: 2018-02-09

## 2018-02-08 RX ORDER — ASPIRIN/CALCIUM CARB/MAGNESIUM 324 MG
81 TABLET ORAL DAILY
Qty: 0 | Refills: 0 | Status: DISCONTINUED | OUTPATIENT
Start: 2018-02-09 | End: 2018-02-15

## 2018-02-08 RX ORDER — MORPHINE SULFATE 50 MG/1
4 CAPSULE, EXTENDED RELEASE ORAL EVERY 4 HOURS
Qty: 0 | Refills: 0 | Status: DISCONTINUED | OUTPATIENT
Start: 2018-02-08 | End: 2018-02-09

## 2018-02-08 RX ORDER — METOPROLOL TARTRATE 50 MG
50 TABLET ORAL EVERY 12 HOURS
Qty: 0 | Refills: 0 | Status: DISCONTINUED | OUTPATIENT
Start: 2018-02-09 | End: 2018-02-15

## 2018-02-08 RX ORDER — IPRATROPIUM/ALBUTEROL SULFATE 18-103MCG
3 AEROSOL WITH ADAPTER (GRAM) INHALATION ONCE
Qty: 0 | Refills: 0 | Status: COMPLETED | OUTPATIENT
Start: 2018-02-08 | End: 2018-02-08

## 2018-02-08 RX ORDER — ONDANSETRON 8 MG/1
4 TABLET, FILM COATED ORAL ONCE
Qty: 0 | Refills: 0 | Status: DISCONTINUED | OUTPATIENT
Start: 2018-02-08 | End: 2018-02-08

## 2018-02-08 RX ADMIN — ALVIMOPAN 12 MILLIGRAM(S): 12 CAPSULE ORAL at 08:52

## 2018-02-08 RX ADMIN — Medication 100 MILLIGRAM(S): at 17:45

## 2018-02-08 RX ADMIN — HYDROMORPHONE HYDROCHLORIDE 0.5 MILLIGRAM(S): 2 INJECTION INTRAMUSCULAR; INTRAVENOUS; SUBCUTANEOUS at 16:01

## 2018-02-08 RX ADMIN — Medication 100 MILLIGRAM(S): at 17:20

## 2018-02-08 RX ADMIN — SODIUM CHLORIDE 100 MILLILITER(S): 9 INJECTION, SOLUTION INTRAVENOUS at 13:30

## 2018-02-08 RX ADMIN — Medication 3 MILLILITER(S): at 13:11

## 2018-02-08 RX ADMIN — ATORVASTATIN CALCIUM 40 MILLIGRAM(S): 80 TABLET, FILM COATED ORAL at 22:13

## 2018-02-08 RX ADMIN — LEVETIRACETAM 420 MILLIGRAM(S): 250 TABLET, FILM COATED ORAL at 22:13

## 2018-02-08 RX ADMIN — MORPHINE SULFATE 4 MILLIGRAM(S): 50 CAPSULE, EXTENDED RELEASE ORAL at 22:24

## 2018-02-08 RX ADMIN — SODIUM CHLORIDE 75 MILLILITER(S): 9 INJECTION, SOLUTION INTRAVENOUS at 08:51

## 2018-02-08 RX ADMIN — PANTOPRAZOLE SODIUM 40 MILLIGRAM(S): 20 TABLET, DELAYED RELEASE ORAL at 18:06

## 2018-02-08 RX ADMIN — GABAPENTIN 100 MILLIGRAM(S): 400 CAPSULE ORAL at 18:06

## 2018-02-08 RX ADMIN — MORPHINE SULFATE 4 MILLIGRAM(S): 50 CAPSULE, EXTENDED RELEASE ORAL at 21:46

## 2018-02-08 NOTE — CONSULT NOTE ADULT - ASSESSMENT
pt is a 72 yr old male, assisted living resident with PMHx of 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 Hsieh). Recent admission at [facility] 1/22 - 1/26 admitted for G.I. bleed in which CT abd demonstrated extensive sigmoid colon diverticulitis with sigmoid submucosal mass, and obtained a colonoscopy with bx which demonstrated tubular adenoma. Pt was re admitted 2/2 for planned sigmoid resection which was performed today. Consult and admission to ICU s/p Laparoscopic assisted low anterior resection with take down of splenic flexure           Plan:  #Neuro:  resume keppra 500 mg IV q 8 hrs (when able to take p.o. resume p.o. dose)  sz precautions  neuro checks q 4 hrs and prn for changes  bedrest at present    #Pulm:  supplemental O2 to maintain spo2>92%  incentive spirometry 10x q 2 hrs   HOB>/= 30 degree angle    #CV:  ecg now and in a.m.  when able to take p.o. resume metoprolol 50 mg p.o. q 12 hr; plavix 75 mg po qd, ASA 81 mg po qd; atorvastatin 40 mg qhs  (TTE with EF 65%; mild diastolic dysfunction) pt is a 72 yr old male, assisted living resident with PMHx of 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 Hsieh). Recent admission at [facility] 1/22 - 1/26 admitted for G.I. bleed in which CT abd demonstrated extensive sigmoid colon diverticulitis with sigmoid submucosal mass, and obtained a colonoscopy with bx which demonstrated tubular adenoma. Pt was re admitted 2/2 for planned sigmoid resection which was performed today. Consult and admission to ICU s/p Laparoscopic assisted low anterior resection with take down of splenic flexure           Plan:  #Neuro:  resume keppra 500 mg IV q 8 hrs (when able to take p.o. resume p.o. dose)  sz precautions  neuro checks q 4 hrs and prn for changes  pain med with morphine 2 mg q 2 hrs prn  bedrest at present    #Pulm:  supplemental O2 to maintain spo2>92%  incentive spirometry 10x q 2 hrs   HOB>/= 30 degree angle    #CV:  ecg now and in a.m.  when able to take p.o. resume metoprolol 50 mg p.o. q 12 hr; plavix 75 mg po qd, ASA 81 mg po qd (will start today); atorvastatin 40 mg qhs  (TTE with EF 65%; mild diastolic dysfunction)    #G.I./:  NPO at present   pt on protonix on home meds, will give IV 40 mg qd at present  strict I & O's keep even     #I.D.:  keflex 2gms IV q 8 hrs x 3 doses  flagyl 500 mg IV q 8 hrs x 3 doses    #FEN/ENDO/HEME:  obtain CMP/Mg++/PO--4 now and in a.m.  coags now and in a.m.  cbc with diff now and in a.m.  RL at 75cc/hr  heparin 5000 units subq q 8 hrs for prophylaxis    Critical Care Time: 35 minutes  Reviewing data, imaging, discussing with multidisciplinary team, not inclusive of procedures, discussing goals of care with patient

## 2018-02-08 NOTE — CONSULT NOTE ADULT - SUBJECTIVE AND OBJECTIVE BOX
CHIEF COMPLAINT:    HPI:  pt is a 72 yr old male, assisted living resident with PMHx of CVA '10 with left sided hemiplegia, seizure activity on keppra, CAD s/p 2 stents '10, HTN, HLD, 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 Hsieh). Recent admission at [facility] 1/22 - 1/26 admitted for G.I. bleed in which CT abd demonstrated extensive sigmoid colon diverticulitis with sigmoid submucosal mass, and obtained a colonoscopy with bx which demonstrated tubular adenoma. Pt was re admitted 2/2 for planned sigmoid resection which was performed today. Consult and admission to ICU s/p Laparoscopic assisted low anterior resection    with take down of splenic flexure           PAST MEDICAL & SURGICAL HISTORY:  CLL (chronic lymphocytic leukemia): 2010  CML (chronic myelocytic leukemia)  Seizure syndrome  HTN (hypertension)  CVA (cerebral vascular accident)  HLD (hyperlipidemia)  CAD (coronary artery disease)  S/P coronary artery stent placement: (X2, 2012)  H/O major orthopedic surgery: left ankle  S/P cholecystectomy      FAMILY HISTORY:  No pertinent family history in first degree relatives      SOCIAL HISTORY:  Smoking: [ ] Never Smoked [ ] Former Smoker (__ packs x ___ years) [ ] Current Smoker  (__ packs x ___ years)  Substance Use: [ ] Never Used [ ] Used ____  EtOH Use:  Marital Status: [ ] Single [ ]  [ ]  [ ]   Sexual History:   Occupation:  Recent Travel:  Country of Birth:  Advance Directives:    Allergies    No Known Allergies    Intolerances        HOME MEDICATIONS:    REVIEW OF SYSTEMS:  Constitutional: [ ] fevers [ ] chills [ ] weight loss [ ] weight gain  HEENT: [ ] dry eyes [ ] eye irritation [ ] postnasal drip [ ] nasal congestion  CV: [ ] chest pain [ ] orthopnea [ ] palpitations [ ] murmur  Resp: [ ] cough [ ] shortness of breath [ ] dyspnea [ ] wheezing [ ] sputum [ ] hemoptysis  GI: [ ] nausea [ ] vomiting [ ] diarrhea [ ] constipation [ ] abd pain [ ] dysphagia   : [ ] dysuria [ ] nocturia [ ] hematuria [ ] increased urinary frequency  Musculoskeletal: [ ] back pain [ ] myalgias [ ] arthralgias [ ] fracture  Skin: [ ] rash [ ] itch  Neurological: [ ] headache [ ] dizziness [ ] syncope [ ] weakness [ ] numbness  Psychiatric: [ ] anxiety [ ] depression  Endocrine: [ ] diabetes [ ] thyroid problem  Hematologic/Lymphatic: [ ] anemia [ ] bleeding problem  Allergic/Immunologic: [ ] itchy eyes [ ] nasal discharge [ ] hives [ ] angioedema  [ ] All other systems negative  [ ] Unable to assess ROS because ________    OBJECTIVE:  ICU Vital Signs Last 24 Hrs  T(C): 36.7 (08 Feb 2018 07:57), Max: 36.7 (08 Feb 2018 07:57)  T(F): 98.1 (08 Feb 2018 07:57), Max: 98.1 (08 Feb 2018 07:57)  HR: 75 (08 Feb 2018 07:57) (75 - 75)  BP: 128/69 (08 Feb 2018 07:57) (128/69 - 128/69)  BP(mean): --  ABP: --  ABP(mean): --  RR: 21 (08 Feb 2018 07:57) (21 - 21)  SpO2: 98% (08 Feb 2018 07:57) (98% - 98%)        CAPILLARY BLOOD GLUCOSE      POCT Blood Glucose.: 91 mg/dL (08 Feb 2018 08:13)      PHYSICAL EXAM:  General:   HEENT:   Lymph Nodes:  Neck:   Respiratory:   Cardiovascular:   Abdomen:   Extremities:   Skin:   Neurological:  Psychiatry:    LINES:     HOSPITAL MEDICATIONS:  MEDICATIONS  (STANDING):    MEDICATIONS  (PRN):      LABS:    Hgb Trend: 14.5<--        Creatinine Trend: 0.72<--, 0.72<--, 0.63<--, 0.73<--, 0.77<--, 0.71<--            MICROBIOLOGY:     RADIOLOGY:  [ ] Reviewed and interpreted by me    EKG:        Teri ANP-BC (ext. [id number]) CHIEF COMPLAINT:    HPI:  pt is a 72 yr old male, assisted living resident with PMHx of 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 Hsieh). Recent admission at [facility] 1/22 - 1/26 admitted for G.I. bleed in which CT abd demonstrated extensive sigmoid colon diverticulitis with sigmoid submucosal mass, and obtained a colonoscopy with bx which demonstrated tubular adenoma. Pt was re admitted 2/2 for planned sigmoid resection which was performed today. Consult and admission to ICU s/p Laparoscopic assisted low anterior resection    with take down of splenic flexure           PAST MEDICAL & SURGICAL HISTORY:  CLL (chronic lymphocytic leukemia): 2010  CML (chronic myelocytic leukemia)  Seizure syndrome  HTN (hypertension)  CVA (cerebral vascular accident)  HLD (hyperlipidemia)  CAD (coronary artery disease)  S/P coronary artery stent placement: (X2, 2012)  H/O major orthopedic surgery: left ankle  S/P cholecystectomy      FAMILY HISTORY:  No pertinent family history in first degree relatives      SOCIAL HISTORY:  Smoking: [ ] Never Smoked [ ] Former Smoker (__ packs x ___ years) [ ] Current Smoker  (__ packs x ___ years)  Substance Use: [ ] Never Used [ ] Used ____  EtOH Use:  Marital Status: [ ] Single [ ]  [ ]  [ ]   Sexual History:   Occupation:  Recent Travel:  Country of Birth:  Advance Directives:    Allergies    No Known Allergies    Intolerances        HOME MEDICATIONS:    REVIEW OF SYSTEMS:  [xxxxx ] All other systems negative    OBJECTIVE:  ICU Vital Signs Last 24 Hrs  T(C): 36.7 (08 Feb 2018 07:57), Max: 36.7 (08 Feb 2018 07:57)  T(F): 98.1 (08 Feb 2018 07:57), Max: 98.1 (08 Feb 2018 07:57)  HR: 75 (08 Feb 2018 07:57) (75 - 75)  BP: 128/69 (08 Feb 2018 07:57) (128/69 - 128/69)  BP(mean): --  ABP: --  ABP(mean): --  RR: 21 (08 Feb 2018 07:57) (21 - 21)  SpO2: 98% (08 Feb 2018 07:57) (98% - 98%)        CAPILLARY BLOOD GLUCOSE      POCT Blood Glucose.: 91 mg/dL (08 Feb 2018 08:13)      PHYSICAL EXAM:  Neuro:  sleeping easily arouses to name, focuses upon examiner, pupils 2 mm sluggishly reactive to light, follows simple commands to demonstrate extremities, digits, Rt sided extremities 4/5, Lt sided 3/5 CN intact    Pulm:  utilizing venti mask, resp even, unlabored, breath sounds bilaterally with diminished breath sounds in bases to 1/4 up few bibasilar crackles otherwise clear. spo2 100%. able to take deep breaths spontaneously and upon command    CV:  cardiac monitor NSR, s1/s2 without adventitious sounds appreciated. peripheral pulses palpable, radials 2 + bilat, dp/pt 2+/1+ bilat, without edema noted, digits warm to touch with good cap refill < 3 sec's    GI/:  abd distended, soft, slight tender at surgical sites, hypoactive bowel sounds appreciated. felix patent to bsd draining clear yellow urine, bladder non distended, non palpable    Skin:  warm dry intact. surgical wounds with sutures approximated well.    LINES:     HOSPITAL MEDICATIONS:  MEDICATIONS  (STANDING):    MEDICATIONS  (PRN):      LABS:    Hgb Trend: 14.5<--        Creatinine Trend: 0.72<--, 0.72<--, 0.63<--, 0.73<--, 0.77<--, 0.71<--            MICROBIOLOGY:     RADIOLOGY:  [ ] Reviewed and interpreted by me    EKG:        Teri ANP-BC (ext. [id number]) CHIEF COMPLAINT:    HPI:  pt is a 72 yr old male, assisted living resident with PMHx of 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 Hsieh). Recent admission at [facility] 1/22 - 1/26 admitted for G.I. bleed in which CT abd demonstrated extensive sigmoid colon diverticulitis with sigmoid submucosal mass, and obtained a colonoscopy with bx which demonstrated tubular adenoma. Pt was re admitted 2/2 for planned sigmoid resection which was performed today. Consult and admission to ICU s/p Laparoscopic assisted low anterior resection with take down of splenic flexure           PAST MEDICAL & SURGICAL HISTORY:  CLL (chronic lymphocytic leukemia): 2010  CML (chronic myelocytic leukemia)  Seizure syndrome  HTN (hypertension)  CVA (cerebral vascular accident)  HLD (hyperlipidemia)  CAD (coronary artery disease)  S/P coronary artery stent placement: (X2, 2012)  H/O major orthopedic surgery: left ankle  S/P cholecystectomy      FAMILY HISTORY:  No pertinent family history in first degree relatives      SOCIAL HISTORY:  Smoking: [ ] Never Smoked [ ] Former Smoker (__ packs x ___ years) [ ] Current Smoker  (__ packs x ___ years)  Substance Use: [ ] Never Used [ ] Used ____  EtOH Use:  Marital Status: [ ] Single [ ]  [ ]  [ ]   Sexual History:   Occupation:  Recent Travel:  Country of Birth:  Advance Directives:    Allergies    No Known Allergies    Intolerances        HOME MEDICATIONS:    REVIEW OF SYSTEMS:  [xxxxx ] All other systems negative    OBJECTIVE:  ICU Vital Signs Last 24 Hrs  T(C): 36.7 (08 Feb 2018 07:57), Max: 36.7 (08 Feb 2018 07:57)  T(F): 98.1 (08 Feb 2018 07:57), Max: 98.1 (08 Feb 2018 07:57)  HR: 75 (08 Feb 2018 07:57) (75 - 75)  BP: 128/69 (08 Feb 2018 07:57) (128/69 - 128/69)  BP(mean): --  ABP: --  ABP(mean): --  RR: 21 (08 Feb 2018 07:57) (21 - 21)  SpO2: 98% (08 Feb 2018 07:57) (98% - 98%)        CAPILLARY BLOOD GLUCOSE      POCT Blood Glucose.: 91 mg/dL (08 Feb 2018 08:13)      PHYSICAL EXAM:  Neuro:  sleeping easily arouses to name, focuses upon examiner, pupils 2 mm sluggishly reactive to light, follows simple commands to demonstrate extremities, digits, Rt sided extremities 4/5, Lt sided 3/5 CN intact    Pulm:  utilizing venti mask, resp even, unlabored, breath sounds bilaterally with diminished breath sounds in bases to 1/4 up few bibasilar crackles otherwise clear. spo2 100%. able to take deep breaths spontaneously and upon command    CV:  cardiac monitor NSR, s1/s2 without adventitious sounds appreciated. peripheral pulses palpable, radials 2 + bilat, dp/pt 2+/1+ bilat, without edema noted, digits warm to touch with good cap refill < 3 sec's    GI/:  abd distended, soft, slight tender at surgical sites, hypoactive bowel sounds appreciated. felix patent to bsd draining clear yellow urine, bladder non distended, non palpable    Skin:  warm dry intact. surgical wounds with sutures approximated well.    LINES:     HOSPITAL MEDICATIONS:  MEDICATIONS  (STANDING):    MEDICATIONS  (PRN):      LABS:    Hgb Trend: 14.5<--        Creatinine Trend: 0.72<--, 0.72<--, 0.63<--, 0.73<--, 0.77<--, 0.71<--            MICROBIOLOGY:     RADIOLOGY:  [ ] Reviewed and interpreted by me    EKG:        Teri ANP-BC (ext. [id number])

## 2018-02-08 NOTE — CONSULT NOTE ADULT - SUBJECTIVE AND OBJECTIVE BOX
Chief Complaint:  Patient is a 72y old  Male who presents with a chief complaint of "I am having Colon surgery"s/p recetion large sigmoid mass was found in the colon and removed today pt in pacu post op sleeping no acute comoplaints right now  Allergies:  No Known Allergies      Medications:  ceFAZolin   IVPB 2000 milliGRAM(s) IV Intermittent every 8 hours  HYDROmorphone  Injectable 0.5 milliGRAM(s) IV Push every 10 minutes PRN  lactated ringers. 1000 milliLiter(s) IV Continuous <Continuous>  metoprolol    tartrate Injectable 2 milliGRAM(s) IV Push every 10 minutes PRN  metroNIDAZOLE  IVPB 500 milliGRAM(s) IV Intermittent every 8 hours  ondansetron Injectable 4 milliGRAM(s) IV Push once PRN  promethazine IVPB 6.25 milliGRAM(s) IV Intermittent once PRN      PMHX/PSHX:  CLL (chronic lymphocytic leukemia)  CML (chronic myelocytic leukemia)  Leukemia  Seizure syndrome  HTN (hypertension)  CVA (cerebral vascular accident)  HLD (hyperlipidemia)  CAD (coronary artery disease)  S/P coronary artery stent placement  H/O major orthopedic surgery  S/P cholecystectomy      Family history:  No pertinent family history in first degree relatives      Social History:  no etoh no cigs no ivda    ROS:     General:  No wt loss, fevers, chills, night sweats, fatigue,   Eyes:  Good vision, no reported pain  ENT:  No sore throat, pain, runny nose, dysphagia  CV:  No pain, palpitations, hypo/hypertension  Resp:  No dyspnea, cough, tachypnea, wheezing  GI:  No pain, No nausea, No vomiting, No diarrhea, No constipation, No weight loss, No fever, No pruritis, No rectal bleeding, No tarry stools, No dysphagia,  :  No pain, bleeding, incontinence, nocturia  Muscle:  No pain, weakness  Neuro:  No weakness, tingling, memory problems  Psych:  No fatigue, insomnia, mood problems, depression  Endocrine:  No polyuria, polydipsia, cold/heat intolerance  Heme:  No petechiae, ecchymosis, easy bruisability  Skin:  No rash, tattoos, scars, edema      PHYSICAL EXAM:   Vital Signs:  Vital Signs Last 24 Hrs  T(C): 36.8 (08 Feb 2018 13:06), Max: 37 (08 Feb 2018 12:51)  T(F): 98.2 (08 Feb 2018 13:06), Max: 98.6 (08 Feb 2018 12:51)  HR: 85 (08 Feb 2018 13:51) (75 - 86)  BP: 158/68 (08 Feb 2018 13:51) (128/69 - 180/72)  BP(mean): --  RR: 14 (08 Feb 2018 13:51) (12 - 21)  SpO2: 97% (08 Feb 2018 13:51) (94% - 99%)  Daily Height in cm: 170.18 (08 Feb 2018 07:57)    Daily     GENERAL:  Appears stated age, well-groomed, well-nourished, no distress  HEENT:  NC/AT,  conjunctivae clear and pink, no thyromegaly, nodules, adenopathy, no JVD, sclera -anicteric  CHEST:  Full & symmetric excursion, no increased effort, breath sounds clear  HEART:  Regular rhythm, S1, S2, no murmur/rub/S3/S4, no abdominal bruit, no edema  ABDOMEN:  Soft, non-tender, non-distended, normoactive bowel sounds,  no masses ,no hepato-splenomegaly, no signs of chronic liver disease  EXTEREMITIES:  no cyanosis,clubbing or edema  SKIN:  No rash/erythema/ecchymoses/petechiae/wounds/abscess/warm/dry  NEURO:  Alert, oriented, no asterixis, no tremor, no encephalopathy    LABS:                    Imaging:

## 2018-02-08 NOTE — BRIEF OPERATIVE NOTE - PROCEDURE
<<-----Click on this checkbox to enter Procedure Laparoscopic assisted low anterior resection  02/08/2018  with take down of splenic flexure  Active  NICKY

## 2018-02-08 NOTE — PATIENT PROFILE ADULT. - PSH
H/O major orthopedic surgery  left ankle  S/P cholecystectomy    S/P coronary artery stent placement  (X2, 2012)

## 2018-02-08 NOTE — PATIENT PROFILE ADULT. - PMH
CAD (coronary artery disease)    CLL (chronic lymphocytic leukemia)  2010  CML (chronic myelocytic leukemia)    CVA (cerebral vascular accident)    HLD (hyperlipidemia)    HTN (hypertension)    Seizure syndrome

## 2018-02-09 LAB
ALBUMIN SERPL ELPH-MCNC: 3 G/DL — LOW (ref 3.3–5)
ALP SERPL-CCNC: 83 U/L — SIGNIFICANT CHANGE UP (ref 40–120)
ALT FLD-CCNC: 21 U/L — SIGNIFICANT CHANGE UP (ref 12–78)
ANION GAP SERPL CALC-SCNC: 10 MMOL/L — SIGNIFICANT CHANGE UP (ref 5–17)
ANION GAP SERPL CALC-SCNC: 7 MMOL/L — SIGNIFICANT CHANGE UP (ref 5–17)
APTT BLD: 28.7 SEC — SIGNIFICANT CHANGE UP (ref 27.5–37.4)
AST SERPL-CCNC: 33 U/L — SIGNIFICANT CHANGE UP (ref 15–37)
BASOPHILS NFR BLD AUTO: 1 % — SIGNIFICANT CHANGE UP (ref 0–2)
BILIRUB SERPL-MCNC: 1 MG/DL — SIGNIFICANT CHANGE UP (ref 0.2–1.2)
BUN SERPL-MCNC: 12 MG/DL — SIGNIFICANT CHANGE UP (ref 7–23)
BUN SERPL-MCNC: 8 MG/DL — SIGNIFICANT CHANGE UP (ref 7–23)
CALCIUM SERPL-MCNC: 7.8 MG/DL — LOW (ref 8.5–10.1)
CALCIUM SERPL-MCNC: 8 MG/DL — LOW (ref 8.5–10.1)
CHLORIDE SERPL-SCNC: 103 MMOL/L — SIGNIFICANT CHANGE UP (ref 96–108)
CHLORIDE SERPL-SCNC: 105 MMOL/L — SIGNIFICANT CHANGE UP (ref 96–108)
CO2 SERPL-SCNC: 27 MMOL/L — SIGNIFICANT CHANGE UP (ref 22–31)
CO2 SERPL-SCNC: 27 MMOL/L — SIGNIFICANT CHANGE UP (ref 22–31)
CREAT SERPL-MCNC: 0.71 MG/DL — SIGNIFICANT CHANGE UP (ref 0.5–1.3)
CREAT SERPL-MCNC: 0.84 MG/DL — SIGNIFICANT CHANGE UP (ref 0.5–1.3)
GLUCOSE SERPL-MCNC: 141 MG/DL — HIGH (ref 70–99)
GLUCOSE SERPL-MCNC: 142 MG/DL — HIGH (ref 70–99)
HCT VFR BLD CALC: 40 % — SIGNIFICANT CHANGE UP (ref 39–50)
HGB BLD-MCNC: 13.4 G/DL — SIGNIFICANT CHANGE UP (ref 13–17)
INR BLD: 1.38 RATIO — HIGH (ref 0.88–1.16)
LYMPHOCYTES # BLD AUTO: 66 % — HIGH (ref 13–44)
MAGNESIUM SERPL-MCNC: 1.7 MG/DL — SIGNIFICANT CHANGE UP (ref 1.6–2.6)
MCHC RBC-ENTMCNC: 29.7 PG — SIGNIFICANT CHANGE UP (ref 27–34)
MCHC RBC-ENTMCNC: 33.4 GM/DL — SIGNIFICANT CHANGE UP (ref 32–36)
MCV RBC AUTO: 88.9 FL — SIGNIFICANT CHANGE UP (ref 80–100)
MONOCYTES NFR BLD AUTO: 3 % — SIGNIFICANT CHANGE UP (ref 1–9)
NEUTROPHILS NFR BLD AUTO: 22 % — LOW (ref 43–77)
PHOSPHATE SERPL-MCNC: 2.4 MG/DL — LOW (ref 2.5–4.5)
PLATELET # BLD AUTO: 118 K/UL — LOW (ref 150–400)
POTASSIUM SERPL-MCNC: 3.8 MMOL/L — SIGNIFICANT CHANGE UP (ref 3.5–5.3)
POTASSIUM SERPL-MCNC: 3.8 MMOL/L — SIGNIFICANT CHANGE UP (ref 3.5–5.3)
POTASSIUM SERPL-SCNC: 3.8 MMOL/L — SIGNIFICANT CHANGE UP (ref 3.5–5.3)
POTASSIUM SERPL-SCNC: 3.8 MMOL/L — SIGNIFICANT CHANGE UP (ref 3.5–5.3)
PROT SERPL-MCNC: 5.7 G/DL — LOW (ref 6–8.3)
PROTHROM AB SERPL-ACNC: 15.1 SEC — HIGH (ref 9.8–12.7)
RBC # BLD: 4.5 M/UL — SIGNIFICANT CHANGE UP (ref 4.2–5.8)
RBC # FLD: 13.4 % — SIGNIFICANT CHANGE UP (ref 10.3–14.5)
SODIUM SERPL-SCNC: 139 MMOL/L — SIGNIFICANT CHANGE UP (ref 135–145)
SODIUM SERPL-SCNC: 140 MMOL/L — SIGNIFICANT CHANGE UP (ref 135–145)
WBC # BLD: 22.4 K/UL — HIGH (ref 3.8–10.5)
WBC # FLD AUTO: 22.4 K/UL — HIGH (ref 3.8–10.5)

## 2018-02-09 PROCEDURE — 99233 SBSQ HOSP IP/OBS HIGH 50: CPT | Mod: GC

## 2018-02-09 PROCEDURE — 99223 1ST HOSP IP/OBS HIGH 75: CPT

## 2018-02-09 RX ORDER — HYDROMORPHONE HYDROCHLORIDE 2 MG/ML
1 INJECTION INTRAMUSCULAR; INTRAVENOUS; SUBCUTANEOUS EVERY 4 HOURS
Qty: 0 | Refills: 0 | Status: DISCONTINUED | OUTPATIENT
Start: 2018-02-09 | End: 2018-02-11

## 2018-02-09 RX ORDER — SODIUM CHLORIDE 9 MG/ML
1000 INJECTION, SOLUTION INTRAVENOUS
Qty: 0 | Refills: 0 | Status: DISCONTINUED | OUTPATIENT
Start: 2018-02-09 | End: 2018-02-14

## 2018-02-09 RX ORDER — CLOPIDOGREL BISULFATE 75 MG/1
75 TABLET, FILM COATED ORAL DAILY
Qty: 0 | Refills: 0 | Status: DISCONTINUED | OUTPATIENT
Start: 2018-02-09 | End: 2018-02-09

## 2018-02-09 RX ORDER — MORPHINE SULFATE 50 MG/1
4 CAPSULE, EXTENDED RELEASE ORAL EVERY 4 HOURS
Qty: 0 | Refills: 0 | Status: DISCONTINUED | OUTPATIENT
Start: 2018-02-09 | End: 2018-02-15

## 2018-02-09 RX ORDER — LEVETIRACETAM 250 MG/1
500 TABLET, FILM COATED ORAL EVERY 8 HOURS
Qty: 0 | Refills: 0 | Status: DISCONTINUED | OUTPATIENT
Start: 2018-02-09 | End: 2018-02-15

## 2018-02-09 RX ORDER — POTASSIUM PHOSPHATE, MONOBASIC POTASSIUM PHOSPHATE, DIBASIC 236; 224 MG/ML; MG/ML
15 INJECTION, SOLUTION INTRAVENOUS ONCE
Qty: 0 | Refills: 0 | Status: COMPLETED | OUTPATIENT
Start: 2018-02-09 | End: 2018-02-09

## 2018-02-09 RX ORDER — MAGNESIUM SULFATE 500 MG/ML
2 VIAL (ML) INJECTION ONCE
Qty: 0 | Refills: 0 | Status: COMPLETED | OUTPATIENT
Start: 2018-02-09 | End: 2018-02-09

## 2018-02-09 RX ORDER — POTASSIUM CHLORIDE 20 MEQ
40 PACKET (EA) ORAL ONCE
Qty: 0 | Refills: 0 | Status: COMPLETED | OUTPATIENT
Start: 2018-02-09 | End: 2018-02-09

## 2018-02-09 RX ORDER — INFLUENZA VIRUS VACCINE 15; 15; 15; 15 UG/.5ML; UG/.5ML; UG/.5ML; UG/.5ML
0.5 SUSPENSION INTRAMUSCULAR ONCE
Qty: 0 | Refills: 0 | Status: DISCONTINUED | OUTPATIENT
Start: 2018-02-09 | End: 2018-02-15

## 2018-02-09 RX ORDER — MORPHINE SULFATE 50 MG/1
1 CAPSULE, EXTENDED RELEASE ORAL EVERY 4 HOURS
Qty: 0 | Refills: 0 | Status: DISCONTINUED | OUTPATIENT
Start: 2018-02-09 | End: 2018-02-15

## 2018-02-09 RX ADMIN — MORPHINE SULFATE 4 MILLIGRAM(S): 50 CAPSULE, EXTENDED RELEASE ORAL at 06:05

## 2018-02-09 RX ADMIN — MORPHINE SULFATE 2 MILLIGRAM(S): 50 CAPSULE, EXTENDED RELEASE ORAL at 07:42

## 2018-02-09 RX ADMIN — Medication 50 MILLIGRAM(S): at 17:38

## 2018-02-09 RX ADMIN — MORPHINE SULFATE 2 MILLIGRAM(S): 50 CAPSULE, EXTENDED RELEASE ORAL at 07:26

## 2018-02-09 RX ADMIN — SODIUM CHLORIDE 75 MILLILITER(S): 9 INJECTION, SOLUTION INTRAVENOUS at 07:27

## 2018-02-09 RX ADMIN — HYDROMORPHONE HYDROCHLORIDE 1 MILLIGRAM(S): 2 INJECTION INTRAMUSCULAR; INTRAVENOUS; SUBCUTANEOUS at 09:25

## 2018-02-09 RX ADMIN — Medication 81 MILLIGRAM(S): at 12:08

## 2018-02-09 RX ADMIN — POTASSIUM PHOSPHATE, MONOBASIC POTASSIUM PHOSPHATE, DIBASIC 62.5 MILLIMOLE(S): 236; 224 INJECTION, SOLUTION INTRAVENOUS at 09:09

## 2018-02-09 RX ADMIN — Medication 1000 UNIT(S): at 12:09

## 2018-02-09 RX ADMIN — ATORVASTATIN CALCIUM 40 MILLIGRAM(S): 80 TABLET, FILM COATED ORAL at 22:06

## 2018-02-09 RX ADMIN — Medication 100 MILLIGRAM(S): at 09:08

## 2018-02-09 RX ADMIN — HYDROMORPHONE HYDROCHLORIDE 1 MILLIGRAM(S): 2 INJECTION INTRAMUSCULAR; INTRAVENOUS; SUBCUTANEOUS at 21:31

## 2018-02-09 RX ADMIN — HYDROMORPHONE HYDROCHLORIDE 1 MILLIGRAM(S): 2 INJECTION INTRAMUSCULAR; INTRAVENOUS; SUBCUTANEOUS at 13:30

## 2018-02-09 RX ADMIN — ALVIMOPAN 12 MILLIGRAM(S): 12 CAPSULE ORAL at 05:30

## 2018-02-09 RX ADMIN — LEVETIRACETAM 420 MILLIGRAM(S): 250 TABLET, FILM COATED ORAL at 05:29

## 2018-02-09 RX ADMIN — GABAPENTIN 100 MILLIGRAM(S): 400 CAPSULE ORAL at 17:38

## 2018-02-09 RX ADMIN — HYDROMORPHONE HYDROCHLORIDE 1 MILLIGRAM(S): 2 INJECTION INTRAMUSCULAR; INTRAVENOUS; SUBCUTANEOUS at 09:08

## 2018-02-09 RX ADMIN — ENOXAPARIN SODIUM 40 MILLIGRAM(S): 100 INJECTION SUBCUTANEOUS at 12:08

## 2018-02-09 RX ADMIN — HYDROMORPHONE HYDROCHLORIDE 1 MILLIGRAM(S): 2 INJECTION INTRAMUSCULAR; INTRAVENOUS; SUBCUTANEOUS at 13:12

## 2018-02-09 RX ADMIN — Medication 50 MILLIGRAM(S): at 05:30

## 2018-02-09 RX ADMIN — LEVETIRACETAM 500 MILLIGRAM(S): 250 TABLET, FILM COATED ORAL at 16:47

## 2018-02-09 RX ADMIN — ALVIMOPAN 12 MILLIGRAM(S): 12 CAPSULE ORAL at 18:50

## 2018-02-09 RX ADMIN — Medication 40 MILLIEQUIVALENT(S): at 09:08

## 2018-02-09 RX ADMIN — PANTOPRAZOLE SODIUM 40 MILLIGRAM(S): 20 TABLET, DELAYED RELEASE ORAL at 06:06

## 2018-02-09 RX ADMIN — HYDROMORPHONE HYDROCHLORIDE 1 MILLIGRAM(S): 2 INJECTION INTRAMUSCULAR; INTRAVENOUS; SUBCUTANEOUS at 22:00

## 2018-02-09 RX ADMIN — GABAPENTIN 100 MILLIGRAM(S): 400 CAPSULE ORAL at 05:30

## 2018-02-09 RX ADMIN — LEVETIRACETAM 500 MILLIGRAM(S): 250 TABLET, FILM COATED ORAL at 21:31

## 2018-02-09 RX ADMIN — HYDROMORPHONE HYDROCHLORIDE 1 MILLIGRAM(S): 2 INJECTION INTRAMUSCULAR; INTRAVENOUS; SUBCUTANEOUS at 17:28

## 2018-02-09 RX ADMIN — Medication 50 GRAM(S): at 09:08

## 2018-02-09 RX ADMIN — HYDROMORPHONE HYDROCHLORIDE 1 MILLIGRAM(S): 2 INJECTION INTRAMUSCULAR; INTRAVENOUS; SUBCUTANEOUS at 18:28

## 2018-02-09 RX ADMIN — MORPHINE SULFATE 4 MILLIGRAM(S): 50 CAPSULE, EXTENDED RELEASE ORAL at 05:29

## 2018-02-09 RX ADMIN — Medication 100 MILLIGRAM(S): at 01:38

## 2018-02-09 NOTE — CONSULT NOTE ADULT - ASSESSMENT
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 Hsieh). Recent admission at [facility] 1/22 - 1/26 admitted for G.I. bleed in which CT abd demonstrated extensive sigmoid colon diverticulitis with sigmoid submucosal mass, and obtained a colonoscopy with bx which demonstrated tubular adenoma. Pt was re admitted 2/2 for planned sigmoid resection which was performed today. Initial Consult and admission to ICU s/p Laparoscopic assisted low anterior resection with take down of splenic flexure , now on surgical floor      Plan:  #Neuro:  resume keppra 500 mg IV q 8 hrs (when able to take p.o. resume p.o. dose)  sz precautions, asp precautions  neuro checks q 4 hrs and prn for changes  pain med with morphine 2 mg q 2 hrs prn  ambulate as tolerated if ok with surgery    #Pulm:  supplemental O2 to maintain spo2>92%  incentive spirometry 10x q 2 hrs   HOB>/= 30 degree angle    #CV: CAD, HTN, HLD  stable  restart plavix tomorrow 2/10/18 POD #2  when able to take p.o. resume metoprolol 50 mg p.o. q 12 hr; plavix 75 mg po qd, ASA 81 mg po qd (will start today); atorvastatin 40 mg qhs  (TTE with EF 65%; mild diastolic dysfunction)    #G.I./: GERD  diet as per surgery currently clears  pt on protonix on home meds, will give IV 40 mg qd at present  strict I & O's keep even     #I.D.:  empiric coverage completed as below  keflex 2gms IV q 8 hrs x 3 doses  flagyl 500 mg IV q 8 hrs x 3 doses    #FEN/ENDO/HEME:  CBC with diff and CMP in AM  leukocytosis likely reactive trending down, h/h stable  cont ivf  replete lytes prn  heparin 5000 units subq q 8 hrs for prophylaxis 72 yr old male, assisted living resident with PMHx of 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 Hsieh). Recent admission at [facility] 1/22 - 1/26 admitted for G.I. bleed in which CT abd demonstrated extensive sigmoid colon diverticulitis with sigmoid submucosal mass, and obtained a colonoscopy with bx which demonstrated tubular adenoma. Pt was re admitted 2/2 for planned sigmoid resection which was performed today. Initial Consult and admission to ICU s/p Laparoscopic assisted low anterior resection with take down of splenic flexure , now on surgical floor      Plan:  #Neuro:  resume keppra 500 mg IV q 8 hrs (when able to take p.o. resume p.o. dose)  sz precautions, asp precautions  neuro checks q 4 hrs and prn for changes  pain med with morphine 2 mg q 2 hrs prn  ambulate as tolerated if ok with surgery    #Pulm:  supplemental O2 to maintain spo2>92%  incentive spirometry 10x q 2 hrs   HOB>/= 30 degree angle    #CV: CAD, HTN, HLD  stable  restart plavix tomorrow 2/10/18 POD #2  assure adequate pain control  when able to take p.o. resume metoprolol 50 mg p.o. q 12 hr; plavix 75 mg po qd, ASA 81 mg po qd (will start today); atorvastatin 40 mg qhs  (TTE with EF 65%; mild diastolic dysfunction)    #G.I./: GERD  diet as per surgery currently clears  pt on protonix on home meds, will give IV 40 mg qd at present  strict I & O's keep even     #I.D.:  empiric coverage completed as below  keflex 2gms IV q 8 hrs x 3 doses  flagyl 500 mg IV q 8 hrs x 3 doses    #FEN/ENDO/HEME:  CBC with diff and CMP in AM  leukocytosis likely reactive trending down, h/h stable  cont ivf  replete lytes prn  heparin 5000 units subq q 8 hrs for prophylaxis

## 2018-02-09 NOTE — PROGRESS NOTE ADULT - ASSESSMENT
This 71 yo male CVA (residual L hemiplegia), CLL (2010), CML, CAD (s/p stent x2 2012), HTN, Hyperlipidemia, Seizure disorder, diverticulosis, admitted to lap sigmoid colectomy for sigmoid colon mass , now POD # 1  lap assisted low anterior resection    - POD day #1, continue with morphine for mild/moderate pain control, add dilaudid PRN severe pain . Seizure disorder- change keppra to PO, c/w seziure precautions. c/w home neurontin   - Hemodynamically stable. h/o CAD s/p stent (2012)- restart ASA, plavix, statin. HTN-   - No respiratory concerns.  - s/p lap assisted low anterior resection, pod#1, NPO except meds. Diet per surgery .continue with NGT to low intermittent suction. Entereg BID.  c/w home protonix.  - Renal function stable. hypophosphatemia - replete   - leukocytosis - chronic in setting of CML. DVT ppx with lovenox.  - Full code This 73 yo male CVA (residual L hemiplegia), CLL (2010), CML, CAD (s/p stent x2 2012), HTN, Hyperlipidemia, Seizure disorder, diverticulosis, admitted to lap sigmoid colectomy for sigmoid colon mass , now POD # 1  lap assisted low anterior resection    - POD day #1, continue with morphine for mild/moderate pain control, add dilaudid PRN severe pain . Seizure disorder- change keppra to PO, c/w seziure precautions. c/w home neurontin   - Hemodynamically stable. h/o CAD s/p stent (2012)- restart ASA statin. Restart plavix tomorrow.  - No respiratory concerns.  - s/p lap assisted low anterior resection, pod#1, Advance to clears. NGT d/cs. c/w Entereg BID.  c/w home protonix.  - Renal function stable. hypophosphatemia - replete   - leukocytosis - chronic in setting of CML. DVT ppx with lovenox.  - Full code

## 2018-02-09 NOTE — PROGRESS NOTE ADULT - SUBJECTIVE AND OBJECTIVE BOX
pt seen  c/o pain, controlled with meds  -f-bm  ICU Vital Signs Last 24 Hrs  T(C): 36.6 (09 Feb 2018 11:20), Max: 38.2 (09 Feb 2018 04:01)  T(F): 97.9 (09 Feb 2018 11:20), Max: 100.8 (09 Feb 2018 04:01)  HR: 78 (09 Feb 2018 11:20) (78 - 87)  BP: 163/86 (09 Feb 2018 11:20) (128/59 - 180/72)  BP(mean): 92 (09 Feb 2018 10:00) (82 - 119)  ABP: 143/72 (08 Feb 2018 13:51) (143/72 - 147/78)  ABP(mean): --  RR: 18 (09 Feb 2018 11:20) (12 - 24)  SpO2: 93% (09 Feb 2018 11:20) (89% - 99%)  gen- NAD  resp- clear b/l  CVS- reg rate and rhythm  abd- soft ND, appropriate tenderness, incisions c/d/i                          13.4   22.4  )-----------( 118      ( 09 Feb 2018 06:30 )             40.0   02-09    140  |  103  |  8   ----------------------------<  141<H>  3.8   |  27  |  0.71    Ca    8.0<L>      09 Feb 2018 06:30  Phos  2.4     02-09  Mg     1.7     02-09    TPro  5.7<L>  /  Alb  3.0<L>  /  TBili  1.0  /  DBili  x   /  AST  33  /  ALT  21  /  AlkPhos  83  02-09    I&O's Detail    08 Feb 2018 07:01  -  09 Feb 2018 07:00  --------------------------------------------------------  IN:    dextrose 5% + lactated ringers.: 1200 mL    lactated ringers.: 200 mL    Oral Fluid: 60 mL    Solution: 200 mL    Solution: 100 mL    Solution: 210 mL  Total IN: 1970 mL    OUT:    Indwelling Catheter - Urethral: 3040 mL    Nasoenteral Tube: 300 mL  Total OUT: 3340 mL    Total NET: -1370 mL

## 2018-02-09 NOTE — PROGRESS NOTE ADULT - ASSESSMENT
71 yo s/p lap assisted sigmoidectomy      -PT      -d/c NGT, start clears       -cont ASA, poss start plavix javier if HGB stable      -Hospitalist to see

## 2018-02-09 NOTE — CONSULT NOTE ADULT - ATTENDING COMMENTS
Patient seen and evaluated independently from above.    This is a 72-year-old male with a history of CVA in 2010 with residual L hemiparesis, HTN, HLD, CAD s/p PCI (2010), Seizures on keppra, 2.7 cm AAA, and h/o CML/CLL. He was recently admitted in January with GI bleed, found to have extensive sigmoid colon diverticulosis, s/p colonoscopy s/p biopsy found to have tubular adenoma. He was readmitted today for elective laparoscopic-assisted sigmoid colectomy with take down of splenic flexure and primary reanastamosis. Patient had no complications intra-op, minimal blood loss, hemodynamically stable.    - Admit to ICU for monitoring  - Restart Keppra for seizures; restart gabapentin  - HD stable, hold asa/plavix today, can restart tomorrow if H/H stable  - Restart metoprolol and atorvastatin  - Stable pulmonary status, NC prn, incentive spirometry  - NPO today, keep NG tube to intermittent low wall suction  - F/up surgery re: advancing diet  - Stable kidney function and lytes  - Abx per surgical protocol  - DVT ppx with lovenox sq  - Discussed with patient and son at bedside, full code  CC time spent: 35 min
Thank you for the courtesy of this consultation for medical management and we will continue to follow this nice patient's care with you.

## 2018-02-09 NOTE — PROGRESS NOTE ADULT - SUBJECTIVE AND OBJECTIVE BOX
The patient was interviewed and evaluated.    72y Male    T(C): 36.6 (02-09-18 @ 11:20), Max: 38.2 (02-09-18 @ 04:01)  HR: 78 (02-09-18 @ 11:20) (78 - 87)  BP: 163/86 (02-09-18 @ 11:20) (128/59 - 180/72)  RR: 18 (02-09-18 @ 11:20) (12 - 24)  SpO2: 93% (02-09-18 @ 11:20) (89% - 99%)  Wt(kg): --    No Nausea/vomiting, recall, sore throat or headache.    No anesthesia related complaints or sequelae.    No additional recommendations.

## 2018-02-09 NOTE — CHART NOTE - NSCHARTNOTEFT_GEN_A_CORE
71 yo male pmh CVA (residual L hemiplegia), CLL (2010), CML, CAD (s/p stent x2 2012), HTN, Hyperlipidemia, Seizure disorder, diverticulosis, admitted for sigmoid colon mass , s/p sigmoid colon resection pt transferred to ICU for observation.  No events overnight. POD # 1  pt advanced to clears. OOB to chair. NGT removed. Restarted on home PO medications. Plavix to be restarted POD 2. Optimized for transfer to floor.

## 2018-02-09 NOTE — DIETITIAN INITIAL EVALUATION ADULT. - OTHER INFO
pt reports his UBW ~190#, says lost 10# in a week due to being on clear liquids to 181#.  Wt 2/8 here 185.4#.  Pt POD#1  post laparoscopic low anterior resection 2/2 colon mass

## 2018-02-09 NOTE — PROGRESS NOTE ADULT - SUBJECTIVE AND OBJECTIVE BOX
Interval events:     Review of Systems:    T(F): 100.8 (02-09-18 @ 04:01), Max: 100.8 (02-09-18 @ 04:01)  HR: 78 (02-09-18 @ 07:00) (75 - 86)  BP: 155/67 (02-09-18 @ 07:00) (128/59 - 180/72)  RR: 13 (02-09-18 @ 07:00) (12 - 24)  SpO2: 95% (02-09-18 @ 07:00) (89% - 99%)  Wt(kg): --    CAPILLARY BLOOD GLUCOSE    POCT Blood Glucose.: 112 mg/dL (08 Feb 2018 13:07)    I&O's Summary    08 Feb 2018 07:01  -  09 Feb 2018 07:00  --------------------------------------------------------  IN: 1970 mL / OUT: 3340 mL / NET: -1370 mL      Physical Exam:     Gen:  Neuro:  HEENT:  CV:  Pulm:  GI:  Ext:  Skin:    Meds:  aspirin  chewable 81 milliGRAM(s) Oral daily  enoxaparin Injectable 40 milliGRAM(s) SubCutaneous daily  ceFAZolin   IVPB 2000 milliGRAM(s) IV Intermittent every 8 hours  metroNIDAZOLE  IVPB 500 milliGRAM(s) IV Intermittent every 8 hours  metoprolol     tartrate 50 milliGRAM(s) Oral every 12 hours  atorvastatin 40 milliGRAM(s) Oral at bedtime  gabapentin 100 milliGRAM(s) Oral two times a day  levETIRAcetam  IVPB 500 milliGRAM(s) IV Intermittent every 8 hours  morphine  - Injectable 2 milliGRAM(s) IV Push every 4 hours PRN  morphine  - Injectable 4 milliGRAM(s) IV Push every 4 hours PRN  alvimopan 12 milliGRAM(s) Oral two times a day  pantoprazole    Tablet 40 milliGRAM(s) Oral before breakfast  cholecalciferol 1000 Unit(s) Oral daily  dextrose 5% + lactated ringers. 1000 milliLiter(s) IV Continuous <Continuous>                          13.4   22.4  )-----------( 118      ( 09 Feb 2018 06:30 )             40.0       02-09    140  |  103  |  8   ----------------------------<  141<H>  3.8   |  27  |  0.71    Ca    8.0<L>      09 Feb 2018 06:30  Phos  3.7     02-08  Mg     1.8     02-08    TPro  5.7<L>  /  Alb  3.0<L>  /  TBili  1.0  /  DBili  x   /  AST  33  /  ALT  21  /  AlkPhos  83  02-09      PT/INR - ( 09 Feb 2018 06:30 )   PT: 15.1 sec;   INR: 1.38 ratio         PTT - ( 09 Feb 2018 06:30 )  PTT:28.7 sec    CENTRAL LINE: N    TORRES: Y placed 2/8/18    A-LINE: N    GLOBAL ISSUE/BEST PRACTICE:  Analgesia: Dilaudid/morphine  Sedation: N  HOB elevation: yes  Stress ulcer prophylaxis: home protonix  VTE prophylaxis: Lovenox  Glycemic control: Y  Nutrition: NPO    CODE STATUS: *** Full code Interval events: complaining of pain incisional/abdominal pain despite morphine.    Review of Systems:  Denies fevers, chills  Denies chest pain, shortness of breath  Denies nausea, vomiting, +abdominal pain    T(F): 100.8 (02-09-18 @ 04:01), Max: 100.8 (02-09-18 @ 04:01)  HR: 78 (02-09-18 @ 07:00) (75 - 86)  BP: 155/67 (02-09-18 @ 07:00) (128/59 - 180/72)  RR: 13 (02-09-18 @ 07:00) (12 - 24)  SpO2: 95% (02-09-18 @ 07:00) (89% - 99%)  Wt(kg): --    CAPILLARY BLOOD GLUCOSE    POCT Blood Glucose.: 112 mg/dL (08 Feb 2018 13:07)    I&O's Summary    08 Feb 2018 07:01  -  09 Feb 2018 07:00  --------------------------------------------------------  IN: 1970 mL / OUT: 3340 mL / NET: -1370 mL      Physical Exam:     Gen: Awake, alert  HEENT: NCAT, MMM  CV: +S1S2, No M/R/G  Pulm: CTA B/L, No W/R/R  GI: Soft, tender x 4 quadrants, incisional site clean- no discharge/bleeding  Ext: no c/c/e  Skin: color appropriate, warm, dry    Meds:  aspirin  chewable 81 milliGRAM(s) Oral daily  enoxaparin Injectable 40 milliGRAM(s) SubCutaneous daily  ceFAZolin   IVPB 2000 milliGRAM(s) IV Intermittent every 8 hours  metroNIDAZOLE  IVPB 500 milliGRAM(s) IV Intermittent every 8 hours  metoprolol     tartrate 50 milliGRAM(s) Oral every 12 hours  atorvastatin 40 milliGRAM(s) Oral at bedtime  gabapentin 100 milliGRAM(s) Oral two times a day  levETIRAcetam  IVPB 500 milliGRAM(s) IV Intermittent every 8 hours  morphine  - Injectable 2 milliGRAM(s) IV Push every 4 hours PRN  morphine  - Injectable 4 milliGRAM(s) IV Push every 4 hours PRN  alvimopan 12 milliGRAM(s) Oral two times a day  pantoprazole    Tablet 40 milliGRAM(s) Oral before breakfast  cholecalciferol 1000 Unit(s) Oral daily  dextrose 5% + lactated ringers. 1000 milliLiter(s) IV Continuous <Continuous>                          13.4   22.4  )-----------( 118      ( 09 Feb 2018 06:30 )             40.0       02-09    140  |  103  |  8   ----------------------------<  141<H>  3.8   |  27  |  0.71    Ca    8.0<L>      09 Feb 2018 06:30  Phos  3.7     02-08  Mg     1.8     02-08    TPro  5.7<L>  /  Alb  3.0<L>  /  TBili  1.0  /  DBili  x   /  AST  33  /  ALT  21  /  AlkPhos  83  02-09      PT/INR - ( 09 Feb 2018 06:30 )   PT: 15.1 sec;   INR: 1.38 ratio         PTT - ( 09 Feb 2018 06:30 )  PTT:28.7 sec    CENTRAL LINE: N    TORRES: Y placed 2/8/18    A-LINE: N    GLOBAL ISSUE/BEST PRACTICE:  Analgesia: Dilaudid/morphine  Sedation: N  HOB elevation: yes  Stress ulcer prophylaxis: home protonix  VTE prophylaxis: Lovenox  Glycemic control: Y  Nutrition: NPO    CODE STATUS: *** Full code

## 2018-02-09 NOTE — CONSULT NOTE ADULT - SUBJECTIVE AND OBJECTIVE BOX
73 yo male pmh CVA (residual L hemiplegia), CLL (2010), CML, CAD (s/p stent x2 2012), HTN, Hyperlipidemia, Seizure disorder, diverticulosis, admitted for sigmoid colon mass , s/p sigmoid colon resection pt transferred to ICU for observation.  No events overnight. POD # 1  pt advanced to clears. OOB to chair. NGT removed. Restarted on home PO medications. Plavix to be restarted POD 2. Optimized for transfer to floor.    Pt seen and examined. Has some abdominal tenderness in lower abd area due to incision site of surgery and soreness. Pain meds help. Denies any other acute complaints at this time.

## 2018-02-09 NOTE — PROGRESS NOTE ADULT - SUBJECTIVE AND OBJECTIVE BOX
INTERVAL HPI/OVERNIGHT EVENTS:  sitting in chair   tolerating liquids  MEDICATIONS  (STANDING):  alvimopan 12 milliGRAM(s) Oral two times a day  aspirin  chewable 81 milliGRAM(s) Oral daily  atorvastatin 40 milliGRAM(s) Oral at bedtime  cholecalciferol 1000 Unit(s) Oral daily  dextrose 5% + lactated ringers. 1000 milliLiter(s) (50 mL/Hr) IV Continuous <Continuous>  enoxaparin Injectable 40 milliGRAM(s) SubCutaneous daily  gabapentin 100 milliGRAM(s) Oral two times a day  influenza   Vaccine 0.5 milliLiter(s) IntraMuscular once  levETIRAcetam 500 milliGRAM(s) Oral every 8 hours  metoprolol     tartrate 50 milliGRAM(s) Oral every 12 hours  pantoprazole    Tablet 40 milliGRAM(s) Oral before breakfast    MEDICATIONS  (PRN):  HYDROmorphone  Injectable 1 milliGRAM(s) IV Push every 4 hours PRN Severe Pain (7 - 10)  morphine  - Injectable 4 milliGRAM(s) IV Push every 4 hours PRN Moderate Pain (4 - 6)  morphine  - Injectable 1 milliGRAM(s) IV Push every 4 hours PRN Mild Pain (1 - 3)      Allergies    No Known Allergies    Intolerances        Review of Systems:    General:  No wt loss, fevers, chills, night sweats,fatigue,   Eyes:  Good vision, no reported pain  ENT:  No sore throat, pain, runny nose, dysphagia  CV:  No pain, palpitatioins, hypo/hypertension  Resp:  No dyspnea, cough, tachypnea, wheezing  GI:  No pain, No nausea, No vomiting, No diarrhea, No constipatiion, No weight loss, No fever, No pruritis, No rectal bleeding, No tarry stools, No dysphagia,  :  No pain, bleeding, incontinence, nocturia  Muscle:  No pain, weakness  Neuro:  No weakness, tingling, memory problems  Psych:  No fatigue, insomnia, mood problems, depression  Endocrine:  No polyuria, polydypsia, cold/heat intolerance  Heme:  No petechiae, ecchymosis, easy bruisability  Skin:  No rash, tattoos, scars, edema      Vital Signs Last 24 Hrs  T(C): 36.4 (09 Feb 2018 08:00), Max: 38.2 (09 Feb 2018 04:01)  T(F): 97.6 (09 Feb 2018 08:00), Max: 100.8 (09 Feb 2018 04:01)  HR: 87 (09 Feb 2018 10:00) (78 - 87)  BP: 133/63 (09 Feb 2018 10:00) (128/59 - 180/72)  BP(mean): 92 (09 Feb 2018 10:00) (82 - 119)  RR: 18 (09 Feb 2018 10:00) (12 - 24)  SpO2: 96% (09 Feb 2018 10:00) (89% - 99%)    PHYSICAL EXAM:    Constitutional: NAD, well-developed  HEENT: EOMI, throat clear  Neck: No LAD, supple  Respiratory: CTA and P  Cardiovascular: S1 and S2, RRR, no M  Gastrointestinal: BS+, soft, NT/ND, neg HSM,  Extremities: No peripheral edema, neg clubing, cyanosis  Vascular: 2+ peripheral pulses  Neurological: A/O x 3, no focal deficits  Psychiatric: Normal mood, normal affect  Skin: No rashes      LABS:                        13.4   22.4  )-----------( 118      ( 09 Feb 2018 06:30 )             40.0     02-09    140  |  103  |  8   ----------------------------<  141<H>  3.8   |  27  |  0.71    Ca    8.0<L>      09 Feb 2018 06:30  Phos  2.4     02-09  Mg     1.7     02-09    TPro  5.7<L>  /  Alb  3.0<L>  /  TBili  1.0  /  DBili  x   /  AST  33  /  ALT  21  /  AlkPhos  83  02-09    PT/INR - ( 09 Feb 2018 06:30 )   PT: 15.1 sec;   INR: 1.38 ratio         PTT - ( 09 Feb 2018 06:30 )  PTT:28.7 sec      RADIOLOGY & ADDITIONAL TESTS:    Advanced care planning was discussed with patient and family.  Advanced care planning forms were reviewed and discussed.  Risks, benefits and alternatives of gastroenterologic procedures were discussed in detail and all questions were answered.    25 minutes spent.

## 2018-02-10 LAB
ALBUMIN SERPL ELPH-MCNC: 3.1 G/DL — LOW (ref 3.3–5)
ALP SERPL-CCNC: 87 U/L — SIGNIFICANT CHANGE UP (ref 40–120)
ALT FLD-CCNC: 20 U/L — SIGNIFICANT CHANGE UP (ref 12–78)
ANION GAP SERPL CALC-SCNC: 9 MMOL/L — SIGNIFICANT CHANGE UP (ref 5–17)
AST SERPL-CCNC: 26 U/L — SIGNIFICANT CHANGE UP (ref 15–37)
BILIRUB SERPL-MCNC: 1.1 MG/DL — SIGNIFICANT CHANGE UP (ref 0.2–1.2)
BUN SERPL-MCNC: 5 MG/DL — LOW (ref 7–23)
CALCIUM SERPL-MCNC: 8.5 MG/DL — SIGNIFICANT CHANGE UP (ref 8.5–10.1)
CHLORIDE SERPL-SCNC: 102 MMOL/L — SIGNIFICANT CHANGE UP (ref 96–108)
CO2 SERPL-SCNC: 25 MMOL/L — SIGNIFICANT CHANGE UP (ref 22–31)
CREAT SERPL-MCNC: 0.6 MG/DL — SIGNIFICANT CHANGE UP (ref 0.5–1.3)
GLUCOSE SERPL-MCNC: 108 MG/DL — HIGH (ref 70–99)
HCT VFR BLD CALC: 41.9 % — SIGNIFICANT CHANGE UP (ref 39–50)
HGB BLD-MCNC: 14.1 G/DL — SIGNIFICANT CHANGE UP (ref 13–17)
LYMPHOCYTES # BLD AUTO: 60 % — HIGH (ref 13–44)
MCHC RBC-ENTMCNC: 29.9 PG — SIGNIFICANT CHANGE UP (ref 27–34)
MCHC RBC-ENTMCNC: 33.7 GM/DL — SIGNIFICANT CHANGE UP (ref 32–36)
MCV RBC AUTO: 88.8 FL — SIGNIFICANT CHANGE UP (ref 80–100)
MONOCYTES NFR BLD AUTO: 8 % — SIGNIFICANT CHANGE UP (ref 1–9)
NEUTROPHILS NFR BLD AUTO: 21 % — LOW (ref 43–77)
PLATELET # BLD AUTO: 122 K/UL — LOW (ref 150–400)
POTASSIUM SERPL-MCNC: 4 MMOL/L — SIGNIFICANT CHANGE UP (ref 3.5–5.3)
POTASSIUM SERPL-SCNC: 4 MMOL/L — SIGNIFICANT CHANGE UP (ref 3.5–5.3)
PROT SERPL-MCNC: 6.3 G/DL — SIGNIFICANT CHANGE UP (ref 6–8.3)
RBC # BLD: 4.72 M/UL — SIGNIFICANT CHANGE UP (ref 4.2–5.8)
RBC # FLD: 12.7 % — SIGNIFICANT CHANGE UP (ref 10.3–14.5)
SODIUM SERPL-SCNC: 136 MMOL/L — SIGNIFICANT CHANGE UP (ref 135–145)
WBC # BLD: 22.6 K/UL — HIGH (ref 3.8–10.5)
WBC # FLD AUTO: 22.6 K/UL — HIGH (ref 3.8–10.5)

## 2018-02-10 PROCEDURE — 99233 SBSQ HOSP IP/OBS HIGH 50: CPT

## 2018-02-10 RX ORDER — CLOPIDOGREL BISULFATE 75 MG/1
75 TABLET, FILM COATED ORAL DAILY
Qty: 0 | Refills: 0 | Status: DISCONTINUED | OUTPATIENT
Start: 2018-02-10 | End: 2018-02-15

## 2018-02-10 RX ADMIN — HYDROMORPHONE HYDROCHLORIDE 1 MILLIGRAM(S): 2 INJECTION INTRAMUSCULAR; INTRAVENOUS; SUBCUTANEOUS at 18:10

## 2018-02-10 RX ADMIN — HYDROMORPHONE HYDROCHLORIDE 1 MILLIGRAM(S): 2 INJECTION INTRAMUSCULAR; INTRAVENOUS; SUBCUTANEOUS at 10:28

## 2018-02-10 RX ADMIN — ATORVASTATIN CALCIUM 40 MILLIGRAM(S): 80 TABLET, FILM COATED ORAL at 22:43

## 2018-02-10 RX ADMIN — CLOPIDOGREL BISULFATE 75 MILLIGRAM(S): 75 TABLET, FILM COATED ORAL at 12:23

## 2018-02-10 RX ADMIN — LEVETIRACETAM 500 MILLIGRAM(S): 250 TABLET, FILM COATED ORAL at 22:43

## 2018-02-10 RX ADMIN — ALVIMOPAN 12 MILLIGRAM(S): 12 CAPSULE ORAL at 18:14

## 2018-02-10 RX ADMIN — HYDROMORPHONE HYDROCHLORIDE 1 MILLIGRAM(S): 2 INJECTION INTRAMUSCULAR; INTRAVENOUS; SUBCUTANEOUS at 04:01

## 2018-02-10 RX ADMIN — HYDROMORPHONE HYDROCHLORIDE 1 MILLIGRAM(S): 2 INJECTION INTRAMUSCULAR; INTRAVENOUS; SUBCUTANEOUS at 20:53

## 2018-02-10 RX ADMIN — PANTOPRAZOLE SODIUM 40 MILLIGRAM(S): 20 TABLET, DELAYED RELEASE ORAL at 06:02

## 2018-02-10 RX ADMIN — Medication 1000 UNIT(S): at 15:02

## 2018-02-10 RX ADMIN — GABAPENTIN 100 MILLIGRAM(S): 400 CAPSULE ORAL at 06:02

## 2018-02-10 RX ADMIN — LEVETIRACETAM 500 MILLIGRAM(S): 250 TABLET, FILM COATED ORAL at 06:02

## 2018-02-10 RX ADMIN — HYDROMORPHONE HYDROCHLORIDE 1 MILLIGRAM(S): 2 INJECTION INTRAMUSCULAR; INTRAVENOUS; SUBCUTANEOUS at 13:06

## 2018-02-10 RX ADMIN — HYDROMORPHONE HYDROCHLORIDE 1 MILLIGRAM(S): 2 INJECTION INTRAMUSCULAR; INTRAVENOUS; SUBCUTANEOUS at 08:23

## 2018-02-10 RX ADMIN — ENOXAPARIN SODIUM 40 MILLIGRAM(S): 100 INJECTION SUBCUTANEOUS at 12:23

## 2018-02-10 RX ADMIN — Medication 50 MILLIGRAM(S): at 06:02

## 2018-02-10 RX ADMIN — LEVETIRACETAM 500 MILLIGRAM(S): 250 TABLET, FILM COATED ORAL at 15:03

## 2018-02-10 RX ADMIN — HYDROMORPHONE HYDROCHLORIDE 1 MILLIGRAM(S): 2 INJECTION INTRAMUSCULAR; INTRAVENOUS; SUBCUTANEOUS at 21:08

## 2018-02-10 RX ADMIN — HYDROMORPHONE HYDROCHLORIDE 1 MILLIGRAM(S): 2 INJECTION INTRAMUSCULAR; INTRAVENOUS; SUBCUTANEOUS at 12:23

## 2018-02-10 RX ADMIN — HYDROMORPHONE HYDROCHLORIDE 1 MILLIGRAM(S): 2 INJECTION INTRAMUSCULAR; INTRAVENOUS; SUBCUTANEOUS at 16:47

## 2018-02-10 RX ADMIN — GABAPENTIN 100 MILLIGRAM(S): 400 CAPSULE ORAL at 18:14

## 2018-02-10 RX ADMIN — ALVIMOPAN 12 MILLIGRAM(S): 12 CAPSULE ORAL at 06:02

## 2018-02-10 RX ADMIN — Medication 81 MILLIGRAM(S): at 12:23

## 2018-02-10 RX ADMIN — Medication 50 MILLIGRAM(S): at 18:13

## 2018-02-10 NOTE — PROGRESS NOTE ADULT - ASSESSMENT
IMPRESSION awaiting return of bowel function  PLAN encourage OOB           check po4 level in am            wait for return of bowel function to advance diet

## 2018-02-10 NOTE — PROGRESS NOTE ADULT - SUBJECTIVE AND OBJECTIVE BOX
INTERVAL HPI/OVERNIGHT EVENTS:  States that he is having abd pain, denies nausea, + flatus, no BM      MEDICATIONS  (STANDING):  alvimopan 12 milliGRAM(s) Oral two times a day  aspirin  chewable 81 milliGRAM(s) Oral daily  atorvastatin 40 milliGRAM(s) Oral at bedtime  cholecalciferol 1000 Unit(s) Oral daily  dextrose 5% + lactated ringers. 1000 milliLiter(s) (50 mL/Hr) IV Continuous <Continuous>  enoxaparin Injectable 40 milliGRAM(s) SubCutaneous daily  gabapentin 100 milliGRAM(s) Oral two times a day  influenza   Vaccine 0.5 milliLiter(s) IntraMuscular once  levETIRAcetam 500 milliGRAM(s) Oral every 8 hours  metoprolol     tartrate 50 milliGRAM(s) Oral every 12 hours  pantoprazole    Tablet 40 milliGRAM(s) Oral before breakfast    MEDICATIONS  (PRN):  HYDROmorphone  Injectable 1 milliGRAM(s) IV Push every 4 hours PRN Severe Pain (7 - 10)  morphine  - Injectable 4 milliGRAM(s) IV Push every 4 hours PRN Moderate Pain (4 - 6)  morphine  - Injectable 1 milliGRAM(s) IV Push every 4 hours PRN Mild Pain (1 - 3)      Allergies    No Known Allergies    Intolerances        Review of Systems:    General:  No wt loss, fevers, chills, night sweats,fatigue,   Eyes:  Good vision, no reported pain  ENT:  No sore throat, pain, runny nose, dysphagia  CV:  No pain, palpitatioins, hypo/hypertension  Resp:  No dyspnea, cough, tachypnea, wheezing  GI:  No pain, No nausea, No vomiting, No diarrhea, No constipatiion, No weight loss, No fever, No pruritis, No rectal bleeding, No tarry stools, No dysphagia,  :  No pain, bleeding, incontinence, nocturia  Muscle:  No pain, weakness  Neuro:  No weakness, tingling, memory problems  Psych:  No fatigue, insomnia, mood problems, depression  Endocrine:  No polyuria, polydypsia, cold/heat intolerance  Heme:  No petechiae, ecchymosis, easy bruisability  Skin:  No rash, tattoos, scars, edema      Vital Signs Last 24 Hrs  T(C): 36.9 (10 Feb 2018 08:24), Max: 37.2 (09 Feb 2018 16:15)  T(F): 98.4 (10 Feb 2018 08:24), Max: 99 (09 Feb 2018 16:15)  HR: 82 (10 Feb 2018 08:24) (78 - 92)  BP: 156/83 (10 Feb 2018 08:24) (156/83 - 163/86)  BP(mean): --  RR: 17 (10 Feb 2018 08:24) (16 - 18)  SpO2: 97% (10 Feb 2018 08:24) (92% - 97%)    PHYSICAL EXAM:    Constitutional: NAD, well-developed  HEENT: EOMI, throat clear  Neck: No LAD, supple  Respiratory: CTA and P  Cardiovascular: S1 and S2, RRR, no M  Gastrointestinal: BS+, soft, NT/ND, neg HSM,  Extremities: No peripheral edema, neg clubing, cyanosis  Vascular: 2+ peripheral pulses  Neurological: A/O x 3, no focal deficits  Psychiatric: Normal mood, normal affect  Skin: No rashes      LABS:                                        14.1   22.6  )-----------( 122      ( 10 Feb 2018 08:51 )             41.9   02-10    136  |  102  |  5<L>  ----------------------------<  108<H>  4.0   |  25  |  0.60    Ca    8.5      10 Feb 2018 08:51  Phos  2.4     02-09  Mg     1.7     02-09    TPro  6.3  /  Alb  3.1<L>  /  TBili  1.1  /  DBili  x   /  AST  26  /  ALT  20  /  AlkPhos  87  02-10    TPro  5.7<L>  /  Alb  3.0<L>  /  TBili  1.0  /  DBili  x   /  AST  33  /  ALT  21  /  AlkPhos  83  02-09    PT/INR - ( 09 Feb 2018 06:30 )   PT: 15.1 sec;   INR: 1.38 ratio         PTT - ( 09 Feb 2018 06:30 )  PTT:28.7 sec      RADIOLOGY & ADDITIONAL TESTS:

## 2018-02-10 NOTE — PROGRESS NOTE ADULT - ASSESSMENT
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 Hsieh). Recent admission at [facility] 1/22 - 1/26 admitted for G.I. bleed in which CT abd demonstrated extensive sigmoid colon diverticulitis with sigmoid submucosal mass, and obtained a colonoscopy with bx which demonstrated tubular adenoma. Pt was re admitted 2/2 for planned sigmoid resection which was performed today. Initial Consult and admission to ICU s/p Laparoscopic assisted low anterior resection with take down of splenic flexure , now on surgical floor      Plan:  #Neuro:  resume keppra 500 mg IV q 8 hrs (when able to take p.o. resume p.o. dose)  sz precautions, asp precautions  neuro checks q 4 hrs and prn for changes  pain med with morphine 2 mg q 2 hrs prn  ambulate as tolerated if ok with surgery    #Pulm:  supplemental O2 to maintain spo2>92%  incentive spirometry 10x q 2 hrs   HOB>/= 30 degree angle    #CV: CAD, HTN, HLD  stable  restarted plavix on 2/10/18 POD #2  assure adequate pain control  when able to take p.o. resume metoprolol 50 mg p.o. q 12 hr; plavix 75 mg po qd, ASA 81 mg po qd (will start today); atorvastatin 40 mg qhs  (TTE with EF 65%; mild diastolic dysfunction)    #G.I./: GERD  diet as per surgery currently clears  pt on protonix on home meds, will give IV 40 mg qd at present  strict I & O's keep even     #I.D.:  empiric coverage completed as below  keflex 2gms IV q 8 hrs x 3 doses  flagyl 500 mg IV q 8 hrs x 3 doses    #FEN/ENDO/HEME:  CBC with diff and CMP in AM  leukocytosis likely reactive trending down, h/h stable  cont ivf  replete lytes prn  Lovenox sc for prophylaxis

## 2018-02-10 NOTE — PROGRESS NOTE ADULT - SUBJECTIVE AND OBJECTIVE BOX
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 Hsieh). [facility] 1[facility]26 admitted for G.I. bleed in which CT abd demonstrated extensive sigmoid colon diverticulitis with sigmoid submucosal mass, and obtained a colonoscopy with bx which demonstrated tubular adenoma. Pt was re admitted 2/2 for planned sigmoid resection which was performed today. Initial Consult and admission to ICU s/p Laparoscopic assisted low anterior resection with take down of splenic flexure , now on surgical floor.    Pain Location & Control: not controlled     MEDICATIONS  (STANDING):  alvimopan 12 milliGRAM(s) Oral two times a day  aspirin  chewable 81 milliGRAM(s) Oral daily  atorvastatin 40 milliGRAM(s) Oral at bedtime  cholecalciferol 1000 Unit(s) Oral daily  clopidogrel Tablet 75 milliGRAM(s) Oral daily  dextrose 5% + lactated ringers. 1000 milliLiter(s) (50 mL/Hr) IV Continuous <Continuous>  enoxaparin Injectable 40 milliGRAM(s) SubCutaneous daily  gabapentin 100 milliGRAM(s) Oral two times a day  influenza   Vaccine 0.5 milliLiter(s) IntraMuscular once  levETIRAcetam 500 milliGRAM(s) Oral every 8 hours  metoprolol     tartrate 50 milliGRAM(s) Oral every 12 hours  pantoprazole    Tablet 40 milliGRAM(s) Oral before breakfast    MEDICATIONS  (PRN):  HYDROmorphone  Injectable 1 milliGRAM(s) IV Push every 4 hours PRN Severe Pain (7 - 10)  morphine  - Injectable 4 milliGRAM(s) IV Push every 4 hours PRN Moderate Pain (4 - 6)  morphine  - Injectable 1 milliGRAM(s) IV Push every 4 hours PRN Mild Pain (1 - 3)      Allergies    No Known Allergies    Intolerances        REVIEW OF SYSTEMS:  CONSTITUTIONAL: No fever, weight loss, or fatigue  EYES: No eye pain, visual disturbances, or discharge  ENMT:  No difficulty hearing, tinnitus, vertigo; No sinus or throat pain  NECK: No pain or stiffness  BREASTS: No pain, masses, or nipple discharge  RESPIRATORY: No cough, wheezing, chills or hemoptysis; No shortness of breath  CARDIOVASCULAR: No chest pain, palpitations, dizziness, or leg swelling  GASTROINTESTINAL: No abdominal or epigastric pain. No nausea, vomiting, or hematemesis; No diarrhea or constipation. No melena or hematochezia.  GENITOURINARY: No dysuria, frequency, hematuria, or incontinence  NEUROLOGICAL: No headaches, memory loss, loss of strength, numbness, or tremors  SKIN: No itching, burning, rashes, or lesions   LYMPH NODES: No enlarged glands  ENDOCRINE: No heat or cold intolerance; No hair loss; No polydipsia or polyuria  MUSCULOSKELETAL: No back pain  PSYCHIATRIC: No depression, anxiety, mood swings, or difficulty sleeping  HEME/LYMPH: No easy bruising, or bleeding gums  ALLERGY AND IMMUNOLOGIC: No hives or eczema    Vital Signs Last 24 Hrs  T(C): 37.3 (10 Feb 2018 16:30), Max: 37.3 (10 Feb 2018 16:30)  T(F): 99.1 (10 Feb 2018 16:30), Max: 99.1 (10 Feb 2018 16:30)  HR: 82 (10 Feb 2018 16:30) (82 - 92)  BP: 172/79 (10 Feb 2018 16:30) (156/83 - 172/79)  BP(mean): --  RR: 17 (10 Feb 2018 16:30) (17 - 17)  SpO2: 93% (10 Feb 2018 16:30) (93% - 97%)    PHYSICAL EXAM:  GENERAL: NAD, well-groomed, well-developed  HEAD:  Atraumatic, Normocephalic  EYES: EOMI, PERRLA, conjunctiva and sclera clear  ENMT: No tonsillar erythema, exudates, or enlargement; Moist mucous membranes, Good dentition, No lesions  NECK: Supple, No JVD, Normal thyroid  NERVOUS SYSTEM:  Alert & Oriented X3, Good concentration; Motor Strength 5/5 B/L upper and lower extremities; DTRs 2+ intact and symmetric  CHEST/LUNG: Clear to auscultation bilaterally; No rales, rhonchi, wheezing, or rubs  HEART: Regular rate and rhythm; No murmurs, rubs, or gallops  ABDOMEN: Soft, tender, Nondistended; Bowel sounds present  EXTREMITIES:  2+ Peripheral Pulses, No clubbing or cyanosis  LYMPH: No lymphadenopathy noted  SKIN: No rashes or lesions  INCISION:  Dressing dry and intact    LABS:                        14.1   22.6  )-----------( 122      ( 10 Feb 2018 08:51 )             41.9     10 Feb 2018 08:51    136    |  102    |  5      ----------------------------<  108    4.0     |  25     |  0.60     Ca    8.5        10 Feb 2018 08:51    TPro  6.3    /  Alb  3.1    /  TBili  1.1    /  DBili  x      /  AST  26     /  ALT  20     /  AlkPhos  87     10 Feb 2018 08:51    PT/INR - ( 09 Feb 2018 06:30 )   PT: 15.1 sec;   INR: 1.38 ratio         PTT - ( 09 Feb 2018 06:30 )  PTT:28.7 sec    CAPILLARY BLOOD GLUCOSE          RADIOLOGY & ADDITIONAL TESTS:    Imaging Personally Reviewed: CT abdomen   [x] YES  [ ] NO    Consultant(s) Notes Reviewed:  [x ] YES  [ ] NO    Care Discussed with Consultants/Other Providers [x ] YES  [ ] NO

## 2018-02-10 NOTE — PROGRESS NOTE ADULT - SUBJECTIVE AND OBJECTIVE BOX
Subjective: Pt c/o incisional pain, without flatus or BM.    Objective:  Vital Signs Last 24 Hrs  T(C): 36.9 (10 Feb 2018 08:24), Max: 37.2 (09 Feb 2018 16:15)  T(F): 98.4 (10 Feb 2018 08:24), Max: 99 (09 Feb 2018 16:15)  HR: 82 (10 Feb 2018 08:24) (78 - 92)  BP: 156/83 (10 Feb 2018 08:24) (133/63 - 163/86)  BP(mean): 92 (09 Feb 2018 10:00) (92 - 92)  RR: 17 (10 Feb 2018 08:24) (16 - 18)  SpO2: 97% (10 Feb 2018 08:24) (92% - 97%)    Heent: STANLEY, FREOM  Pul: decreased at bases  Cor: RSR, without murmurs  Abdomen: decreased bowel sounds, with distension and tenderness  Extremities: without edema, motor/sensory intact, without calf pain, Homans sign negative.                        14.1   22.6  )-----------( 122      ( 10 Feb 2018 08:51 )             41.9       02-10    136  |  102  |  5<L>  ----------------------------<  108<H>  4.0   |  25  |  0.60    Ca    8.5      10 Feb 2018 08:51  Phos  2.4     02-09  Mg     1.7     02-09    TPro  x   /  Alb  3.1<L>  /  TBili  x   /  DBili  x   /  AST  26  /  ALT  20  /  AlkPhos  x   02-10        02-09 @ 07:01  -  02-10 @ 07:00  --------------------------------------------------------  IN:    dextrose 5% + lactated ringers.: 150 mL    dextrose 5% + lactated ringers.: 50 mL    Oral Fluid: 120 mL    Solution: 100 mL    Solution: 100 mL    Solution: 50 mL    Solution: 62.5 mL  Total IN: 632.5 mL    OUT:    Incontinent per Condom Catheter: 1200 mL    Indwelling Catheter - Urethral: 175 mL  Total OUT: 1375 mL    Total NET: -742.5 mL

## 2018-02-11 LAB
ALBUMIN SERPL ELPH-MCNC: 3 G/DL — LOW (ref 3.3–5)
ALP SERPL-CCNC: 82 U/L — SIGNIFICANT CHANGE UP (ref 40–120)
ALT FLD-CCNC: 22 U/L — SIGNIFICANT CHANGE UP (ref 12–78)
ANION GAP SERPL CALC-SCNC: 11 MMOL/L — SIGNIFICANT CHANGE UP (ref 5–17)
AST SERPL-CCNC: 23 U/L — SIGNIFICANT CHANGE UP (ref 15–37)
BILIRUB SERPL-MCNC: 1 MG/DL — SIGNIFICANT CHANGE UP (ref 0.2–1.2)
BUN SERPL-MCNC: 6 MG/DL — LOW (ref 7–23)
CALCIUM SERPL-MCNC: 8.8 MG/DL — SIGNIFICANT CHANGE UP (ref 8.5–10.1)
CHLORIDE SERPL-SCNC: 102 MMOL/L — SIGNIFICANT CHANGE UP (ref 96–108)
CO2 SERPL-SCNC: 24 MMOL/L — SIGNIFICANT CHANGE UP (ref 22–31)
CREAT SERPL-MCNC: 0.53 MG/DL — SIGNIFICANT CHANGE UP (ref 0.5–1.3)
GLUCOSE SERPL-MCNC: 119 MG/DL — HIGH (ref 70–99)
HCT VFR BLD CALC: 46.5 % — SIGNIFICANT CHANGE UP (ref 39–50)
HGB BLD-MCNC: 15.2 G/DL — SIGNIFICANT CHANGE UP (ref 13–17)
MAGNESIUM SERPL-MCNC: 1.9 MG/DL — SIGNIFICANT CHANGE UP (ref 1.6–2.6)
MCHC RBC-ENTMCNC: 29.1 PG — SIGNIFICANT CHANGE UP (ref 27–34)
MCHC RBC-ENTMCNC: 32.8 GM/DL — SIGNIFICANT CHANGE UP (ref 32–36)
MCV RBC AUTO: 88.6 FL — SIGNIFICANT CHANGE UP (ref 80–100)
PHOSPHATE SERPL-MCNC: 2.3 MG/DL — LOW (ref 2.5–4.5)
PLATELET # BLD AUTO: 142 K/UL — LOW (ref 150–400)
POTASSIUM SERPL-MCNC: 3.8 MMOL/L — SIGNIFICANT CHANGE UP (ref 3.5–5.3)
POTASSIUM SERPL-SCNC: 3.8 MMOL/L — SIGNIFICANT CHANGE UP (ref 3.5–5.3)
PROT SERPL-MCNC: 6.6 G/DL — SIGNIFICANT CHANGE UP (ref 6–8.3)
RBC # BLD: 5.24 M/UL — SIGNIFICANT CHANGE UP (ref 4.2–5.8)
RBC # FLD: 12.8 % — SIGNIFICANT CHANGE UP (ref 10.3–14.5)
SODIUM SERPL-SCNC: 137 MMOL/L — SIGNIFICANT CHANGE UP (ref 135–145)
WBC # BLD: 22 K/UL — HIGH (ref 3.8–10.5)
WBC # FLD AUTO: 22 K/UL — HIGH (ref 3.8–10.5)

## 2018-02-11 PROCEDURE — 74019 RADEX ABDOMEN 2 VIEWS: CPT | Mod: 26

## 2018-02-11 PROCEDURE — 99233 SBSQ HOSP IP/OBS HIGH 50: CPT

## 2018-02-11 RX ORDER — HYDROMORPHONE HYDROCHLORIDE 2 MG/ML
0.5 INJECTION INTRAMUSCULAR; INTRAVENOUS; SUBCUTANEOUS EVERY 4 HOURS
Qty: 0 | Refills: 0 | Status: DISCONTINUED | OUTPATIENT
Start: 2018-02-11 | End: 2018-02-15

## 2018-02-11 RX ADMIN — SODIUM CHLORIDE 50 MILLILITER(S): 9 INJECTION, SOLUTION INTRAVENOUS at 21:29

## 2018-02-11 RX ADMIN — ENOXAPARIN SODIUM 40 MILLIGRAM(S): 100 INJECTION SUBCUTANEOUS at 11:34

## 2018-02-11 RX ADMIN — Medication 1000 UNIT(S): at 11:34

## 2018-02-11 RX ADMIN — Medication 81 MILLIGRAM(S): at 11:34

## 2018-02-11 RX ADMIN — HYDROMORPHONE HYDROCHLORIDE 1 MILLIGRAM(S): 2 INJECTION INTRAMUSCULAR; INTRAVENOUS; SUBCUTANEOUS at 06:49

## 2018-02-11 RX ADMIN — CLOPIDOGREL BISULFATE 75 MILLIGRAM(S): 75 TABLET, FILM COATED ORAL at 11:34

## 2018-02-11 RX ADMIN — HYDROMORPHONE HYDROCHLORIDE 1 MILLIGRAM(S): 2 INJECTION INTRAMUSCULAR; INTRAVENOUS; SUBCUTANEOUS at 01:31

## 2018-02-11 RX ADMIN — HYDROMORPHONE HYDROCHLORIDE 0.5 MILLIGRAM(S): 2 INJECTION INTRAMUSCULAR; INTRAVENOUS; SUBCUTANEOUS at 11:05

## 2018-02-11 RX ADMIN — GABAPENTIN 100 MILLIGRAM(S): 400 CAPSULE ORAL at 17:26

## 2018-02-11 RX ADMIN — GABAPENTIN 100 MILLIGRAM(S): 400 CAPSULE ORAL at 06:34

## 2018-02-11 RX ADMIN — LEVETIRACETAM 500 MILLIGRAM(S): 250 TABLET, FILM COATED ORAL at 06:34

## 2018-02-11 RX ADMIN — Medication 62.5 MILLIMOLE(S): at 11:34

## 2018-02-11 RX ADMIN — LEVETIRACETAM 500 MILLIGRAM(S): 250 TABLET, FILM COATED ORAL at 21:30

## 2018-02-11 RX ADMIN — ALVIMOPAN 12 MILLIGRAM(S): 12 CAPSULE ORAL at 06:34

## 2018-02-11 RX ADMIN — ATORVASTATIN CALCIUM 40 MILLIGRAM(S): 80 TABLET, FILM COATED ORAL at 21:30

## 2018-02-11 RX ADMIN — HYDROMORPHONE HYDROCHLORIDE 0.5 MILLIGRAM(S): 2 INJECTION INTRAMUSCULAR; INTRAVENOUS; SUBCUTANEOUS at 14:57

## 2018-02-11 RX ADMIN — HYDROMORPHONE HYDROCHLORIDE 1 MILLIGRAM(S): 2 INJECTION INTRAMUSCULAR; INTRAVENOUS; SUBCUTANEOUS at 01:46

## 2018-02-11 RX ADMIN — HYDROMORPHONE HYDROCHLORIDE 0.5 MILLIGRAM(S): 2 INJECTION INTRAMUSCULAR; INTRAVENOUS; SUBCUTANEOUS at 10:48

## 2018-02-11 RX ADMIN — Medication 50 MILLIGRAM(S): at 06:34

## 2018-02-11 RX ADMIN — HYDROMORPHONE HYDROCHLORIDE 0.5 MILLIGRAM(S): 2 INJECTION INTRAMUSCULAR; INTRAVENOUS; SUBCUTANEOUS at 15:10

## 2018-02-11 RX ADMIN — ALVIMOPAN 12 MILLIGRAM(S): 12 CAPSULE ORAL at 17:26

## 2018-02-11 RX ADMIN — Medication 50 MILLIGRAM(S): at 17:26

## 2018-02-11 RX ADMIN — HYDROMORPHONE HYDROCHLORIDE 1 MILLIGRAM(S): 2 INJECTION INTRAMUSCULAR; INTRAVENOUS; SUBCUTANEOUS at 06:34

## 2018-02-11 RX ADMIN — LEVETIRACETAM 500 MILLIGRAM(S): 250 TABLET, FILM COATED ORAL at 14:54

## 2018-02-11 RX ADMIN — PANTOPRAZOLE SODIUM 40 MILLIGRAM(S): 20 TABLET, DELAYED RELEASE ORAL at 06:34

## 2018-02-11 NOTE — PROGRESS NOTE ADULT - SUBJECTIVE AND OBJECTIVE BOX
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 Hsieh). Recent admission at [facility] 1/22 - 1/26 admitted for G.I. bleed in which CT abd demonstrated extensive sigmoid colon diverticulitis with sigmoid submucosal mass, and obtained a colonoscopy with bx which demonstrated tubular adenoma. Pt was re admitted 2/2 for planned sigmoid resection which was performed today. Initial Consult and admission to ICU s/p Laparoscopic assisted low anterior resection with take down of splenic flexure , now on surgical floor    Pain Location & Control: pain controlled     MEDICATIONS  (STANDING):  alvimopan 12 milliGRAM(s) Oral two times a day  aspirin  chewable 81 milliGRAM(s) Oral daily  atorvastatin 40 milliGRAM(s) Oral at bedtime  cholecalciferol 1000 Unit(s) Oral daily  clopidogrel Tablet 75 milliGRAM(s) Oral daily  dextrose 5% + lactated ringers. 1000 milliLiter(s) (50 mL/Hr) IV Continuous <Continuous>  enoxaparin Injectable 40 milliGRAM(s) SubCutaneous daily  gabapentin 100 milliGRAM(s) Oral two times a day  influenza   Vaccine 0.5 milliLiter(s) IntraMuscular once  levETIRAcetam 500 milliGRAM(s) Oral every 8 hours  metoprolol     tartrate 50 milliGRAM(s) Oral every 12 hours  pantoprazole    Tablet 40 milliGRAM(s) Oral before breakfast    MEDICATIONS  (PRN):  HYDROmorphone  Injectable 0.5 milliGRAM(s) IV Push every 4 hours PRN Severe Pain (7 - 10)  morphine  - Injectable 4 milliGRAM(s) IV Push every 4 hours PRN Moderate Pain (4 - 6)  morphine  - Injectable 1 milliGRAM(s) IV Push every 4 hours PRN Mild Pain (1 - 3)      Allergies    No Known Allergies    Intolerances        REVIEW OF SYSTEMS:  CONSTITUTIONAL: No fever, weight loss, or fatigue  EYES: No eye pain, visual disturbances, or discharge  ENMT:  No difficulty hearing, tinnitus, vertigo; No sinus or throat pain  NECK: No pain or stiffness  BREASTS: No pain, masses, or nipple discharge  RESPIRATORY: No cough, wheezing, chills or hemoptysis; No shortness of breath  CARDIOVASCULAR: No chest pain, palpitations, dizziness, or leg swelling  GASTROINTESTINAL: No abdominal or epigastric pain. No nausea, vomiting, or hematemesis; No diarrhea or constipation. No melena or hematochezia.  GENITOURINARY: No dysuria, frequency, hematuria, or incontinence  NEUROLOGICAL: No headaches, memory loss, loss of strength, numbness, or tremors  SKIN: No itching, burning, rashes, or lesions   LYMPH NODES: No enlarged glands  ENDOCRINE: No heat or cold intolerance; No hair loss; No polydipsia or polyuria  MUSCULOSKELETAL: No back pain  PSYCHIATRIC: No depression, anxiety, mood swings, or difficulty sleeping  HEME/LYMPH: No easy bruising, or bleeding gums  ALLERGY AND IMMUNOLOGIC: No hives or eczema    Vital Signs Last 24 Hrs  T(C): 36.5 (11 Feb 2018 13:52), Max: 37.3 (10 Feb 2018 16:30)  T(F): 97.7 (11 Feb 2018 13:52), Max: 99.1 (10 Feb 2018 16:30)  HR: 87 (11 Feb 2018 13:52) (78 - 87)  BP: 161/80 (11 Feb 2018 13:52) (147/84 - 172/79)  BP(mean): --  RR: 16 (11 Feb 2018 13:52) (16 - 17)  SpO2: 92% (11 Feb 2018 13:52) (92% - 96%)    PHYSICAL EXAM:  GENERAL: NAD, well-groomed, well-developed  HEAD:  Atraumatic, Normocephalic  EYES: EOMI, PERRLA, conjunctiva and sclera clear  ENMT: No tonsillar erythema, exudates, or enlargement; Moist mucous membranes, Good dentition, No lesions  NECK: Supple, No JVD, Normal thyroid  NERVOUS SYSTEM:  Alert & Oriented X3, Good concentration; Motor Strength 5/5 B/L upper and lower extremities; DTRs 2+ intact and symmetric  CHEST/LUNG: Clear to auscultation bilaterally; No rales, rhonchi, wheezing, or rubs  HEART: Regular rate and rhythm; No murmurs, rubs, or gallops  ABDOMEN: Soft, tender, Nondistended; Bowel sounds present  EXTREMITIES:  2+ Peripheral Pulses, No clubbing or cyanosis  LYMPH: No lymphadenopathy noted  SKIN: No rashes or lesions  INCISION:  Dressing dry and intact    LABS:                        15.2   22.0  )-----------( 142      ( 11 Feb 2018 08:38 )             46.5     11 Feb 2018 08:38    137    |  102    |  6      ----------------------------<  119    3.8     |  24     |  0.53     Ca    8.8        11 Feb 2018 08:38  Phos  2.3       11 Feb 2018 08:38  Mg     1.9       11 Feb 2018 08:38    TPro  6.6    /  Alb  3.0    /  TBili  1.0    /  DBili  x      /  AST  23     /  ALT  22     /  AlkPhos  82     11 Feb 2018 08:38        CAPILLARY BLOOD GLUCOSE          RADIOLOGY & ADDITIONAL TESTS:    Imaging Personally Reviewed:  [ ] YES  [ ] NO    Consultant(s) Notes Reviewed:  [x ] YES  [ ] NO    Care Discussed with Consultants/Other Providers [x ] YES  [ ] NO

## 2018-02-11 NOTE — PROGRESS NOTE ADULT - ASSESSMENT
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 Hsieh). Recent admission at [facility] 1/22 - 1/26 admitted for G.I. bleed in which CT abd demonstrated extensive sigmoid colon diverticulitis with sigmoid submucosal mass, and obtained a colonoscopy with bx which demonstrated tubular adenoma. Pt was re admitted 2/2 for planned sigmoid resection which was performed today. Initial Consult and admission to ICU s/p Laparoscopic assisted low anterior resection with take down of splenic flexure , now on surgical floor      Plan:  #Neuro:  resume keppra 500 mg IV q 8 hrs (when able to take p.o. resume p.o. dose)  sz precautions, asp precautions  neuro checks q 4 hrs and prn for changes  pain med with morphine 2 mg q 2 hrs prn  ambulate as tolerated if ok with surgery    #Pulm:  supplemental O2 to maintain spo2>92%  incentive spirometry 10x q 2 hrs   HOB>/= 30 degree angle    #CV: CAD, HTN, HLD  stable  restarted plavix on 2/10/18 POD #2  assure adequate pain control  when able to take p.o. resume metoprolol 50 mg p.o. q 12 hr; plavix 75 mg po qd, ASA 81 mg po qd (will start today); atorvastatin 40 mg qhs  (TTE with EF 65%; mild diastolic dysfunction)    #G.I./: GERD  diet as per surgery currently clears  pt on protonix on home meds, will give IV 40 mg qd at present  strict I & O's keep even     #I.D.:  empiric coverage completed as below  keflex 2gms IV q 8 hrs x 3 doses  flagyl 500 mg IV q 8 hrs x 3 doses    #FEN/ENDO/HEME:  CBC with diff and CMP in AM  leukocytosis likely reactive trending down, h/h stable  cont ivf  replete lytes prn  Lovenox sc for prophylaxis    Hypophosphatemia, corrected with IV phosphate.     Abdominal distension, surgical team keep him NPO and check  X ray.

## 2018-02-11 NOTE — PROGRESS NOTE ADULT - SUBJECTIVE AND OBJECTIVE BOX
Subjective: Pt c/o abdominal pain, without nausea or vomiting.    Objective:  Vital Signs Last 24 Hrs  T(C): 36.9 (11 Feb 2018 07:46), Max: 37.3 (10 Feb 2018 16:30)  T(F): 98.4 (11 Feb 2018 07:46), Max: 99.1 (10 Feb 2018 16:30)  HR: 80 (11 Feb 2018 07:46) (78 - 84)  BP: 149/106 (11 Feb 2018 07:46) (147/84 - 172/79)  BP(mean): --  RR: 16 (11 Feb 2018 07:46) (16 - 17)  SpO2: 96% (11 Feb 2018 07:46) (93% - 96%)    Heent: TRINH ANGEL  Pul: clear  Cor: RSR, without murmurs  Abdomen: with distension, and tenderness, decrease bowel sounds  Incisions clean and closed  Extremities: without edema, motor/sensory intact, without calf pain, Homans sign negative.                        15.2   22.0  )-----------( 142      ( 11 Feb 2018 08:38 )             46.5       02-11    137  |  102  |  6<L>  ----------------------------<  119<H>  3.8   |  24  |  0.53    Ca    8.8      11 Feb 2018 08:38  Phos  2.3     02-11  Mg     1.9     02-11    TPro  6.6  /  Alb  3.0<L>  /  TBili  1.0  /  DBili  x   /  AST  23  /  ALT  22  /  AlkPhos  82  02-11        02-10 @ 07:01  -  02-11 @ 07:00  --------------------------------------------------------  IN:    Oral Fluid: 350 mL  Total IN: 350 mL    OUT:    Voided: 850 mL  Total OUT: 850 mL    Total NET: -500 mL

## 2018-02-11 NOTE — PROGRESS NOTE ADULT - SUBJECTIVE AND OBJECTIVE BOX
INTERVAL HPI/OVERNIGHT EVENTS:  States that he is having abd pain, denies nausea, + flatus yesterday        MEDICATIONS  (STANDING):  alvimopan 12 milliGRAM(s) Oral two times a day  aspirin  chewable 81 milliGRAM(s) Oral daily  atorvastatin 40 milliGRAM(s) Oral at bedtime  cholecalciferol 1000 Unit(s) Oral daily  dextrose 5% + lactated ringers. 1000 milliLiter(s) (50 mL/Hr) IV Continuous <Continuous>  enoxaparin Injectable 40 milliGRAM(s) SubCutaneous daily  gabapentin 100 milliGRAM(s) Oral two times a day  influenza   Vaccine 0.5 milliLiter(s) IntraMuscular once  levETIRAcetam 500 milliGRAM(s) Oral every 8 hours  metoprolol     tartrate 50 milliGRAM(s) Oral every 12 hours  pantoprazole    Tablet 40 milliGRAM(s) Oral before breakfast    MEDICATIONS  (PRN):  HYDROmorphone  Injectable 1 milliGRAM(s) IV Push every 4 hours PRN Severe Pain (7 - 10)  morphine  - Injectable 4 milliGRAM(s) IV Push every 4 hours PRN Moderate Pain (4 - 6)  morphine  - Injectable 1 milliGRAM(s) IV Push every 4 hours PRN Mild Pain (1 - 3)      Allergies    No Known Allergies    Intolerances        Review of Systems:    General:  No wt loss, fevers, chills, night sweats,fatigue,   Eyes:  Good vision, no reported pain  ENT:  No sore throat, pain, runny nose, dysphagia  CV:  No pain, palpitatioins, hypo/hypertension  Resp:  No dyspnea, cough, tachypnea, wheezing  GI:  No pain, No nausea, No vomiting, No diarrhea, No constipatiion, No weight loss, No fever, No pruritis, No rectal bleeding, No tarry stools, No dysphagia,  :  No pain, bleeding, incontinence, nocturia  Muscle:  No pain, weakness  Neuro:  No weakness, tingling, memory problems  Psych:  No fatigue, insomnia, mood problems, depression  Endocrine:  No polyuria, polydypsia, cold/heat intolerance  Heme:  No petechiae, ecchymosis, easy bruisability  Skin:  No rash, tattoos, scars, edema      Vital Signs Last 24 Hrs  T(C): 36.9 (11 Feb 2018 07:46), Max: 37.3 (10 Feb 2018 16:30)  T(F): 98.4 (11 Feb 2018 07:46), Max: 99.1 (10 Feb 2018 16:30)  HR: 80 (11 Feb 2018 07:46) (78 - 84)  BP: 149/106 (11 Feb 2018 07:46) (147/84 - 172/79)  BP(mean): --  RR: 16 (11 Feb 2018 07:46) (16 - 17)  SpO2: 96% (11 Feb 2018 07:46) (93% - 96%)    PHYSICAL EXAM:    Constitutional: NAD, well-developed  HEENT: EOMI, throat clear  Neck: No LAD, supple  Respiratory: CTA and P  Cardiovascular: S1 and S2, RRR, no M  Gastrointestinal: Distended, raciel incisional tenderness, neg HSM,  Extremities: No peripheral edema, neg clubing, cyanosis  Vascular: 2+ peripheral pulses  Neurological: A/O x 3, no focal deficits  Psychiatric: Normal mood, normal affect  Skin: No rashes      LABS:                                   15.2   22.0  )-----------( 142      ( 11 Feb 2018 08:38 )             46.5   02-11    137  |  102  |  6<L>  ----------------------------<  119<H>  3.8   |  24  |  0.53    Ca    8.8      11 Feb 2018 08:38  Phos  2.3     02-11  Mg     1.9     02-11

## 2018-02-12 DIAGNOSIS — K91.30 POSTPROCEDURAL INTESTINAL OBSTRUCTION, UNSPECIFIED AS TO PARTIAL VERSUS COMPLETE: ICD-10-CM

## 2018-02-12 LAB
ANION GAP SERPL CALC-SCNC: 12 MMOL/L — SIGNIFICANT CHANGE UP (ref 5–17)
BUN SERPL-MCNC: 10 MG/DL — SIGNIFICANT CHANGE UP (ref 7–23)
CALCIUM SERPL-MCNC: 8.8 MG/DL — SIGNIFICANT CHANGE UP (ref 8.5–10.1)
CHLORIDE SERPL-SCNC: 104 MMOL/L — SIGNIFICANT CHANGE UP (ref 96–108)
CO2 SERPL-SCNC: 23 MMOL/L — SIGNIFICANT CHANGE UP (ref 22–31)
CREAT SERPL-MCNC: 0.63 MG/DL — SIGNIFICANT CHANGE UP (ref 0.5–1.3)
GLUCOSE SERPL-MCNC: 116 MG/DL — HIGH (ref 70–99)
HCT VFR BLD CALC: 42.1 % — SIGNIFICANT CHANGE UP (ref 39–50)
HGB BLD-MCNC: 14.5 G/DL — SIGNIFICANT CHANGE UP (ref 13–17)
MCHC RBC-ENTMCNC: 29.8 PG — SIGNIFICANT CHANGE UP (ref 27–34)
MCHC RBC-ENTMCNC: 34.4 GM/DL — SIGNIFICANT CHANGE UP (ref 32–36)
MCV RBC AUTO: 86.7 FL — SIGNIFICANT CHANGE UP (ref 80–100)
PHOSPHATE SERPL-MCNC: 3.1 MG/DL — SIGNIFICANT CHANGE UP (ref 2.5–4.5)
PLATELET # BLD AUTO: 160 K/UL — SIGNIFICANT CHANGE UP (ref 150–400)
POTASSIUM SERPL-MCNC: 3.6 MMOL/L — SIGNIFICANT CHANGE UP (ref 3.5–5.3)
POTASSIUM SERPL-SCNC: 3.6 MMOL/L — SIGNIFICANT CHANGE UP (ref 3.5–5.3)
RBC # BLD: 4.86 M/UL — SIGNIFICANT CHANGE UP (ref 4.2–5.8)
RBC # FLD: 12.8 % — SIGNIFICANT CHANGE UP (ref 10.3–14.5)
SODIUM SERPL-SCNC: 139 MMOL/L — SIGNIFICANT CHANGE UP (ref 135–145)
WBC # BLD: 24 K/UL — HIGH (ref 3.8–10.5)
WBC # FLD AUTO: 24 K/UL — HIGH (ref 3.8–10.5)

## 2018-02-12 PROCEDURE — 74018 RADEX ABDOMEN 1 VIEW: CPT | Mod: 26

## 2018-02-12 PROCEDURE — 99233 SBSQ HOSP IP/OBS HIGH 50: CPT

## 2018-02-12 RX ADMIN — Medication 81 MILLIGRAM(S): at 12:57

## 2018-02-12 RX ADMIN — LEVETIRACETAM 500 MILLIGRAM(S): 250 TABLET, FILM COATED ORAL at 22:05

## 2018-02-12 RX ADMIN — LEVETIRACETAM 500 MILLIGRAM(S): 250 TABLET, FILM COATED ORAL at 13:00

## 2018-02-12 RX ADMIN — Medication 50 MILLIGRAM(S): at 06:05

## 2018-02-12 RX ADMIN — ALVIMOPAN 12 MILLIGRAM(S): 12 CAPSULE ORAL at 17:53

## 2018-02-12 RX ADMIN — ATORVASTATIN CALCIUM 40 MILLIGRAM(S): 80 TABLET, FILM COATED ORAL at 22:05

## 2018-02-12 RX ADMIN — LEVETIRACETAM 500 MILLIGRAM(S): 250 TABLET, FILM COATED ORAL at 06:05

## 2018-02-12 RX ADMIN — CLOPIDOGREL BISULFATE 75 MILLIGRAM(S): 75 TABLET, FILM COATED ORAL at 12:57

## 2018-02-12 RX ADMIN — Medication 1000 UNIT(S): at 12:57

## 2018-02-12 RX ADMIN — HYDROMORPHONE HYDROCHLORIDE 0.5 MILLIGRAM(S): 2 INJECTION INTRAMUSCULAR; INTRAVENOUS; SUBCUTANEOUS at 01:00

## 2018-02-12 RX ADMIN — PANTOPRAZOLE SODIUM 40 MILLIGRAM(S): 20 TABLET, DELAYED RELEASE ORAL at 06:05

## 2018-02-12 RX ADMIN — Medication 50 MILLIGRAM(S): at 17:53

## 2018-02-12 RX ADMIN — GABAPENTIN 100 MILLIGRAM(S): 400 CAPSULE ORAL at 06:05

## 2018-02-12 RX ADMIN — HYDROMORPHONE HYDROCHLORIDE 0.5 MILLIGRAM(S): 2 INJECTION INTRAMUSCULAR; INTRAVENOUS; SUBCUTANEOUS at 00:21

## 2018-02-12 RX ADMIN — ENOXAPARIN SODIUM 40 MILLIGRAM(S): 100 INJECTION SUBCUTANEOUS at 12:57

## 2018-02-12 RX ADMIN — ALVIMOPAN 12 MILLIGRAM(S): 12 CAPSULE ORAL at 06:05

## 2018-02-12 RX ADMIN — GABAPENTIN 100 MILLIGRAM(S): 400 CAPSULE ORAL at 17:53

## 2018-02-12 NOTE — PROGRESS NOTE ADULT - ASSESSMENT
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 Hsieh). Recent admission at [facility] 1/22 - 1/26 admitted for G.I. bleed in which CT abd demonstrated extensive sigmoid colon diverticulitis with sigmoid submucosal mass, and obtained a colonoscopy with bx which demonstrated tubular adenoma. Pt was re admitted 2/2 for planned sigmoid resection which was performed today. Initial Consult and admission to ICU s/p Laparoscopic assisted low anterior resection with take down of splenic flexure , now on surgical floor      Plan:  #Neuro:  resume keppra 500 mg IV q 8 hrs (when able to take p.o. resume p.o. dose)  sz precautions, asp precautions  neuro checks q 4 hrs and prn for changes  pain med with morphine 2 mg q 2 hrs prn and Dilaudid prn.   ambulate as tolerated if ok with surgery    #Pulm:  supplemental O2 to maintain spo2>92%  incentive spirometry 10x q 2 hrs   HOB>/= 30 degree angle    #CV: CAD, HTN, HLD  stable  restarted plavix on 2/10/18 POD #2  assure adequate pain control  when able to take p.o. resume metoprolol 50 mg p.o. q 12 hr; plavix 75 mg po qd, ASA 81 mg po qd (will start today); atorvastatin 40 mg qhs  (TTE with EF 65%; mild diastolic dysfunction)    #G.I./: GERD  diet as per surgery currently clears  pt on protonix on home meds, will give IV 40 mg qd at present  strict I & O's keep even     #I.D.:  empiric coverage completed as below  keflex 2gms IV q 8 hrs x 3 doses  flagyl 500 mg IV q 8 hrs x 3 doses    #FEN/ENDO/HEME:  CBC with diff and CMP in AM  leukocytosis likely reactive trending down, h/h stable  cont ivf  replete lytes prn  Lovenox sc for prophylaxis    Hypophosphatemia,  resolved.     Abdominal distension, abdo x ray showed no obstruction,   d/w his son in details and answered all his concerns and questions.

## 2018-02-12 NOTE — PROVIDER CONTACT NOTE (OTHER) - SITUATION
Pt. allowed nurse to reinsert HL.
HL infiltrated.  Several attempts made by staff to reinsert HL without success.  Pt. refusing to have another person try iv insertion.

## 2018-02-12 NOTE — PROVIDER CONTACT NOTE (OTHER) - ASSESSMENT
HL #22 to left arm.  Site with no redness, swelling, pain.
Pt. with no complaints of nausea or pain.

## 2018-02-12 NOTE — PROGRESS NOTE ADULT - SUBJECTIVE AND OBJECTIVE BOX
Interval Events: States that he is having abd pain, denies nausea. Not passing gas/having bms.     MEDICATIONS  (STANDING):  alvimopan 12 milliGRAM(s) Oral two times a day  aspirin  chewable 81 milliGRAM(s) Oral daily  atorvastatin 40 milliGRAM(s) Oral at bedtime  cholecalciferol 1000 Unit(s) Oral daily  clopidogrel Tablet 75 milliGRAM(s) Oral daily  dextrose 5% + lactated ringers. 1000 milliLiter(s) (50 mL/Hr) IV Continuous <Continuous>  enoxaparin Injectable 40 milliGRAM(s) SubCutaneous daily  gabapentin 100 milliGRAM(s) Oral two times a day  influenza   Vaccine 0.5 milliLiter(s) IntraMuscular once  levETIRAcetam 500 milliGRAM(s) Oral every 8 hours  metoprolol     tartrate 50 milliGRAM(s) Oral every 12 hours  pantoprazole    Tablet 40 milliGRAM(s) Oral before breakfast    MEDICATIONS  (PRN):  HYDROmorphone  Injectable 0.5 milliGRAM(s) IV Push every 4 hours PRN Severe Pain (7 - 10)  morphine  - Injectable 4 milliGRAM(s) IV Push every 4 hours PRN Moderate Pain (4 - 6)  morphine  - Injectable 1 milliGRAM(s) IV Push every 4 hours PRN Mild Pain (1 - 3)      Allergies    No Known Allergies    Intolerances        Review of Systems:    General:  No wt loss, fevers, chills, night sweats,fatigue,   Eyes:  Good vision, no reported pain  ENT:  No sore throat, pain, runny nose, dysphagia  CV:  No pain, palpitations, hypo/hypertension  Resp:  No dyspnea, cough, tachypnea, wheezing  GI:  + pain, No nausea, No vomiting, No diarrhea, No constipation, No weight loss, No fever, No pruritis, No rectal bleeding, No melena, No dysphagia  :  No pain, bleeding, incontinence, nocturia  Muscle:  No pain, weakness  Neuro:  No weakness, tingling, memory problems  Psych:  No fatigue, insomnia, mood problems, depression  Endocrine:  No polyuria, polydypsia, cold/heat intolerance  Heme:  No petechiae, ecchymosis, easy bruisability  Skin:  No rash, tattoos, scars, edema      Vital Signs Last 24 Hrs  T(C): 36.9 (12 Feb 2018 07:27), Max: 37.3 (11 Feb 2018 16:08)  T(F): 98.5 (12 Feb 2018 07:27), Max: 99.2 (11 Feb 2018 23:15)  HR: 82 (12 Feb 2018 07:27) (82 - 91)  BP: 152/85 (12 Feb 2018 07:27) (144/86 - 161/80)  BP(mean): --  RR: 18 (12 Feb 2018 07:27) (16 - 18)  SpO2: 93% (12 Feb 2018 07:27) (92% - 94%)    PHYSICAL EXAM:    Constitutional: NAD, well-developed  HEENT: EOMI, throat clear  Neck: No LAD, supple  Respiratory: CTA and P  Cardiovascular: S1 and S2, RRR, no M  Gastrointestinal: BS+, soft, NT/ND, neg HSM,  Extremities: No peripheral edema, neg clubing, cyanosis  Vascular: 2+ peripheral pulses  Neurological: A/O x 3, no focal deficits  Psychiatric: Normal mood, normal affect  Skin: No rashes      LABS:                        14.5   24.0  )-----------( 160      ( 12 Feb 2018 05:55 )             42.1     02-12    139  |  104  |  10  ----------------------------<  116<H>  3.6   |  23  |  0.63    Ca    8.8      12 Feb 2018 05:55  Phos  3.1     02-12  Mg     1.9     02-11    TPro  6.6  /  Alb  3.0<L>  /  TBili  1.0  /  DBili  x   /  AST  23  /  ALT  22  /  AlkPhos  82  02-11          RADIOLOGY & ADDITIONAL TESTS:

## 2018-02-12 NOTE — PROGRESS NOTE ADULT - SUBJECTIVE AND OBJECTIVE BOX
pt seen  c/o abdominal pain  +nausea this morning, -vomiting  -f-bm  ICU Vital Signs Last 24 Hrs  T(C): 36.9 (12 Feb 2018 07:27), Max: 37.3 (11 Feb 2018 16:08)  T(F): 98.5 (12 Feb 2018 07:27), Max: 99.2 (11 Feb 2018 23:15)  HR: 82 (12 Feb 2018 07:27) (82 - 91)  BP: 152/85 (12 Feb 2018 07:27) (144/86 - 161/80)  BP(mean): --  ABP: --  ABP(mean): --  RR: 18 (12 Feb 2018 07:27) (16 - 18)  SpO2: 93% (12 Feb 2018 07:27) (92% - 94%)  NAD  Clear b/l  distended, soft, mild tenderness, incision c/d/i                          14.5   24.0  )-----------( 160      ( 12 Feb 2018 05:55 )             42.1   02-12    139  |  104  |  10  ----------------------------<  116<H>  3.6   |  23  |  0.63    Ca    8.8      12 Feb 2018 05:55  Phos  3.1     02-12  Mg     1.9     02-11    TPro  6.6  /  Alb  3.0<L>  /  TBili  1.0  /  DBili  x   /  AST  23  /  ALT  22  /  AlkPhos  82  02-11    AXR- yesterday shows ileus

## 2018-02-12 NOTE — PROGRESS NOTE ADULT - SUBJECTIVE AND OBJECTIVE BOX
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 Hsieh). Recent admission at [facility] 1/22 - 1/26 admitted for G.I. bleed in which CT abd demonstrated extensive sigmoid colon diverticulitis with sigmoid submucosal mass, and obtained a colonoscopy with bx which demonstrated tubular adenoma. Pt was re admitted 2/2 for planned sigmoid resection which was performed today. Initial Consult and admission to ICU s/p Laparoscopic assisted low anterior resection with take down of splenic flexure , now on surgical floor    pt still has mild abdo pain, no N/V , low appetite     Pain Location & Control: controlled     MEDICATIONS  (STANDING):  alvimopan 12 milliGRAM(s) Oral two times a day  aspirin  chewable 81 milliGRAM(s) Oral daily  atorvastatin 40 milliGRAM(s) Oral at bedtime  cholecalciferol 1000 Unit(s) Oral daily  clopidogrel Tablet 75 milliGRAM(s) Oral daily  dextrose 5% + lactated ringers. 1000 milliLiter(s) (50 mL/Hr) IV Continuous <Continuous>  enoxaparin Injectable 40 milliGRAM(s) SubCutaneous daily  gabapentin 100 milliGRAM(s) Oral two times a day  influenza   Vaccine 0.5 milliLiter(s) IntraMuscular once  levETIRAcetam 500 milliGRAM(s) Oral every 8 hours  metoprolol     tartrate 50 milliGRAM(s) Oral every 12 hours  pantoprazole    Tablet 40 milliGRAM(s) Oral before breakfast    MEDICATIONS  (PRN):  HYDROmorphone  Injectable 0.5 milliGRAM(s) IV Push every 4 hours PRN Severe Pain (7 - 10)  morphine  - Injectable 4 milliGRAM(s) IV Push every 4 hours PRN Moderate Pain (4 - 6)  morphine  - Injectable 1 milliGRAM(s) IV Push every 4 hours PRN Mild Pain (1 - 3)      Allergies    No Known Allergies    Intolerances        REVIEW OF SYSTEMS:  CONSTITUTIONAL: No fever, weight loss, or fatigue  EYES: No eye pain, visual disturbances, or discharge  ENMT:  No difficulty hearing, tinnitus, vertigo; No sinus or throat pain  NECK: No pain or stiffness  BREASTS: No pain, masses, or nipple discharge  RESPIRATORY: No cough, wheezing, chills or hemoptysis; No shortness of breath  CARDIOVASCULAR: No chest pain, palpitations, dizziness, or leg swelling  GASTROINTESTINAL: No abdominal or epigastric pain. No nausea, vomiting, or hematemesis; No diarrhea or constipation. No melena or hematochezia.  GENITOURINARY: No dysuria, frequency, hematuria, or incontinence  NEUROLOGICAL: No headaches, memory loss, loss of strength, numbness, or tremors  SKIN: No itching, burning, rashes, or lesions   LYMPH NODES: No enlarged glands  ENDOCRINE: No heat or cold intolerance; No hair loss; No polydipsia or polyuria  MUSCULOSKELETAL: No back pain  PSYCHIATRIC: No depression, anxiety, mood swings, or difficulty sleeping  HEME/LYMPH: No easy bruising, or bleeding gums  ALLERGY AND IMMUNOLOGIC: No hives or eczema    Vital Signs Last 24 Hrs  T(C): 36.9 (12 Feb 2018 07:27), Max: 37.3 (11 Feb 2018 23:15)  T(F): 98.5 (12 Feb 2018 07:27), Max: 99.2 (11 Feb 2018 23:15)  HR: 82 (12 Feb 2018 07:27) (82 - 91)  BP: 152/85 (12 Feb 2018 07:27) (144/86 - 152/85)  BP(mean): --  RR: 18 (12 Feb 2018 07:27) (18 - 18)  SpO2: 93% (12 Feb 2018 07:27) (92% - 94%)    PHYSICAL EXAM:  GENERAL: NAD, well-groomed, well-developed  HEAD:  Atraumatic, Normocephalic  EYES: EOMI, PERRLA, conjunctiva and sclera clear  ENMT: No tonsillar erythema, exudates, or enlargement; Moist mucous membranes, Good dentition, No lesions  NECK: Supple, No JVD, Normal thyroid  NERVOUS SYSTEM:  Alert & Oriented X3, Good concentration; Motor Strength 5/5 B/L upper and lower extremities; DTRs 2+ intact and symmetric  CHEST/LUNG: Clear to auscultation bilaterally; No rales, rhonchi, wheezing, or rubs  HEART: Regular rate and rhythm; No murmurs, rubs, or gallops  ABDOMEN: Soft, tender, distended; Bowel sounds present  EXTREMITIES:  2+ Peripheral Pulses, No clubbing or cyanosis  LYMPH: No lymphadenopathy noted  SKIN: No rashes or lesions  INCISION:  Dressing dry and intact    LABS:                        14.5   24.0  )-----------( 160      ( 12 Feb 2018 05:55 )             42.1     12 Feb 2018 05:55    139    |  104    |  10     ----------------------------<  116    3.6     |  23     |  0.63     Ca    8.8        12 Feb 2018 05:55  Phos  3.1       12 Feb 2018 05:55          CAPILLARY BLOOD GLUCOSE          RADIOLOGY & ADDITIONAL TESTS:    Imaging Personally Reviewed:  [ ] YES  [ ] NO    Consultant(s) Notes Reviewed:  [x ] YES  [ ] NO    Care Discussed with Consultants/Other Providers [x ] YES  [ ] NO

## 2018-02-13 LAB
ANION GAP SERPL CALC-SCNC: 9 MMOL/L — SIGNIFICANT CHANGE UP (ref 5–17)
BUN SERPL-MCNC: 14 MG/DL — SIGNIFICANT CHANGE UP (ref 7–23)
CALCIUM SERPL-MCNC: 8.6 MG/DL — SIGNIFICANT CHANGE UP (ref 8.5–10.1)
CHLORIDE SERPL-SCNC: 109 MMOL/L — HIGH (ref 96–108)
CO2 SERPL-SCNC: 24 MMOL/L — SIGNIFICANT CHANGE UP (ref 22–31)
CREAT SERPL-MCNC: 0.69 MG/DL — SIGNIFICANT CHANGE UP (ref 0.5–1.3)
GLUCOSE SERPL-MCNC: 116 MG/DL — HIGH (ref 70–99)
HCT VFR BLD CALC: 42.1 % — SIGNIFICANT CHANGE UP (ref 39–50)
HGB BLD-MCNC: 14 G/DL — SIGNIFICANT CHANGE UP (ref 13–17)
MCHC RBC-ENTMCNC: 29.6 PG — SIGNIFICANT CHANGE UP (ref 27–34)
MCHC RBC-ENTMCNC: 33.3 GM/DL — SIGNIFICANT CHANGE UP (ref 32–36)
MCV RBC AUTO: 88.7 FL — SIGNIFICANT CHANGE UP (ref 80–100)
PLATELET # BLD AUTO: 143 K/UL — LOW (ref 150–400)
POTASSIUM SERPL-MCNC: 3.7 MMOL/L — SIGNIFICANT CHANGE UP (ref 3.5–5.3)
POTASSIUM SERPL-SCNC: 3.7 MMOL/L — SIGNIFICANT CHANGE UP (ref 3.5–5.3)
RBC # BLD: 4.74 M/UL — SIGNIFICANT CHANGE UP (ref 4.2–5.8)
RBC # FLD: 13 % — SIGNIFICANT CHANGE UP (ref 10.3–14.5)
SODIUM SERPL-SCNC: 142 MMOL/L — SIGNIFICANT CHANGE UP (ref 135–145)
WBC # BLD: 19 K/UL — HIGH (ref 3.8–10.5)
WBC # FLD AUTO: 19 K/UL — HIGH (ref 3.8–10.5)

## 2018-02-13 PROCEDURE — 99233 SBSQ HOSP IP/OBS HIGH 50: CPT

## 2018-02-13 PROCEDURE — 74018 RADEX ABDOMEN 1 VIEW: CPT | Mod: 26

## 2018-02-13 RX ORDER — DOCUSATE SODIUM 100 MG
100 CAPSULE ORAL THREE TIMES A DAY
Qty: 0 | Refills: 0 | Status: DISCONTINUED | OUTPATIENT
Start: 2018-02-13 | End: 2018-02-15

## 2018-02-13 RX ORDER — KETOROLAC TROMETHAMINE 30 MG/ML
15 SYRINGE (ML) INJECTION EVERY 6 HOURS
Qty: 0 | Refills: 0 | Status: DISCONTINUED | OUTPATIENT
Start: 2018-02-13 | End: 2018-02-15

## 2018-02-13 RX ADMIN — ATORVASTATIN CALCIUM 40 MILLIGRAM(S): 80 TABLET, FILM COATED ORAL at 21:57

## 2018-02-13 RX ADMIN — GABAPENTIN 100 MILLIGRAM(S): 400 CAPSULE ORAL at 05:16

## 2018-02-13 RX ADMIN — Medication 50 MILLIGRAM(S): at 18:59

## 2018-02-13 RX ADMIN — HYDROMORPHONE HYDROCHLORIDE 0.5 MILLIGRAM(S): 2 INJECTION INTRAMUSCULAR; INTRAVENOUS; SUBCUTANEOUS at 09:50

## 2018-02-13 RX ADMIN — Medication 15 MILLIGRAM(S): at 18:49

## 2018-02-13 RX ADMIN — GABAPENTIN 100 MILLIGRAM(S): 400 CAPSULE ORAL at 18:51

## 2018-02-13 RX ADMIN — CLOPIDOGREL BISULFATE 75 MILLIGRAM(S): 75 TABLET, FILM COATED ORAL at 13:59

## 2018-02-13 RX ADMIN — Medication 15 MILLIGRAM(S): at 13:58

## 2018-02-13 RX ADMIN — Medication 81 MILLIGRAM(S): at 14:00

## 2018-02-13 RX ADMIN — SODIUM CHLORIDE 50 MILLILITER(S): 9 INJECTION, SOLUTION INTRAVENOUS at 18:41

## 2018-02-13 RX ADMIN — Medication 15 MILLIGRAM(S): at 18:53

## 2018-02-13 RX ADMIN — Medication 100 MILLIGRAM(S): at 21:57

## 2018-02-13 RX ADMIN — HYDROMORPHONE HYDROCHLORIDE 0.5 MILLIGRAM(S): 2 INJECTION INTRAMUSCULAR; INTRAVENOUS; SUBCUTANEOUS at 10:20

## 2018-02-13 RX ADMIN — ALVIMOPAN 12 MILLIGRAM(S): 12 CAPSULE ORAL at 18:51

## 2018-02-13 RX ADMIN — LEVETIRACETAM 500 MILLIGRAM(S): 250 TABLET, FILM COATED ORAL at 05:16

## 2018-02-13 RX ADMIN — ENOXAPARIN SODIUM 40 MILLIGRAM(S): 100 INJECTION SUBCUTANEOUS at 13:58

## 2018-02-13 RX ADMIN — Medication 15 MILLIGRAM(S): at 14:08

## 2018-02-13 RX ADMIN — LEVETIRACETAM 500 MILLIGRAM(S): 250 TABLET, FILM COATED ORAL at 21:57

## 2018-02-13 RX ADMIN — Medication 1000 UNIT(S): at 14:00

## 2018-02-13 RX ADMIN — ALVIMOPAN 12 MILLIGRAM(S): 12 CAPSULE ORAL at 05:16

## 2018-02-13 RX ADMIN — Medication 15 MILLIGRAM(S): at 23:40

## 2018-02-13 RX ADMIN — Medication 50 MILLIGRAM(S): at 05:16

## 2018-02-13 RX ADMIN — LEVETIRACETAM 500 MILLIGRAM(S): 250 TABLET, FILM COATED ORAL at 13:59

## 2018-02-13 RX ADMIN — Medication 100 MILLIGRAM(S): at 13:59

## 2018-02-13 RX ADMIN — PANTOPRAZOLE SODIUM 40 MILLIGRAM(S): 20 TABLET, DELAYED RELEASE ORAL at 05:16

## 2018-02-13 NOTE — PROGRESS NOTE ADULT - SUBJECTIVE AND OBJECTIVE BOX
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 Hsieh). Recent admission at [facility] 1/22 - 1/26 admitted for G.I. bleed in which CT abd demonstrated extensive sigmoid colon diverticulitis with sigmoid submucosal mass, and obtained a colonoscopy with bx which demonstrated tubular adenoma. Pt was re admitted 2/2 for planned sigmoid resection which was performed today. Initial Consult and admission to ICU s/p Laparoscopic assisted low anterior resection with take down of splenic flexure , now on surgical floor, pt feels better ,  pain improved. no sob. no BM.     MEDICATIONS  (STANDING):  alvimopan 12 milliGRAM(s) Oral two times a day  aspirin  chewable 81 milliGRAM(s) Oral daily  atorvastatin 40 milliGRAM(s) Oral at bedtime  cholecalciferol 1000 Unit(s) Oral daily  clopidogrel Tablet 75 milliGRAM(s) Oral daily  dextrose 5% + lactated ringers. 1000 milliLiter(s) (50 mL/Hr) IV Continuous <Continuous>  docusate sodium 100 milliGRAM(s) Oral three times a day  enoxaparin Injectable 40 milliGRAM(s) SubCutaneous daily  gabapentin 100 milliGRAM(s) Oral two times a day  influenza   Vaccine 0.5 milliLiter(s) IntraMuscular once  ketorolac   Injectable 15 milliGRAM(s) IV Push every 6 hours  levETIRAcetam 500 milliGRAM(s) Oral every 8 hours  metoprolol     tartrate 50 milliGRAM(s) Oral every 12 hours  pantoprazole    Tablet 40 milliGRAM(s) Oral before breakfast    MEDICATIONS  (PRN):  HYDROmorphone  Injectable 0.5 milliGRAM(s) IV Push every 4 hours PRN Severe Pain (7 - 10)  morphine  - Injectable 4 milliGRAM(s) IV Push every 4 hours PRN Moderate Pain (4 - 6)  morphine  - Injectable 1 milliGRAM(s) IV Push every 4 hours PRN Mild Pain (1 - 3)      Allergies    No Known Allergies    Intolerances        REVIEW OF SYSTEMS:  CONSTITUTIONAL: No fever, weight loss, or fatigue  EYES: No eye pain, visual disturbances, or discharge  ENMT:  No difficulty hearing, tinnitus, vertigo; No sinus or throat pain  NECK: No pain or stiffness  BREASTS: No pain, masses, or nipple discharge  RESPIRATORY: No cough, wheezing, chills or hemoptysis; No shortness of breath  CARDIOVASCULAR: No chest pain, palpitations, dizziness, or leg swelling  GASTROINTESTINAL: No abdominal or epigastric pain. No nausea, vomiting, or hematemesis; No diarrhea or constipation. No melena or hematochezia.  GENITOURINARY: No dysuria, frequency, hematuria, or incontinence  NEUROLOGICAL: No headaches, memory loss, loss of strength, numbness, or tremors  SKIN: No itching, burning, rashes, or lesions   LYMPH NODES: No enlarged glands  ENDOCRINE: No heat or cold intolerance; No hair loss; No polydipsia or polyuria  MUSCULOSKELETAL: No joint pain or swelling; No muscle, back, or extremity pain  PSYCHIATRIC: No depression, anxiety, mood swings, or difficulty sleeping  HEME/LYMPH: No easy bruising, or bleeding gums  ALLERGY AND IMMUNOLOGIC: No hives or eczema    Vital Signs Last 24 Hrs  T(C): 36.5 (13 Feb 2018 07:20), Max: 37.1 (12 Feb 2018 17:10)  T(F): 97.7 (13 Feb 2018 07:20), Max: 98.8 (13 Feb 2018 05:15)  HR: 70 (13 Feb 2018 07:20) (70 - 94)  BP: 138/76 (13 Feb 2018 07:20) (126/68 - 138/76)  BP(mean): --  RR: 17 (13 Feb 2018 07:20) (16 - 17)  SpO2: 93% (13 Feb 2018 07:20) (93% - 95%)    PHYSICAL EXAM:  GENERAL: NAD, well-groomed, well-developed  HEAD:  Atraumatic, Normocephalic  EYES: EOMI, PERRLA, conjunctiva and sclera clear  ENMT: No tonsillar erythema, exudates, or enlargement; Moist mucous membranes, Good dentition, No lesions  NECK: Supple, No JVD, Normal thyroid  NERVOUS SYSTEM:  Alert & Oriented X3, Good concentration; Motor Strength 5/5 B/L upper and lower extremities; DTRs 2+ intact and symmetric  CHEST/LUNG: Clear to auscultation bilaterally; No rales, rhonchi, wheezing, or rubs  HEART: Regular rate and rhythm; No murmurs, rubs, or gallops  ABDOMEN: Soft, tender, Nondistended; Bowel sounds present  EXTREMITIES:  2+ Peripheral Pulses, No clubbing, cyanosis, or edema  LYMPH: No lymphadenopathy noted  SKIN: No rashes or lesions    LABS:                        14.0   19.0  )-----------( 143      ( 13 Feb 2018 08:25 )             42.1     13 Feb 2018 08:25    142    |  109    |  14     ----------------------------<  116    3.7     |  24     |  0.69     Ca    8.6        13 Feb 2018 08:25          CAPILLARY BLOOD GLUCOSE          RADIOLOGY & ADDITIONAL TESTS:    Imaging Personally Reviewed: arpit CANALES,  [x ] YES  [ ] NO    Consultant(s) Notes Reviewed:  [x ] YES  [ ] NO    Care Discussed with Consultants/Other Providers [x ] YES  [ ] NO

## 2018-02-13 NOTE — PROGRESS NOTE ADULT - ASSESSMENT
71 yo s/p lap sigmoidectomy.        -toradol for pain      -encourage ambulation      -start colace      -full liquid diet

## 2018-02-13 NOTE — PROGRESS NOTE ADULT - ASSESSMENT
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 Hsieh). Recent admission at [facility] 1/22 - 1/26 admitted for G.I. bleed in which CT abd demonstrated extensive sigmoid colon diverticulitis with sigmoid submucosal mass, and obtained a colonoscopy with bx which demonstrated tubular adenoma. Pt was re admitted 2/2 for planned sigmoid resection which was performed today. Initial Consult and admission to ICU s/p Laparoscopic assisted low anterior resection with take down of splenic flexure , now on surgical floor      Plan:  #Neuro:  resume keppra 500 mg po q 8 h   sz precautions, asp precautions  neuro checks q 4 hrs and prn for changes  pain med with morphine IV prn and Dilaudid IV prn.   ambulate as tolerated if ok with surgery    #Pulm:  supplemental O2 to maintain spo2>92%  incentive spirometry 10x q 2 hrs   HOB>/= 30 degree angle    #CV: CAD, HTN, HLD  stable  restarted plavix on 2/10/18 POD #2  assure adequate pain control  when able to take p.o. resume metoprolol 50 mg p.o. q 12 hr; plavix 75 mg po qd, ASA 81 mg po qd (will start today); atorvastatin 40 mg qhs  (TTE with EF 65%; mild diastolic dysfunction)    #G.I./: GERD  diet as per surgery currently clears  pt on protonix on home meds, cont po 40 mg qd at present  strict I & O's keep even     #FEN/ENDO/HEME:  CBC with diff and CMP in AM  leukocytosis likely reactive trending down, h/h stable  replete lytes prn  Lovenox sc for prophylaxis    Hypophosphatemia,  resolved.     Abdominal distension, abdo x ray showed no obstruction,   d/w his son in details and answered all his concerns and questions. cont laxation and ambulation.

## 2018-02-13 NOTE — PROGRESS NOTE ADULT - SUBJECTIVE AND OBJECTIVE BOX
Interval Events: + abdominal pain, no significant improvement. + flatus, denies N/V, (-)Bms      MEDICATIONS  (STANDING):  alvimopan 12 milliGRAM(s) Oral two times a day  aspirin  chewable 81 milliGRAM(s) Oral daily  atorvastatin 40 milliGRAM(s) Oral at bedtime  cholecalciferol 1000 Unit(s) Oral daily  clopidogrel Tablet 75 milliGRAM(s) Oral daily  dextrose 5% + lactated ringers. 1000 milliLiter(s) (50 mL/Hr) IV Continuous <Continuous>  docusate sodium 100 milliGRAM(s) Oral three times a day  enoxaparin Injectable 40 milliGRAM(s) SubCutaneous daily  gabapentin 100 milliGRAM(s) Oral two times a day  influenza   Vaccine 0.5 milliLiter(s) IntraMuscular once  ketorolac   Injectable 15 milliGRAM(s) IV Push every 6 hours  levETIRAcetam 500 milliGRAM(s) Oral every 8 hours  metoprolol     tartrate 50 milliGRAM(s) Oral every 12 hours  pantoprazole    Tablet 40 milliGRAM(s) Oral before breakfast    MEDICATIONS  (PRN):  HYDROmorphone  Injectable 0.5 milliGRAM(s) IV Push every 4 hours PRN Severe Pain (7 - 10)  morphine  - Injectable 4 milliGRAM(s) IV Push every 4 hours PRN Moderate Pain (4 - 6)  morphine  - Injectable 1 milliGRAM(s) IV Push every 4 hours PRN Mild Pain (1 - 3)      Allergies    No Known Allergies    Intolerances        Review of Systems:    General:  No wt loss, fevers, chills, night sweats,fatigue,   Eyes:  Good vision, no reported pain  ENT:  No sore throat, pain, runny nose, dysphagia  CV:  No pain, palpitations, hypo/hypertension  Resp:  No dyspnea, cough, tachypnea, wheezing  GI:  + pain, No nausea, No vomiting, No diarrhea, No constipation, No weight loss, No fever, No pruritis, No rectal bleeding, No melena, No dysphagia  :  No pain, bleeding, incontinence, nocturia  Muscle:  No pain, weakness  Neuro:  No weakness, tingling, memory problems  Psych:  No fatigue, insomnia, mood problems, depression  Endocrine:  No polyuria, polydypsia, cold/heat intolerance  Heme:  No petechiae, ecchymosis, easy bruisability  Skin:  No rash, tattoos, scars, edema        Vital Signs Last 24 Hrs  T(C): 36.5 (13 Feb 2018 07:20), Max: 37.1 (12 Feb 2018 17:10)  T(F): 97.7 (13 Feb 2018 07:20), Max: 98.8 (13 Feb 2018 05:15)  HR: 70 (13 Feb 2018 07:20) (70 - 94)  BP: 138/76 (13 Feb 2018 07:20) (126/68 - 138/76)  BP(mean): --  RR: 17 (13 Feb 2018 07:20) (16 - 17)  SpO2: 93% (13 Feb 2018 07:20) (93% - 95%)    PHYSICAL EXAM:    Constitutional: NAD, well-developed  HEENT: EOMI, throat clear  Neck: No LAD, supple  Respiratory: CTA and P  Cardiovascular: S1 and S2, RRR, no M  Gastrointestinal: BS+, soft, NT/ND, neg HSM,  Extremities: No peripheral edema, neg clubbing, cyanosis  Vascular: 2+ peripheral pulses  Neurological: A/O x 3, no focal deficits  Psychiatric: Normal mood, normal affect  Skin: No rashes      LABS:                        14.0   19.0  )-----------( 143      ( 13 Feb 2018 08:25 )             42.1     02-13    142  |  109<H>  |  14  ----------------------------<  116<H>  3.7   |  24  |  0.69    Ca    8.6      13 Feb 2018 08:25  Phos  3.1     02-12            RADIOLOGY & ADDITIONAL TESTS:

## 2018-02-13 NOTE — PROGRESS NOTE ADULT - SUBJECTIVE AND OBJECTIVE BOX
pt seen  no change in pain  minimal ambulation  -n-v  not drinking  refusing pain meds  +Flatus yesterday and today  ICU Vital Signs Last 24 Hrs  T(C): 36.5 (13 Feb 2018 07:20), Max: 37.1 (12 Feb 2018 17:10)  T(F): 97.7 (13 Feb 2018 07:20), Max: 98.8 (13 Feb 2018 05:15)  HR: 70 (13 Feb 2018 07:20) (70 - 94)  BP: 138/76 (13 Feb 2018 07:20) (126/68 - 138/76)  BP(mean): --  ABP: --  ABP(mean): --  RR: 17 (13 Feb 2018 07:20) (16 - 17)  SpO2: 93% (13 Feb 2018 07:20) (93% - 95%)  NAD  clear b/l  mild distension, soft, tender, incision c/d/i    KUB- nonspecific bowel patterns                          14.0   19.0  )-----------( 143      ( 13 Feb 2018 08:25 )             42.1   02-13    142  |  109<H>  |  14  ----------------------------<  116<H>  3.7   |  24  |  0.69    Ca    8.6      13 Feb 2018 08:25  Phos  3.1     02-12

## 2018-02-14 PROCEDURE — 74018 RADEX ABDOMEN 1 VIEW: CPT | Mod: 26

## 2018-02-14 PROCEDURE — 99233 SBSQ HOSP IP/OBS HIGH 50: CPT

## 2018-02-14 RX ADMIN — ATORVASTATIN CALCIUM 40 MILLIGRAM(S): 80 TABLET, FILM COATED ORAL at 21:30

## 2018-02-14 RX ADMIN — LEVETIRACETAM 500 MILLIGRAM(S): 250 TABLET, FILM COATED ORAL at 21:30

## 2018-02-14 RX ADMIN — CLOPIDOGREL BISULFATE 75 MILLIGRAM(S): 75 TABLET, FILM COATED ORAL at 13:27

## 2018-02-14 RX ADMIN — Medication 100 MILLIGRAM(S): at 06:16

## 2018-02-14 RX ADMIN — Medication 15 MILLIGRAM(S): at 00:05

## 2018-02-14 RX ADMIN — ENOXAPARIN SODIUM 40 MILLIGRAM(S): 100 INJECTION SUBCUTANEOUS at 13:27

## 2018-02-14 RX ADMIN — GABAPENTIN 100 MILLIGRAM(S): 400 CAPSULE ORAL at 17:39

## 2018-02-14 RX ADMIN — Medication 100 MILLIGRAM(S): at 13:27

## 2018-02-14 RX ADMIN — GABAPENTIN 100 MILLIGRAM(S): 400 CAPSULE ORAL at 06:16

## 2018-02-14 RX ADMIN — LEVETIRACETAM 500 MILLIGRAM(S): 250 TABLET, FILM COATED ORAL at 06:16

## 2018-02-14 RX ADMIN — Medication 50 MILLIGRAM(S): at 17:39

## 2018-02-14 RX ADMIN — LEVETIRACETAM 500 MILLIGRAM(S): 250 TABLET, FILM COATED ORAL at 13:27

## 2018-02-14 RX ADMIN — Medication 15 MILLIGRAM(S): at 06:44

## 2018-02-14 RX ADMIN — Medication 1000 UNIT(S): at 13:27

## 2018-02-14 RX ADMIN — Medication 100 MILLIGRAM(S): at 21:29

## 2018-02-14 RX ADMIN — Medication 15 MILLIGRAM(S): at 14:47

## 2018-02-14 RX ADMIN — Medication 15 MILLIGRAM(S): at 13:27

## 2018-02-14 RX ADMIN — SODIUM CHLORIDE 50 MILLILITER(S): 9 INJECTION, SOLUTION INTRAVENOUS at 08:25

## 2018-02-14 RX ADMIN — ALVIMOPAN 12 MILLIGRAM(S): 12 CAPSULE ORAL at 17:39

## 2018-02-14 RX ADMIN — Medication 15 MILLIGRAM(S): at 17:39

## 2018-02-14 RX ADMIN — Medication 50 MILLIGRAM(S): at 06:16

## 2018-02-14 RX ADMIN — Medication 81 MILLIGRAM(S): at 13:27

## 2018-02-14 RX ADMIN — Medication 15 MILLIGRAM(S): at 06:14

## 2018-02-14 RX ADMIN — PANTOPRAZOLE SODIUM 40 MILLIGRAM(S): 20 TABLET, DELAYED RELEASE ORAL at 06:16

## 2018-02-14 RX ADMIN — ALVIMOPAN 12 MILLIGRAM(S): 12 CAPSULE ORAL at 06:16

## 2018-02-14 RX ADMIN — Medication 15 MILLIGRAM(S): at 18:53

## 2018-02-14 NOTE — PROGRESS NOTE ADULT - ASSESSMENT
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 Hsieh). Recent admission at [facility] 1/22 - 1/26 admitted for G.I. bleed in which CT abd demonstrated extensive sigmoid colon diverticulitis with sigmoid submucosal mass, and obtained a colonoscopy with bx which demonstrated tubular adenoma. Pt was re admitted 2/2 for planned sigmoid resection which was performed today. Initial Consult and admission to ICU s/p Laparoscopic assisted low anterior resection with take down of splenic flexure , now on surgical floor      Plan:  #Neuro:  resume keppra 500 mg po q 8 h   sz precautions, asp precautions  neuro checks q 4 hrs and prn for changes  pain med with morphine IV prn and Dilaudid IV prn.   ambulate as tolerated if ok with surgery    #Pulm:  supplemental O2 to maintain spo2>92%  incentive spirometry 10x q 2 hrs   HOB>/= 30 degree angle    #CV: CAD, HTN, HLD  stable  restarted plavix on 2/10/18 POD #2  assure adequate pain control  when able to take p.o. resume metoprolol 50 mg p.o. q 12 hr; plavix 75 mg po qd, ASA 81 mg po qd (will start today); atorvastatin 40 mg qhs  (TTE with EF 65%; mild diastolic dysfunction)    #G.I./: GERD  diet as per surgery currently clears  pt on protonix on home meds, cont po 40 mg qd at present  strict I & O's keep even     #FEN/ENDO/HEME:  CBC with diff and CMP in AM  leukocytosis likely reactive trending down, h/h stable  replete lytes prn  Lovenox sc for prophylaxis    Hypophosphatemia,  resolved.     Abdominal distension, abdo x ray showed no obstruction, just ileus, pian controlled, on regular diet, no BM yet.

## 2018-02-14 NOTE — PROGRESS NOTE ADULT - SUBJECTIVE AND OBJECTIVE BOX
pt seen  feeling good  only abdominal pain on ambulating  -n-v  +F-bm  ICU Vital Signs Last 24 Hrs  T(C): 36.8 (14 Feb 2018 07:39), Max: 36.8 (13 Feb 2018 15:59)  T(F): 98.3 (14 Feb 2018 07:39), Max: 98.3 (13 Feb 2018 15:59)  HR: 75 (14 Feb 2018 07:39) (69 - 85)  BP: 147/71 (14 Feb 2018 07:39) (115/68 - 147/71)  BP(mean): --  ABP: --  ABP(mean): --  RR: 17 (14 Feb 2018 07:39) (16 - 17)  SpO2: 95% (14 Feb 2018 07:39) (93% - 95%)  NAD  clear b/l  soft ND/ appropriate tenderness  incision c/d/i

## 2018-02-14 NOTE — PROGRESS NOTE ADULT - SUBJECTIVE AND OBJECTIVE BOX
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 Hsieh). Recent admission at [facility] 1/22 - 1/26 admitted for G.I. bleed in which CT abd demonstrated extensive sigmoid colon diverticulitis with sigmoid submucosal mass, and obtained a colonoscopy with bx which demonstrated tubular adenoma. Pt was re admitted 2/2 for planned sigmoid resection which was performed today. Initial Consult and admission to ICU s/p Laparoscopic assisted low anterior resection with take down of splenic flexure , now on surgical floor . his pain better, no BM, on regular diet.     Pain Location & Control: controlled     MEDICATIONS  (STANDING):  alvimopan 12 milliGRAM(s) Oral two times a day  aspirin  chewable 81 milliGRAM(s) Oral daily  atorvastatin 40 milliGRAM(s) Oral at bedtime  cholecalciferol 1000 Unit(s) Oral daily  clopidogrel Tablet 75 milliGRAM(s) Oral daily  docusate sodium 100 milliGRAM(s) Oral three times a day  enoxaparin Injectable 40 milliGRAM(s) SubCutaneous daily  gabapentin 100 milliGRAM(s) Oral two times a day  influenza   Vaccine 0.5 milliLiter(s) IntraMuscular once  ketorolac   Injectable 15 milliGRAM(s) IV Push every 6 hours  levETIRAcetam 500 milliGRAM(s) Oral every 8 hours  metoprolol     tartrate 50 milliGRAM(s) Oral every 12 hours  pantoprazole    Tablet 40 milliGRAM(s) Oral before breakfast    MEDICATIONS  (PRN):  HYDROmorphone  Injectable 0.5 milliGRAM(s) IV Push every 4 hours PRN Severe Pain (7 - 10)  morphine  - Injectable 4 milliGRAM(s) IV Push every 4 hours PRN Moderate Pain (4 - 6)  morphine  - Injectable 1 milliGRAM(s) IV Push every 4 hours PRN Mild Pain (1 - 3)      Allergies    No Known Allergies    Intolerances        REVIEW OF SYSTEMS:  CONSTITUTIONAL: No fever, weight loss, or fatigue  EYES: No eye pain, visual disturbances, or discharge  ENMT:  No difficulty hearing, tinnitus, vertigo; No sinus or throat pain  NECK: No pain or stiffness  BREASTS: No pain, masses, or nipple discharge  RESPIRATORY: No cough, wheezing, chills or hemoptysis; No shortness of breath  CARDIOVASCULAR: No chest pain, palpitations, dizziness, or leg swelling  GASTROINTESTINAL: No abdominal or epigastric pain. No nausea, vomiting, or hematemesis; No diarrhea or constipation. No melena or hematochezia.  GENITOURINARY: No dysuria, frequency, hematuria, or incontinence  NEUROLOGICAL: No headaches, memory loss, loss of strength, numbness, or tremors  SKIN: No itching, burning, rashes, or lesions   LYMPH NODES: No enlarged glands  ENDOCRINE: No heat or cold intolerance; No hair loss; No polydipsia or polyuria  MUSCULOSKELETAL: No back pain  PSYCHIATRIC: No depression, anxiety, mood swings, or difficulty sleeping  HEME/LYMPH: No easy bruising, or bleeding gums  ALLERGY AND IMMUNOLOGIC: No hives or eczema    Vital Signs Last 24 Hrs  T(C): 36.8 (14 Feb 2018 07:39), Max: 36.8 (13 Feb 2018 15:59)  T(F): 98.3 (14 Feb 2018 07:39), Max: 98.3 (13 Feb 2018 15:59)  HR: 75 (14 Feb 2018 07:39) (69 - 85)  BP: 147/71 (14 Feb 2018 07:39) (115/68 - 147/71)  BP(mean): --  RR: 17 (14 Feb 2018 07:39) (16 - 17)  SpO2: 95% (14 Feb 2018 07:39) (93% - 95%)    PHYSICAL EXAM:  GENERAL: NAD, well-groomed, well-developed  HEAD:  Atraumatic, Normocephalic  EYES: EOMI, PERRLA, conjunctiva and sclera clear  ENMT: No tonsillar erythema, exudates, or enlargement; Moist mucous membranes, Good dentition, No lesions  NECK: Supple, No JVD, Normal thyroid  NERVOUS SYSTEM:  Alert & Oriented X3, Good concentration; Motor Strength 5/5 B/L upper and lower extremities; DTRs 2+ intact and symmetric  CHEST/LUNG: Clear to auscultation bilaterally; No rales, rhonchi, wheezing, or rubs  HEART: Regular rate and rhythm; No murmurs, rubs, or gallops  ABDOMEN: Soft, Nontender, Nondistended; Bowel sounds present  EXTREMITIES:  2+ Peripheral Pulses, No clubbing or cyanosis  LYMPH: No lymphadenopathy noted  SKIN: No rashes or lesions  INCISION:  Dressing dry and intact    LABS:      Ca    8.6        13 Feb 2018 08:25          CAPILLARY BLOOD GLUCOSE          RADIOLOGY & ADDITIONAL TESTS:    Imaging Personally Reviewed: KUB , ileus [x ] YES  [ ] NO    Consultant(s) Notes Reviewed:  [ x] YES  [ ] NO    Care Discussed with Consultants/Other Providers [ x] YES  [ ] NO

## 2018-02-15 ENCOUNTER — TRANSCRIPTION ENCOUNTER (OUTPATIENT)
Age: 73
End: 2018-02-15

## 2018-02-15 VITALS
DIASTOLIC BLOOD PRESSURE: 69 MMHG | RESPIRATION RATE: 16 BRPM | SYSTOLIC BLOOD PRESSURE: 137 MMHG | OXYGEN SATURATION: 95 % | HEART RATE: 80 BPM | TEMPERATURE: 98 F

## 2018-02-15 PROCEDURE — 97530 THERAPEUTIC ACTIVITIES: CPT

## 2018-02-15 PROCEDURE — 97162 PT EVAL MOD COMPLEX 30 MIN: CPT

## 2018-02-15 PROCEDURE — 84100 ASSAY OF PHOSPHORUS: CPT

## 2018-02-15 PROCEDURE — 83735 ASSAY OF MAGNESIUM: CPT

## 2018-02-15 PROCEDURE — 85610 PROTHROMBIN TIME: CPT

## 2018-02-15 PROCEDURE — 94640 AIRWAY INHALATION TREATMENT: CPT

## 2018-02-15 PROCEDURE — 97116 GAIT TRAINING THERAPY: CPT

## 2018-02-15 PROCEDURE — 88309 TISSUE EXAM BY PATHOLOGIST: CPT

## 2018-02-15 PROCEDURE — C1889: CPT

## 2018-02-15 PROCEDURE — 88304 TISSUE EXAM BY PATHOLOGIST: CPT

## 2018-02-15 PROCEDURE — 80048 BASIC METABOLIC PNL TOTAL CA: CPT

## 2018-02-15 PROCEDURE — 85730 THROMBOPLASTIN TIME PARTIAL: CPT

## 2018-02-15 PROCEDURE — 99233 SBSQ HOSP IP/OBS HIGH 50: CPT

## 2018-02-15 PROCEDURE — 74018 RADEX ABDOMEN 1 VIEW: CPT

## 2018-02-15 PROCEDURE — 74019 RADEX ABDOMEN 2 VIEWS: CPT

## 2018-02-15 PROCEDURE — 80053 COMPREHEN METABOLIC PANEL: CPT

## 2018-02-15 PROCEDURE — 97112 NEUROMUSCULAR REEDUCATION: CPT

## 2018-02-15 PROCEDURE — 71045 X-RAY EXAM CHEST 1 VIEW: CPT

## 2018-02-15 PROCEDURE — 82962 GLUCOSE BLOOD TEST: CPT

## 2018-02-15 PROCEDURE — 85027 COMPLETE CBC AUTOMATED: CPT

## 2018-02-15 RX ORDER — GABAPENTIN 400 MG/1
1 CAPSULE ORAL
Qty: 0 | Refills: 0 | COMMUNITY
Start: 2018-02-15

## 2018-02-15 RX ORDER — POLYETHYLENE GLYCOL 3350 17 G/17G
17 POWDER, FOR SOLUTION ORAL DAILY
Qty: 0 | Refills: 0 | Status: DISCONTINUED | OUTPATIENT
Start: 2018-02-15 | End: 2018-02-15

## 2018-02-15 RX ORDER — TRAMADOL HYDROCHLORIDE 50 MG/1
1 TABLET ORAL
Qty: 0 | Refills: 0 | COMMUNITY

## 2018-02-15 RX ORDER — POLYETHYLENE GLYCOL 3350 17 G/17G
17 POWDER, FOR SOLUTION ORAL
Qty: 170 | Refills: 0 | OUTPATIENT
Start: 2018-02-15 | End: 2018-02-24

## 2018-02-15 RX ORDER — ATORVASTATIN CALCIUM 80 MG/1
1 TABLET, FILM COATED ORAL
Qty: 0 | Refills: 0 | COMMUNITY

## 2018-02-15 RX ORDER — DOCUSATE SODIUM 100 MG
1 CAPSULE ORAL
Qty: 0 | Refills: 0 | COMMUNITY
Start: 2018-02-15

## 2018-02-15 RX ORDER — GABAPENTIN 400 MG/1
1 CAPSULE ORAL
Qty: 0 | Refills: 0 | COMMUNITY

## 2018-02-15 RX ORDER — ATORVASTATIN CALCIUM 80 MG/1
1 TABLET, FILM COATED ORAL
Qty: 0 | Refills: 0 | COMMUNITY
Start: 2018-02-15

## 2018-02-15 RX ORDER — LEVETIRACETAM 250 MG/1
1 TABLET, FILM COATED ORAL
Qty: 0 | Refills: 0 | COMMUNITY
Start: 2018-02-15

## 2018-02-15 RX ORDER — LEVETIRACETAM 250 MG/1
1 TABLET, FILM COATED ORAL
Qty: 0 | Refills: 0 | COMMUNITY

## 2018-02-15 RX ADMIN — GABAPENTIN 100 MILLIGRAM(S): 400 CAPSULE ORAL at 16:45

## 2018-02-15 RX ADMIN — POLYETHYLENE GLYCOL 3350 17 GRAM(S): 17 POWDER, FOR SOLUTION ORAL at 12:33

## 2018-02-15 RX ADMIN — Medication 100 MILLIGRAM(S): at 06:12

## 2018-02-15 RX ADMIN — GABAPENTIN 100 MILLIGRAM(S): 400 CAPSULE ORAL at 06:12

## 2018-02-15 RX ADMIN — Medication 15 MILLIGRAM(S): at 06:12

## 2018-02-15 RX ADMIN — ENOXAPARIN SODIUM 40 MILLIGRAM(S): 100 INJECTION SUBCUTANEOUS at 12:33

## 2018-02-15 RX ADMIN — PANTOPRAZOLE SODIUM 40 MILLIGRAM(S): 20 TABLET, DELAYED RELEASE ORAL at 06:12

## 2018-02-15 RX ADMIN — Medication 15 MILLIGRAM(S): at 16:45

## 2018-02-15 RX ADMIN — Medication 15 MILLIGRAM(S): at 00:30

## 2018-02-15 RX ADMIN — LEVETIRACETAM 500 MILLIGRAM(S): 250 TABLET, FILM COATED ORAL at 06:12

## 2018-02-15 RX ADMIN — Medication 15 MILLIGRAM(S): at 13:39

## 2018-02-15 RX ADMIN — CLOPIDOGREL BISULFATE 75 MILLIGRAM(S): 75 TABLET, FILM COATED ORAL at 12:34

## 2018-02-15 RX ADMIN — ALVIMOPAN 12 MILLIGRAM(S): 12 CAPSULE ORAL at 06:12

## 2018-02-15 RX ADMIN — Medication 81 MILLIGRAM(S): at 12:34

## 2018-02-15 RX ADMIN — Medication 1000 UNIT(S): at 12:34

## 2018-02-15 RX ADMIN — Medication 100 MILLIGRAM(S): at 13:40

## 2018-02-15 RX ADMIN — Medication 50 MILLIGRAM(S): at 06:12

## 2018-02-15 RX ADMIN — LEVETIRACETAM 500 MILLIGRAM(S): 250 TABLET, FILM COATED ORAL at 13:40

## 2018-02-15 RX ADMIN — Medication 15 MILLIGRAM(S): at 06:28

## 2018-02-15 RX ADMIN — Medication 15 MILLIGRAM(S): at 12:34

## 2018-02-15 NOTE — PROGRESS NOTE ADULT - PROVIDER SPECIALTY LIST ADULT
Anesthesia
Critical Care
Gastroenterology
Hospitalist
Surgery
Gastroenterology
Hospitalist

## 2018-02-15 NOTE — DISCHARGE NOTE ADULT - MEDICATION SUMMARY - MEDICATIONS TO TAKE
I will START or STAY ON the medications listed below when I get home from the hospital:    traMADol 50 mg oral tablet  -- 1 tab(s) by mouth every 6 hours, As Needed  for pain   -- Indication: For pain     aspirin 81 mg oral tablet  -- 1 tab(s) by mouth once a day  -- Indication: For home meds     gabapentin 100 mg oral capsule  -- 1 cap(s) by mouth 2 times a day  -- Indication: For pain     levETIRAcetam 500 mg oral tablet  -- 1 tab(s) by mouth every 8 hours  -- Indication: For home meds     atorvastatin 40 mg oral tablet  -- 1 tab(s) by mouth once a day (at bedtime)  -- Indication: For CAD     Plavix 75 mg oral tablet  -- 1 tab(s) by mouth once a day  -- Indication: For Cad     metoprolol tartrate 50 mg oral tablet  -- 1 tab(s) by mouth 2 times a day  -- Indication: For Cad    Sarna 0.5%-0.5% topical lotion  -- Apply on skin to affected area 3 times a day, As Needed dry skin  -- Indication: For Constipation     docusate sodium 100 mg oral capsule  -- 1 cap(s) by mouth 3 times a day  -- Indication: For Constipation     MiraLax oral powder for reconstitution  -- 17 gram(s) by mouth once a day, As Needed -for constipation   -- Dilute this medication with liquid before administration.  It is very important that you take or use this exactly as directed.  Do not skip doses or discontinue unless directed by your doctor.    -- Indication: For Constipation     pantoprazole 40 mg oral delayed release tablet  -- 1 tab(s) by mouth once a day (before a meal)  -- Indication: For home med s    Vitamin B12 1000 mcg oral tablet  -- 1 tab(s) by mouth once a day  -- Indication: For home meds     Vitamin D3 1000 intl units oral capsule  -- 1 cap(s) by mouth once a day  -- Indication: For home meds s

## 2018-02-15 NOTE — DISCHARGE NOTE ADULT - MEDICATION SUMMARY - MEDICATIONS TO CHANGE
I will SWITCH the dose or number of times a day I take the medications listed below when I get home from the hospital:    traMADol 50 mg oral tablet  -- 1 tab(s) by mouth every 6 hours, As Needed  for pain

## 2018-02-15 NOTE — PROGRESS NOTE ADULT - ATTENDING COMMENTS
I notified Dr Mcdowell about my plan for restartnig plavix.
72M PMH CVA in 2010 with residual L hemiparesis, HTN, HLD, CAD s/p PCI (2010), Seizures on keppra, 2.7 cm AAA, and h/o CML/CLL. He was recently admitted in January with GI bleed, found to have extensive sigmoid colon diverticulosis, s/p colonoscopy s/p biopsy found to have tubular adenoma. He was readmitted 2/8 for elective laparoscopic-assisted sigmoid colectomy with take down of splenic flexure and primary reanastamosis POD#1. Patient had no complications intra-op, minimal blood loss, hemodynamically stable.    - Continue Keppra for seizures; continue gabapentin  - Pain control  - HD stable, continue aspirin, hold plavix, continue metoprolol and atorvastatin  - Stable pulmonary status, NC prn, incentive spirometry  - Keep NPO, f/up surgery re: d/c NG tube  - F/up surgery re: advancing diet  - Stable kidney function, replete low K/Mg/Phos  - Observe off abx  - DVT ppx with lovenox sq  - Discussed with patient at bedside, full code  - Stable for surgical floors
Advanced care planning was discussed with patient and family.  Advanced care planning forms were reviewed and discussed.  Risks, benefits and alternatives of gastroenterologic procedures were discussed in detail and all questions were answered.    25 minutes spent.

## 2018-02-15 NOTE — DISCHARGE NOTE ADULT - NS AS ACTIVITY OBS
No Heavy lifting/straining/Showering allowed/Walking-Indoors allowed/Do not drive or operate machinery

## 2018-02-15 NOTE — DISCHARGE NOTE ADULT - PATIENT PORTAL LINK FT
You can access the [facility] Patient Portal, offered by [facility], by registering with the following website: [email or web address]

## 2018-02-15 NOTE — PROGRESS NOTE ADULT - PROBLEM SELECTOR PLAN 1
s/p resection POD # 2   fu path  wound care  diet per surgery  prn pain control
s/p resection   fu path  wound care  diet per surgery  prn pain control
s/p resection  fu path  wound care  diet per surgery
s/p resection POD # 3  fu path  wound care  diet per surgery  prn pain control

## 2018-02-15 NOTE — PROGRESS NOTE ADULT - NSHPATTENDINGPLANDISCUSS_GEN_ALL_CORE
pt and  surgical team about post op management.
pt and Dr Mcdowell about his abdominal distension,
pt and Dr isaac about CAD and needs for plavix.
pt and family and surgical team about his abdo pain and distension.
pt and surgical team about his plan of care.
Dr Ambrose
team

## 2018-02-15 NOTE — DISCHARGE NOTE ADULT - CARE PLAN
Principal Discharge DX:	Malignant neoplasm of sigmoid colon  Goal:	s/p sigmoidescopy , monitor BM and fu with Dr Ambrose in 1 week  Assessment and plan of treatment:	as above  Secondary Diagnosis:	Ileus following gastrointestinal surgery  Goal:	resolved, had BM, cont monitore BM , cont laxative.  Secondary Diagnosis:	CAD (coronary artery disease)  Goal:	stable ,cont current meds  Secondary Diagnosis:	Seizure syndrome  Goal:	stable, cont current meds

## 2018-02-15 NOTE — PROGRESS NOTE ADULT - PROBLEM SELECTOR PROBLEM 1
Malignant neoplasm of sigmoid colon

## 2018-02-15 NOTE — PROGRESS NOTE ADULT - SUBJECTIVE AND OBJECTIVE BOX
Interval Events: Abdominal pain is improving, + flatus, denies N/V, (-)Bms. Tolerating regular diet.     MEDICATIONS  (STANDING):  alvimopan 12 milliGRAM(s) Oral two times a day  aspirin  chewable 81 milliGRAM(s) Oral daily  atorvastatin 40 milliGRAM(s) Oral at bedtime  cholecalciferol 1000 Unit(s) Oral daily  clopidogrel Tablet 75 milliGRAM(s) Oral daily  docusate sodium 100 milliGRAM(s) Oral three times a day  enoxaparin Injectable 40 milliGRAM(s) SubCutaneous daily  gabapentin 100 milliGRAM(s) Oral two times a day  influenza   Vaccine 0.5 milliLiter(s) IntraMuscular once  ketorolac   Injectable 15 milliGRAM(s) IV Push every 6 hours  levETIRAcetam 500 milliGRAM(s) Oral every 8 hours  metoprolol     tartrate 50 milliGRAM(s) Oral every 12 hours  pantoprazole    Tablet 40 milliGRAM(s) Oral before breakfast  polyethylene glycol 3350 17 Gram(s) Oral daily    MEDICATIONS  (PRN):  HYDROmorphone  Injectable 0.5 milliGRAM(s) IV Push every 4 hours PRN Severe Pain (7 - 10)  morphine  - Injectable 4 milliGRAM(s) IV Push every 4 hours PRN Moderate Pain (4 - 6)  morphine  - Injectable 1 milliGRAM(s) IV Push every 4 hours PRN Mild Pain (1 - 3)      Allergies    No Known Allergies    Intolerances        Review of Systems:    General:  No wt loss, fevers, chills, night sweats,fatigue,   Eyes:  Good vision, no reported pain  ENT:  No sore throat, pain, runny nose, dysphagia  CV:  No pain, palpitations, hypo/hypertension  Resp:  No dyspnea, cough, tachypnea, wheezing  GI:  No pain, No nausea, No vomiting, No diarrhea, No constipation, No weight loss, No fever, No pruritis, No rectal bleeding, No melena, No dysphagia  :  No pain, bleeding, incontinence, nocturia  Muscle:  No pain, weakness  Neuro:  No weakness, tingling, memory problems  Psych:  No fatigue, insomnia, mood problems, depression  Endocrine:  No polyuria, polydypsia, cold/heat intolerance  Heme:  No petechiae, ecchymosis, easy bruisability  Skin:  No rash, tattoos, scars, edema      Vital Signs Last 24 Hrs  T(C): 36.6 (15 Feb 2018 15:42), Max: 36.8 (15 Feb 2018 00:30)  T(F): 97.9 (15 Feb 2018 15:42), Max: 98.3 (15 Feb 2018 07:00)  HR: 80 (15 Feb 2018 15:42) (66 - 80)  BP: 137/69 (15 Feb 2018 15:42) (128/63 - 149/91)  BP(mean): --  RR: 16 (15 Feb 2018 15:42) (16 - 17)  SpO2: 95% (15 Feb 2018 15:42) (93% - 97%)    PHYSICAL EXAM:    Constitutional: NAD, well-developed  HEENT: EOMI, throat clear  Neck: No LAD, supple  Respiratory: CTA and P  Cardiovascular: S1 and S2, RRR, no M  Gastrointestinal: BS+, soft, NT/ND, neg HSM,  Extremities: No peripheral edema, neg clubing, cyanosis  Vascular: 2+ peripheral pulses  Neurological: A/O x 3, no focal deficits  Psychiatric: Normal mood, normal affect  Skin: No rashes      LABS:                RADIOLOGY & ADDITIONAL TESTS:

## 2018-02-15 NOTE — DISCHARGE NOTE ADULT - PLAN OF CARE
s/p sigmoidescopy , monitor BM and fu with Dr Ambrose in 1 week as above resolved, had BM, cont monitore BM , cont laxative. stable ,cont current meds stable, cont current meds

## 2018-02-15 NOTE — PROGRESS NOTE ADULT - SUBJECTIVE AND OBJECTIVE BOX
pt seen  feeling good  tolerating regular diet  +flatus  -bm  ICU Vital Signs Last 24 Hrs  T(C): 36.8 (15 Feb 2018 07:00), Max: 36.8 (15 Feb 2018 00:30)  T(F): 98.3 (15 Feb 2018 07:00), Max: 98.3 (15 Feb 2018 07:00)  HR: 66 (15 Feb 2018 07:00) (66 - 73)  BP: 149/91 (15 Feb 2018 07:00) (128/63 - 149/91)  BP(mean): --  ABP: --  ABP(mean): --  RR: 17 (15 Feb 2018 07:00) (17 - 17)  SpO2: 97% (15 Feb 2018 07:00) (93% - 97%)  NAD  Clear b/l  RRR  soft ND, appropriate tenderness  incision c/d/i

## 2018-02-15 NOTE — DISCHARGE NOTE ADULT - HOSPITAL COURSE
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 Hsieh). Recent admission at [facility] 1/22 - 1/26 admitted for G.I. bleed in which CT abd demonstrated extensive sigmoid colon diverticulitis with sigmoid submucosal mass, and obtained a colonoscopy with bx which demonstrated tubular adenoma. Pt was re admitted 2/2 for planned sigmoid resection which was performed today. Initial Consult and admission to ICU s/p Laparoscopic assisted low anterior resection with take down of splenic flexure , now on surgical floor . his pain better,  + BM, on regular diet.     #Neuro:  resume keppra 500 mg po q 8 h   sz precautions, asp precautions  pain med with tramadol, avoid narcotic       #CV: CAD, HTN, HLD  stable  restarted plavix on 2/10/18   assure adequate pain control  when able to take p.o. resume metoprolol 50 mg p.o. q 12 hr; plavix 75 mg po qd, ASA 81 mg po qd (will start today); atorvastatin 40 mg qhs  (TTE with EF 65%; mild diastolic dysfunction)    #G.I./: GERD  pt on protonix on home meds, cont po 40 mg qd at present    #FEN/ENDO/HEME:  CBC with diff and CMP in AM  leukocytosis likely reactive trending down, h/h stable  replete lytes prn      Hypophosphatemia,  resolved.     Abdominal distension, abdo x ray showed no obstruction, just ileus, pian controlled, on regular diet,  ++ BM yet. pt is stable for discharge     I spent 40 min for discharge and spoke to Dr Ambrose for surgical instruction.     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 Hsieh). Recent admission at [facility] 1/22 - 1/26 admitted for G.I. bleed in which CT abd demonstrated extensive sigmoid colon diverticulitis with sigmoid submucosal mass, and obtained a colonoscopy with bx which demonstrated tubular adenoma. Pt was re admitted 2/8 for planned lap sigmoidectomy. Initial Consult and admission to ICU s/p Laparoscopic assisted low anterior resection with take down of splenic flexure , now on surgical floor . his pain better,  + BM, on regular diet.     #Neuro:  resume keppra 500 mg po q 8 h   sz precautions, asp precautions  pain med with tramadol, avoid narcotic       #CV: CAD, HTN, HLD  stable  restarted plavix on 2/10/18   assure adequate pain control  when able to take p.o. resume metoprolol 50 mg p.o. q 12 hr; plavix 75 mg po qd, ASA 81 mg po qd (will start today); atorvastatin 40 mg qhs  (TTE with EF 65%; mild diastolic dysfunction)    #G.I./: GERD  pt on protonix on home meds, cont po 40 mg qd at present    #FEN/ENDO/HEME:  CBC with diff and CMP in AM  leukocytosis likely reactive trending down, h/h stable  replete lytes prn      Hypophosphatemia,  resolved.     Abdominal distension, abdo x ray showed no obstruction, just ileus, pian controlled, on regular diet,  ++ BM yet. pt is stable for discharge     I spent 40 min for discharge and spoke to Dr Ambrose for surgical instruction.     PHYSICAL EXAM    Constitutional: NAD, well-groomed, well-developed  HEENT: PERRLA, EOMI, Normal Hearing, MMM  Neck: No LAD, No JVD  Back: Normal spine flexure, No CVA tenderness  Respiratory: CTAB/L   Cardiovascular: S1 and S2, RRR, no M/G/R  Gastrointestinal: BS+, soft, NT/ND  Extremities: No peripheral edema  Vascular: 2+ peripheral pulses  Neurological: A/O x 3, no focal deficits  Skin: No rashes

## 2018-02-15 NOTE — DISCHARGE NOTE ADULT - CARE PROVIDER_API CALL
Lázaro Ambrose (MD), Surgery  [address] [facility]  [address] [place] [address]  Phone: [phone]  Fax: [phone]

## 2018-03-28 NOTE — DISCHARGE NOTE ADULT - NSFTFAASSIST2FT_GEN_ALL_CORE
Body Location Override (Optional - Billing Will Still Be Based On Selected Body Map Location If Applicable): vertex of scalp (4/2007) Size Of Lesion In Cm (Optional): 0 Detail Level: Simple history of stroke

## 2019-09-05 NOTE — PROVIDER CONTACT NOTE (OTHER) - ACTION/TREATMENT ORDERED:
Pain meds to be changed to p.o. and pt. may need at PICC tomorrow.  Pt. started on clear liquids today.
Problem: Adult Inpatient Plan of Care  Goal: Plan of Care Review  Outcome: Ongoing (interventions implemented as appropriate)  Pt stated feeling weak and tired .       
IV fluids continued.  No change to ivp pain meds.
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
## 2020-01-01 NOTE — ED ADULT NURSE NOTE - GENITOURINARY ASSESSMENT
[place] Discharge Instructions:    Raul Hernandez is a 2 day old  infant, delivered at Gestational Age: 39w2d.    discharged to home, accompanied by mom and dad.    If you have any questions about your baby, please call your baby's doctor    Do not give your baby any medications unless directed by your Pediatrician    Call the doctor if:  · Fever of 100 degrees F or above  · Forceful vomiting (not spitting up)  · Several feedings when infant does not suck  · Watery, runny stools (with mucous, blood or foul odor)  · Infant injury (fall from bed or table, dropped or severely shaken, or any other injuries)  · Constant crying  · Any unusual rash  · Yellow color of the skin or eyes  · Has less than 4 wet diapers in a 24 hour period of time in the first week of life, or less than 6 wet diapers in a 24 hour period after the baby is 7 days old  · No stool (bowel movement) for 48 hours  Redness, drainage or foul odor from the umbilical cord    Special instructions: In case you need to call the doctor about any of the above:    · Take baby's temperature and write it down  · Know how much and how many feedings the baby has had that day  · Note amount, color and consistency of urine and stools  · Note any changes in the infants behavior such as being very sleepy, very fussy or less active      Date and time of Delivery: 2020 12:05 PM     Delivery Method: , Low Transverse [251]        APGARS  One minute Five minutes   Skin color: 0  1    Heart rate: 2  2     Reflex: 2  2    Muscle tone: 2  2    Breathin  2    Totals:   8  9        Feeding method: Natural Human Milk and Formula   Last time baby ate: Breast;Bottle (20 0340)  Last wet diaper: 1 (20 1330)  Last stool: 1 (20 0000)    Bilirubin No results found for: BILIRUBIN   [place] Screen done: Done   Immunizations:   Most Recent Immunizations   Administered Date(s) Administered   • Hep B, adolescent or pediatric 2020   Deferred Date(s)  Deferred   • Hepatitis B Immune Globulin 2020      [place] Hearing Test Machine: Auditory Brainstem Response (Algo) (20)  [place] Hearing Test Results: Pass R;Pass L (20)    Follow up with Audiology: Not needed  Birth Weight: 6 lb 9.8 oz (3000 g)    Discharge weight: Weight: 2825 g  Discharge Date: 2020    Tummy Time:  Babies need 30- 60 minutes of SUPERVISED tummy time every day. Skin-to-skin contact is included in this!    Help after you leave the hospital:    Ideas for help at home: friends, family members, neighbors, and members of your francesco community are all there to help you.    [facility] Lactation Services (Breastfeeding help):[phone]    Reviewed with parent by: ERYN Edwards     Additional Information:   Discharge Instructions: When Your Baby Cries  The way your baby cries can tell you how the baby is feeling. It can also alert you to the baby’s needs. This sheet will help you understand what it means when your baby cries, and what you can do to help.  First try holding the baby to see if the crying stops. If it doesn’t, walking together may help soothe your baby.  Crying  It’s normal for babies to cry. Sometimes the baby just wants to be held. But if the crying doesn’t stop, look for a cause. Common causes of crying include:  · Hunger  · Discomfort (such as a wet diaper, clothes that are too tight, feeling too hot or too cold, or gas pains)  · A stuffy nose, which can make it hard for the baby to breathe  · Stress or overstimulation (especially common in preemies)  · Illness  What to Do When Your Baby Cries  Crying can be the baby’s way of telling you there’s a problem. The baby trusts you to respond to crying and fix whatever is causing the problem. Figuring out what’s wrong may take some guesswork from you. If holding the baby doesn’t help, here are some other things you can try:  · Try feeding, in case the baby is hungry. To help prevent gas pains, burp the  baby about every 5 minutes while feeding. Also keep the baby’s head higher than the rest of the body while feeding.  · Check the baby’s diaper. Change it if needed.  · Give the baby a warm bath. Or, hold a damp, warm towel on the baby’s stomach for a little while. This may calm some babies.   · Rock or walk with the baby. Motion is soothing.  · Wrap the baby snugly in a blanket. This is called swaddling. It makes the baby feel safe and secure. (See the box later on this sheet to learn how to swaddle your baby.) A baby who is old enough to roll over (about 3 months) should never be left swaddled and unattended. This could be dangerous if the baby rolls onto his or her stomach.    · Hold the baby against your bare chest. Skin-to-skin contact can be comforting to the baby.  · If the baby has a stuffy nose, use a bulb syringe to clear it. (Your baby’s doctor or nurse can show you how to do this.)  · Check for signs of illness, such as fever or diarrhea. If the baby seems sick, call the doctor.  · Fever:  ¨ In an infant under 3 months old, a rectal temperature of 100.4°F (38ºC) or higher  ¨ In a child of any age who has a temperature that rises repeatedly to 104°F (40ºC) or higher  ¨ A fever that lasts more than 24 hours in a child under 2 years old or for 3 days in a child 2 years or older.  ¨ Your child looks very ill, is unusually sleepy, or is very fussy   ¨ Your child has had a seizure  How to Swaddle Your Baby  Wrapping your baby securely in a blanket (swaddling) helps the baby feel warm and safe. Here is one method:  · Fold a square blanket diagonally to make a triangle. Turn the triangle so the flat base is at the top and the point is at the bottom.  · Lay the baby on top of the blanket with the head over the straight base of the triangle and the feet toward the point.  · Pull one side of the triangle all the way over the baby’s torso and tuck it under the baby’s body (Figure 1). A baby is most comfortable with  the arms folded over the chest. You can pull the blanket over the baby’s arms to keep them contained. Or, you can leave one arm free so the baby can suck on its fingers.  · Bring the bottom of the blanket loosely over the baby’s feet and all the way up to the neck (Figure 2). It is very important to keep the baby's feet and legs free to move. Tight swaddling is associated with a condition called hip dysplasia. If your baby has hip dysplasia, do not swaddle.   · Wrap the other side of the triangle across the baby’s chest (Figure 3).  · After your baby is swaddled, check often for the following:  ¨ The blanket stays secure. A loose blanket can cover the baby’s face and cause suffocation.  ¨ The baby is not overheated. If your baby is hot, remove the blanket and try using a lighter blanket or sheet for swaddling.        © 6004-3474 [employer]. [address] [place] [address], [place] [address]. All rights reserved. This information is not intended as a substitute for professional medical care. Always follow your healthcare professional's instructions.        Discharge Instructions: Preventing Shaken Baby Syndrome  Shaking a baby, even slightly, is very dangerous. It causes a serious problem called shaken baby syndrome. This can lead to major brain damage and death. When a baby won’t stop crying, it can be frustrating. The stress of caring for a baby, especially if your baby has been sick, puts a strain on the parents. But no matter how fed up, tired, or upset you are, you should NEVER shake your baby.       If a baby is shaken, the brain can hit the inside of the skull.   Why it’s a problem  When a baby is shaken, the brain moves back and forth inside the skull. Even a little force could cause the brain to hit the inside of the skull. This can result in bleeding and swelling inside the skull. It can lead to permanent brain damage, coma, or death.  If you’re frustrated  If you feel yourself getting fed up, here’s  how to cope:  · Put the baby down in a safe place, even if the baby is crying.  · Take a deep breath. Walk away. Count to 10. Do whatever else you need to do to calm down.  · Let others help you take care of the baby. Trade off with your partner, the baby’s grandparents, or other family members.  · Talk with your baby’s doctor about what’s causing the crying. There could be a health problem or other issue that’s making the baby cry more than normal. The doctor can also give you ideas for how to console your crying baby.  · If your baby’s doctor believes your baby is just fussy, know that this is not your fault. Your baby will grow out of this period of fussiness. It does not mean the baby does not love you, or that you are not doing a good job.  · If you’re feeling overwhelmed, talk with your baby’s doctor about  options, counseling, or other resources that can help.  · Call the [facility] Child Abuse Hotline at 027-955-7436. The trained  can help you deal with your frustration, so you don’t hurt your baby.    © 1781-8096 [employer]. [address] [place] [address], [place] [address]. All rights reserved. This information is not intended as a substitute for professional medical care. Always follow your healthcare professional's instructions.   WDL

## 2020-05-20 NOTE — PHYSICAL THERAPY INITIAL EVALUATION ADULT - LEVEL OF INDEPENDENCE: SIT/STAND, REHAB EVAL
contact guard Consent (Marginal Mandibular)/Introductory Paragraph: The rationale for Mohs was explained to the patient and consent was obtained. The risks, benefits and alternatives to therapy were discussed in detail. Specifically, the risks of damage to the marginal mandibular branch of the facial nerve, infection, scarring, bleeding, prolonged wound healing, incomplete removal, allergy to anesthesia, and recurrence were addressed. Prior to the procedure, the treatment site was clearly identified and confirmed by the patient. All components of Universal Protocol/PAUSE Rule completed.

## 2020-10-30 NOTE — ED PROVIDER NOTE - GASTROINTESTINAL NEGATIVE STATEMENT, MLM
detailed exam
no abdominal pain, no bloating, no constipation, no diarrhea, no nausea and no vomiting.

## 2020-12-06 NOTE — ED PROVIDER NOTE - PRINCIPAL DIAGNOSIS
no transient paralysis/no weakness/no generalized seizures/no paresthesias
BRBPR (bright red blood per rectum)

## 2021-11-27 NOTE — PHYSICAL THERAPY INITIAL EVALUATION ADULT - GENERAL OBSERVATIONS, REHAB EVAL
Pt to ED per ems pts family called 911 when pt fell out of bed and was unresponsive and foaming at the mouth.   Per EMS 2 doses of Narcna IM and 2 doses Narcan IV  Per EMS family states pt abuses etoh, extacy and Zanex  
Patient received semi nino in bed, no apparent distress and IV in place.

## 2022-03-26 NOTE — ED ADULT TRIAGE NOTE - AS TEMP SITE
LIZA HERNANDEZ  88y, Female  Allergy: No Known Allergies    Hospital Day: 10d    Patient seen and examined earlier today. Feeling well, no acute complaints. Discussed care with daughter at bedside.     PMH/PSH:  PAST MEDICAL & SURGICAL HISTORY:  High cholesterol    Kidney stone    Bladder polyp    Shingles  affecting right eye    Atrial flutter    H/O cystoscopy    H/O total knee replacement, right    H/O total knee replacement, left    Cancer of mouth    History of cardioversion        LAST 24-Hr EVENTS:    VITALS:  T(F): 98.4 (22 @ 13:16), Max: 99.1 (22 @ 20:04)  HR: 80 (22 @ 13:16)  BP: 122/56 (22 @ 13:16) (104/61 - 143/65)  RR: 18 (22 @ 13:16)  SpO2: 96% (22 @ 09:11)        TESTS & MEASUREMENTS:  Weight (Kg):       22 @ 07:01  -  22 @ 07:00  --------------------------------------------------------  IN: 360 mL / OUT: 2600 mL / NET: -2240 mL    22 @ 07:01  -  22 @ 07:00  --------------------------------------------------------  IN: 0 mL / OUT: 800 mL / NET: -800 mL                            9.3    9.38  )-----------( 209      ( 26 Mar 2022 04:30 )             28.7       03    130<L>  |  95<L>  |  24<H>  ----------------------------<  109<H>  4.5   |  23  |  0.7    Ca    7.8<L>      26 Mar 2022 04:30  Mg     2.0         TPro  4.7<L>  /  Alb  2.5<L>  /  TBili  1.3<H>  /  DBili  x   /  AST  30  /  ALT  14  /  AlkPhos  121<H>      LIVER FUNCTIONS - ( 26 Mar 2022 04:30 )  Alb: 2.5 g/dL / Pro: 4.7 g/dL / ALK PHOS: 121 U/L / ALT: 14 U/L / AST: 30 U/L / GGT: x                 Urinalysis Basic - ( 24 Mar 2022 20:28 )    Color: Light Yellow / Appearance: Clear / S.008 / pH: x  Gluc: x / Ketone: Negative  / Bili: Negative / Urobili: <2 mg/dL   Blood: x / Protein: Negative / Nitrite: Negative   Leuk Esterase: Moderate / RBC: 2 /HPF / WBC 4 /HPF   Sq Epi: x / Non Sq Epi: 0 /HPF / Bacteria: 0.0          Ferritin, Serum: 195 ng/mL (22 @ 04:30)          RADIOLOGY, ECG, & ADDITIONAL TESTS:      RECENT DIAGNOSTIC ORDERS:  12 Lead ECG (22 @ 19:03)      MEDICATIONS:  MEDICATIONS  (STANDING):  cholecalciferol 2000 Unit(s) Oral daily  enoxaparin Injectable 40 milliGRAM(s) SubCutaneous every 24 hours  HYDROmorphone PCA (1 mG/mL) 30 milliLiter(s) PCA Continuous PCA Continuous  pantoprazole    Tablet 40 milliGRAM(s) Oral before breakfast  prednisoLONE acetate 1% Suspension 1 Drop(s) Right EYE daily  senna 2 Tablet(s) Oral at bedtime  simvastatin 20 milliGRAM(s) Oral at bedtime    MEDICATIONS  (PRN):  acetaminophen     Tablet .. 650 milliGRAM(s) Oral every 6 hours PRN Moderate Pain (4 - 6), Severe Pain (7 - 10)  morphine  - Injectable 2 milliGRAM(s) IV Push every 4 hours PRN Moderate Pain (4 - 6)  naloxone Injectable 0.1 milliGRAM(s) IV Push every 3 minutes PRN For ANY of the following changes in patient status:  A. RR LESS THAN 10 breaths per minute, B. Oxygen saturation LESS THAN 90%, C. Sedation score of 6  ondansetron Injectable 4 milliGRAM(s) IV Push every 6 hours PRN Nausea      HOME MEDICATIONS:  Lasix 20 mg oral tablet ()  metoprolol succinate 25 mg oral tablet, extended release ()  prednisoLONE acetate 1% ophthalmic suspension ()  Vitamin D2 50 mcg (2000 intl units) oral capsule ()      PHYSICAL EXAM:  GENERAL: resting in bed comfortably  HNENT: NCAT MMM   PULMONARY: Clear to auscultation bilaterally. No rales, rhonchi, or wheezing.  CARDIOVASCULAR: Regular rate and rhythm, S1-S2, no murmurs  GASTROINTESTINAL: Soft, non-tender, non-distended, no guarding.  MSK: Warm well perfused no edema   Neuro: AAOx3, moving extremities appropriately      oral
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
## 2022-07-11 NOTE — ED PROCEDURE NOTE - DETAILS:
normal appearance, no deformities
Attending MD Pierce: Risks, benefit and alternatives of procedure explained to patient and patient demonstrated verbal understanding and consent.  I was present during the key portions of the procedure.

## 2022-10-03 NOTE — PROGRESS NOTE ADULT - SUBJECTIVE AND OBJECTIVE BOX
Patient is a 72y old  Male who presents with a chief complaint of "I was bleeding" (24 Jan 2018 09:14) Colon mass found, sigmoidectomy postponed. Patient's son and cardiologist requesting stress test before procedure.     INTERVAL HPI: Pt seen and examined. No acute events overnight. Patient upset this morning that he is still in hospital. States that he does not need assistance at home or rehab. States that he can walk by himself and get in and out of bed on his own. Denies fevers, chills, n/v, chest pain, sob, abdominal pain or blood bms.     MEDICATIONS  (STANDING):  atorvastatin 40 milliGRAM(s) Oral at bedtime  cholecalciferol 1000 Unit(s) Oral daily  cyanocobalamin 1000 MICROGram(s) Oral daily  gabapentin 100 milliGRAM(s) Oral two times a day  levETIRAcetam 500 milliGRAM(s) Oral three times a day  metoprolol     tartrate 50 milliGRAM(s) Oral two times a day  neomycin 500 milliGRAM(s) Oral three times a day  pantoprazole  Injectable 40 milliGRAM(s) IV Push daily    Vital Signs Last 24 Hrs  T(C): 36.6 (30 Jan 2018 04:51), Max: 37.2 (29 Jan 2018 13:05)  T(F): 97.9 (30 Jan 2018 04:51), Max: 98.9 (29 Jan 2018 13:05)  HR: 86 (30 Jan 2018 06:35) (60 - 86)  BP: 134/75 (30 Jan 2018 04:51) (100/57 - 134/75)  RR: 17 (30 Jan 2018 04:51) (17 - 18)  SpO2: 93% (30 Jan 2018 04:51) (92% - 93%)    PHYSICAL EXAM:  GENERAL: NAD  NERVOUS SYSTEM:  Alert & Oriented X3, Good concentration; Motor Strength 1/5 LUE and LLE, 5/5 strength in RUE, RLL  CHEST/LUNG: Clear to percussion bilaterally; No rales, rhonchi, wheezing, or rubs  HEART: S1S2+, Regular rate and rhythm; No murmurs, rubs, or gallops  ABDOMEN: Soft, nontender, Nondistended; +BS, no guarding, no rebound  EXTREMITIES:  2+ Peripheral Pulses, No clubbing, cyanosis, or edema  SKIN: No rashes or lesions    LABS:                                     14.2   26.3  )-----------( 123      ( 28 Jan 2018 06:50 )             42.0     28 Jan 2018 06:50    143    |  110    |  12     ----------------------------<  97     3.8     |  24     |  0.72     Ca    8.8        28 Jan 2018 06:50    TPro  6.3    /  Alb  3.0    /  TBili  0.5    /  DBili  x      /  AST  19     /  ALT  25     /  AlkPhos  87     28 Jan 2018 06:50    LIVER FUNCTIONS - ( 28 Jan 2018 06:50 )  Alb: 3.0 g/dL / Pro: 6.3 g/dL / ALK PHOS: 87 U/L / ALT: 25 U/L / AST: 19 U/L / GGT: x           PT/INR - ( 28 Jan 2018 06:50 )   PT: 13.8 sec;   INR: 1.26 ratio    CAPILLARY BLOOD GLUCOSE Dupixent Counseling: I discussed with the patient the risks of dupilumab including but not limited to eye infection and irritation, cold sores, injection site reactions, worsening of asthma, allergic reactions and increased risk of parasitic infection.  Live vaccines should be avoided while taking dupilumab. Dupilumab will also interact with certain medications such as warfarin and cyclosporine. The patient understands that monitoring is required and they must alert us or the primary physician if symptoms of infection or other concerning signs are noted.

## 2023-03-20 NOTE — DISCHARGE NOTE ADULT - NSFTFATTENDCERTRD_GEN_ALL_CORE
PAST SURGICAL HISTORY:  Abscess, perianal     S/P carpal tunnel release b/l    
My signature below certifies that the above stated patient is homebound and upon completion of the Face-To-Face encounter, has the need for intermittent skilled nursing, physical therapy and/or speech or occupational therapy services in their home for their current diagnosis as outlined in their initial plan of care. These services will continue to be monitored by myself or another physician.

## 2023-08-29 NOTE — PHYSICAL THERAPY INITIAL EVALUATION ADULT - DIAGNOSIS, PT EVAL
BP stable off of medication.  We will continue to monitor for now and see if he can manage to keep BP under control through lifestyle modifications   h/o CVA w/ left sided hemiparesis, unsteady gait, deconditioning

## 2024-11-18 NOTE — INPATIENT CERTIFICATION FOR MEDICARE PATIENTS - IN ORDER TO MEET MEDICARE REQUIREMENTS.
Follow-up with Dr. Kulkarni in his clinic in 2 days.  Start Augmentin.  Continue oxycodone for pain.  Ibuprofen 3 times daily as needed for breakthrough pain.  Return for fever, persistent vomiting or any other concerns.  
In order to meet Medicare requirements, the clinical documentation must support the information cited in the admission order.  Please be sure to provide detailed and clear documentation about the following in the admitting note/history and physical: